# Patient Record
Sex: MALE | Race: ASIAN | NOT HISPANIC OR LATINO | URBAN - METROPOLITAN AREA
[De-identification: names, ages, dates, MRNs, and addresses within clinical notes are randomized per-mention and may not be internally consistent; named-entity substitution may affect disease eponyms.]

---

## 2019-08-28 ENCOUNTER — INPATIENT (INPATIENT)
Facility: HOSPITAL | Age: 69
LOS: 8 days | Discharge: HOME HEALTH | End: 2019-09-06
Attending: HOSPITALIST | Admitting: HOSPITALIST
Payer: MEDICAID

## 2019-08-28 VITALS
SYSTOLIC BLOOD PRESSURE: 130 MMHG | DIASTOLIC BLOOD PRESSURE: 81 MMHG | RESPIRATION RATE: 16 BRPM | TEMPERATURE: 98 F | OXYGEN SATURATION: 99 % | HEART RATE: 87 BPM

## 2019-08-28 DIAGNOSIS — Z29.9 ENCOUNTER FOR PROPHYLACTIC MEASURES, UNSPECIFIED: ICD-10-CM

## 2019-08-28 DIAGNOSIS — R74.0 NONSPECIFIC ELEVATION OF LEVELS OF TRANSAMINASE AND LACTIC ACID DEHYDROGENASE [LDH]: ICD-10-CM

## 2019-08-28 DIAGNOSIS — G93.89 OTHER SPECIFIED DISORDERS OF BRAIN: ICD-10-CM

## 2019-08-28 DIAGNOSIS — R91.8 OTHER NONSPECIFIC ABNORMAL FINDING OF LUNG FIELD: ICD-10-CM

## 2019-08-28 LAB
ALBUMIN SERPL ELPH-MCNC: 4.1 G/DL — SIGNIFICANT CHANGE UP (ref 3.3–5)
ALBUMIN SERPL ELPH-MCNC: 4.3 G/DL — SIGNIFICANT CHANGE UP (ref 3.3–5)
ALP SERPL-CCNC: 126 U/L — HIGH (ref 40–120)
ALP SERPL-CCNC: 129 U/L — HIGH (ref 40–120)
ALT FLD-CCNC: 219 U/L — HIGH (ref 4–41)
ALT FLD-CCNC: 256 U/L — HIGH (ref 4–41)
ANION GAP SERPL CALC-SCNC: 13 MMO/L — SIGNIFICANT CHANGE UP (ref 7–14)
ANION GAP SERPL CALC-SCNC: 15 MMO/L — HIGH (ref 7–14)
APTT BLD: 26.1 SEC — LOW (ref 27.5–36.3)
AST SERPL-CCNC: 48 U/L — HIGH (ref 4–40)
AST SERPL-CCNC: 80 U/L — HIGH (ref 4–40)
BASOPHILS # BLD AUTO: 0.1 K/UL — SIGNIFICANT CHANGE UP (ref 0–0.2)
BASOPHILS # BLD AUTO: 0.16 K/UL — SIGNIFICANT CHANGE UP (ref 0–0.2)
BASOPHILS NFR BLD AUTO: 0.5 % — SIGNIFICANT CHANGE UP (ref 0–2)
BASOPHILS NFR BLD AUTO: 0.7 % — SIGNIFICANT CHANGE UP (ref 0–2)
BASOPHILS NFR SPEC: 0.8 % — SIGNIFICANT CHANGE UP (ref 0–2)
BILIRUB SERPL-MCNC: 0.2 MG/DL — SIGNIFICANT CHANGE UP (ref 0.2–1.2)
BILIRUB SERPL-MCNC: 0.3 MG/DL — SIGNIFICANT CHANGE UP (ref 0.2–1.2)
BLASTS # FLD: 0 % — SIGNIFICANT CHANGE UP (ref 0–0)
BLD GP AB SCN SERPL QL: NEGATIVE — SIGNIFICANT CHANGE UP
BUN SERPL-MCNC: 23 MG/DL — SIGNIFICANT CHANGE UP (ref 7–23)
BUN SERPL-MCNC: 26 MG/DL — HIGH (ref 7–23)
CALCIUM SERPL-MCNC: 9.2 MG/DL — SIGNIFICANT CHANGE UP (ref 8.4–10.5)
CALCIUM SERPL-MCNC: 9.2 MG/DL — SIGNIFICANT CHANGE UP (ref 8.4–10.5)
CHLORIDE SERPL-SCNC: 98 MMOL/L — SIGNIFICANT CHANGE UP (ref 98–107)
CHLORIDE SERPL-SCNC: 99 MMOL/L — SIGNIFICANT CHANGE UP (ref 98–107)
CO2 SERPL-SCNC: 21 MMOL/L — LOW (ref 22–31)
CO2 SERPL-SCNC: 23 MMOL/L — SIGNIFICANT CHANGE UP (ref 22–31)
CREAT SERPL-MCNC: 0.8 MG/DL — SIGNIFICANT CHANGE UP (ref 0.5–1.3)
CREAT SERPL-MCNC: 0.88 MG/DL — SIGNIFICANT CHANGE UP (ref 0.5–1.3)
EOSINOPHIL # BLD AUTO: 0.01 K/UL — SIGNIFICANT CHANGE UP (ref 0–0.5)
EOSINOPHIL # BLD AUTO: 0.02 K/UL — SIGNIFICANT CHANGE UP (ref 0–0.5)
EOSINOPHIL NFR BLD AUTO: 0 % — SIGNIFICANT CHANGE UP (ref 0–6)
EOSINOPHIL NFR BLD AUTO: 0.1 % — SIGNIFICANT CHANGE UP (ref 0–6)
EOSINOPHIL NFR FLD: 0 % — SIGNIFICANT CHANGE UP (ref 0–6)
GIANT PLATELETS BLD QL SMEAR: PRESENT — SIGNIFICANT CHANGE UP
GLUCOSE SERPL-MCNC: 114 MG/DL — HIGH (ref 70–99)
GLUCOSE SERPL-MCNC: 134 MG/DL — HIGH (ref 70–99)
HCT VFR BLD CALC: 39.4 % — SIGNIFICANT CHANGE UP (ref 39–50)
HCT VFR BLD CALC: 39.6 % — SIGNIFICANT CHANGE UP (ref 39–50)
HGB BLD-MCNC: 13 G/DL — SIGNIFICANT CHANGE UP (ref 13–17)
HGB BLD-MCNC: 13.3 G/DL — SIGNIFICANT CHANGE UP (ref 13–17)
IMM GRANULOCYTES NFR BLD AUTO: 6.1 % — HIGH (ref 0–1.5)
IMM GRANULOCYTES NFR BLD AUTO: 6.5 % — HIGH (ref 0–1.5)
INR BLD: 0.91 — SIGNIFICANT CHANGE UP (ref 0.88–1.17)
LYMPHOCYTES # BLD AUTO: 1.7 K/UL — SIGNIFICANT CHANGE UP (ref 1–3.3)
LYMPHOCYTES # BLD AUTO: 1.87 K/UL — SIGNIFICANT CHANGE UP (ref 1–3.3)
LYMPHOCYTES # BLD AUTO: 7.7 % — LOW (ref 13–44)
LYMPHOCYTES # BLD AUTO: 9.5 % — LOW (ref 13–44)
LYMPHOCYTES NFR SPEC AUTO: 6.8 % — LOW (ref 13–44)
MAGNESIUM SERPL-MCNC: 2.2 MG/DL — SIGNIFICANT CHANGE UP (ref 1.6–2.6)
MCHC RBC-ENTMCNC: 29 PG — SIGNIFICANT CHANGE UP (ref 27–34)
MCHC RBC-ENTMCNC: 29.1 PG — SIGNIFICANT CHANGE UP (ref 27–34)
MCHC RBC-ENTMCNC: 33 % — SIGNIFICANT CHANGE UP (ref 32–36)
MCHC RBC-ENTMCNC: 33.6 % — SIGNIFICANT CHANGE UP (ref 32–36)
MCV RBC AUTO: 86.7 FL — SIGNIFICANT CHANGE UP (ref 80–100)
MCV RBC AUTO: 87.9 FL — SIGNIFICANT CHANGE UP (ref 80–100)
METAMYELOCYTES # FLD: 0.9 % — SIGNIFICANT CHANGE UP (ref 0–1)
MONOCYTES # BLD AUTO: 1.53 K/UL — HIGH (ref 0–0.9)
MONOCYTES # BLD AUTO: 1.55 K/UL — HIGH (ref 0–0.9)
MONOCYTES NFR BLD AUTO: 7.1 % — SIGNIFICANT CHANGE UP (ref 2–14)
MONOCYTES NFR BLD AUTO: 7.8 % — SIGNIFICANT CHANGE UP (ref 2–14)
MONOCYTES NFR BLD: 7.7 % — SIGNIFICANT CHANGE UP (ref 2–9)
MYELOCYTES NFR BLD: 0.9 % — HIGH (ref 0–0)
NEUTROPHIL AB SER-ACNC: 81.2 % — HIGH (ref 43–77)
NEUTROPHILS # BLD AUTO: 14.87 K/UL — HIGH (ref 1.8–7.4)
NEUTROPHILS # BLD AUTO: 17.12 K/UL — HIGH (ref 1.8–7.4)
NEUTROPHILS NFR BLD AUTO: 76 % — SIGNIFICANT CHANGE UP (ref 43–77)
NEUTROPHILS NFR BLD AUTO: 78 % — HIGH (ref 43–77)
NEUTS BAND # BLD: 0.8 % — SIGNIFICANT CHANGE UP (ref 0–6)
NRBC # FLD: 0 K/UL — SIGNIFICANT CHANGE UP (ref 0–0)
NRBC # FLD: 0 K/UL — SIGNIFICANT CHANGE UP (ref 0–0)
OTHER - HEMATOLOGY %: 0 — SIGNIFICANT CHANGE UP
PHOSPHATE SERPL-MCNC: 3.4 MG/DL — SIGNIFICANT CHANGE UP (ref 2.5–4.5)
PLATELET # BLD AUTO: 287 K/UL — SIGNIFICANT CHANGE UP (ref 150–400)
PLATELET # BLD AUTO: 311 K/UL — SIGNIFICANT CHANGE UP (ref 150–400)
PLATELET COUNT - ESTIMATE: NORMAL — SIGNIFICANT CHANGE UP
PMV BLD: 10.6 FL — SIGNIFICANT CHANGE UP (ref 7–13)
PMV BLD: 10.8 FL — SIGNIFICANT CHANGE UP (ref 7–13)
POTASSIUM SERPL-MCNC: 4 MMOL/L — SIGNIFICANT CHANGE UP (ref 3.5–5.3)
POTASSIUM SERPL-MCNC: 4.6 MMOL/L — SIGNIFICANT CHANGE UP (ref 3.5–5.3)
POTASSIUM SERPL-SCNC: 4 MMOL/L — SIGNIFICANT CHANGE UP (ref 3.5–5.3)
POTASSIUM SERPL-SCNC: 4.6 MMOL/L — SIGNIFICANT CHANGE UP (ref 3.5–5.3)
PROMYELOCYTES # FLD: 0 % — SIGNIFICANT CHANGE UP (ref 0–0)
PROT SERPL-MCNC: 6.7 G/DL — SIGNIFICANT CHANGE UP (ref 6–8.3)
PROT SERPL-MCNC: 7 G/DL — SIGNIFICANT CHANGE UP (ref 6–8.3)
PROTHROM AB SERPL-ACNC: 10.4 SEC — SIGNIFICANT CHANGE UP (ref 9.8–13.1)
RBC # BLD: 4.48 M/UL — SIGNIFICANT CHANGE UP (ref 4.2–5.8)
RBC # BLD: 4.57 M/UL — SIGNIFICANT CHANGE UP (ref 4.2–5.8)
RBC # FLD: 13.4 % — SIGNIFICANT CHANGE UP (ref 10.3–14.5)
RBC # FLD: 13.4 % — SIGNIFICANT CHANGE UP (ref 10.3–14.5)
RH IG SCN BLD-IMP: POSITIVE — SIGNIFICANT CHANGE UP
SODIUM SERPL-SCNC: 134 MMOL/L — LOW (ref 135–145)
SODIUM SERPL-SCNC: 135 MMOL/L — SIGNIFICANT CHANGE UP (ref 135–145)
VARIANT LYMPHS # BLD: 0.9 % — SIGNIFICANT CHANGE UP
WBC # BLD: 19.59 K/UL — HIGH (ref 3.8–10.5)
WBC # BLD: 21.97 K/UL — HIGH (ref 3.8–10.5)
WBC # FLD AUTO: 19.59 K/UL — HIGH (ref 3.8–10.5)
WBC # FLD AUTO: 21.97 K/UL — HIGH (ref 3.8–10.5)

## 2019-08-28 PROCEDURE — 99223 1ST HOSP IP/OBS HIGH 75: CPT

## 2019-08-28 PROCEDURE — 70450 CT HEAD/BRAIN W/O DYE: CPT | Mod: 26

## 2019-08-28 PROCEDURE — 99233 SBSQ HOSP IP/OBS HIGH 50: CPT | Mod: GC

## 2019-08-28 PROCEDURE — 71260 CT THORAX DX C+: CPT | Mod: 26

## 2019-08-28 PROCEDURE — 70553 MRI BRAIN STEM W/O & W/DYE: CPT | Mod: 26

## 2019-08-28 PROCEDURE — 74177 CT ABD & PELVIS W/CONTRAST: CPT | Mod: 26

## 2019-08-28 PROCEDURE — 99223 1ST HOSP IP/OBS HIGH 75: CPT | Mod: GC

## 2019-08-28 RX ORDER — IBUPROFEN 200 MG
600 TABLET ORAL THREE TIMES A DAY
Refills: 0 | Status: DISCONTINUED | OUTPATIENT
Start: 2019-08-28 | End: 2019-09-03

## 2019-08-28 RX ORDER — ACETAMINOPHEN 500 MG
650 TABLET ORAL EVERY 4 HOURS
Refills: 0 | Status: DISCONTINUED | OUTPATIENT
Start: 2019-08-28 | End: 2019-08-28

## 2019-08-28 RX ORDER — IBUPROFEN 200 MG
200 TABLET ORAL EVERY 6 HOURS
Refills: 0 | Status: DISCONTINUED | OUTPATIENT
Start: 2019-08-28 | End: 2019-09-03

## 2019-08-28 RX ORDER — LEVETIRACETAM 250 MG/1
500 TABLET, FILM COATED ORAL
Refills: 0 | Status: DISCONTINUED | OUTPATIENT
Start: 2019-08-28 | End: 2019-09-01

## 2019-08-28 RX ORDER — ONDANSETRON 8 MG/1
4 TABLET, FILM COATED ORAL EVERY 6 HOURS
Refills: 0 | Status: DISCONTINUED | OUTPATIENT
Start: 2019-08-28 | End: 2019-09-06

## 2019-08-28 RX ORDER — DEXAMETHASONE 0.5 MG/5ML
1.5 ELIXIR ORAL THREE TIMES A DAY
Refills: 0 | Status: DISCONTINUED | OUTPATIENT
Start: 2019-08-28 | End: 2019-09-01

## 2019-08-28 RX ORDER — DOCUSATE SODIUM 100 MG
100 CAPSULE ORAL THREE TIMES A DAY
Refills: 0 | Status: DISCONTINUED | OUTPATIENT
Start: 2019-08-28 | End: 2019-09-06

## 2019-08-28 RX ORDER — ACETAMINOPHEN 500 MG
1000 TABLET ORAL EVERY 6 HOURS
Refills: 0 | Status: DISCONTINUED | OUTPATIENT
Start: 2019-08-28 | End: 2019-08-28

## 2019-08-28 RX ADMIN — Medication 1.5 MILLIGRAM(S): at 22:43

## 2019-08-28 RX ADMIN — LEVETIRACETAM 500 MILLIGRAM(S): 250 TABLET, FILM COATED ORAL at 22:43

## 2019-08-28 RX ADMIN — Medication 600 MILLIGRAM(S): at 23:40

## 2019-08-28 RX ADMIN — Medication 600 MILLIGRAM(S): at 22:45

## 2019-08-28 NOTE — H&P ADULT - ASSESSMENT
69 y/o M with no known PMH presenting with a 1 month hx of vertigo and ataxia s/p fall a/f possible metastatic disease demonstrated on CT head non-con 67 y/o M with no known PMH presenting with a 1 month hx of vertigo and ataxia s/p witnessed fall a/f possible metastatic disease demonstrated on CT head non-con 69 y/o M with no known PMH presenting with a 1 month hx of vertigo and ataxia s/p witnessed fall a/f presumed metastatic disease of brain and lungs.

## 2019-08-28 NOTE — H&P ADULT - NSHPSOCIALHISTORY_GEN_ALL_CORE
retired  52 pack year tobacco smoking history, quit 20 years ago  denied alcohol use  denied illicit drugs retired, travels often between [facility] and the   52 pack year tobacco smoking history, quit 20 years ago  denied alcohol use  denied illicit drugs

## 2019-08-28 NOTE — H&P ADULT - NSICDXPASTMEDICALHX_GEN_ALL_CORE_FT
PAST MEDICAL HISTORY:  No pertinent past medical history patient does not formally see doctors, just speaks to his friends

## 2019-08-28 NOTE — ED PROVIDER NOTE - OBJECTIVE STATEMENT
68yom with no known medical history has been having vertigo and unsteady gait for the past 45 days. Gradual onset of symptoms with no precipitating event, constant since onset. Vertigo is exacerbated by head movements. Seen initially in [facility] and was diagnosed with brain masses. [place].

## 2019-08-28 NOTE — ED PROVIDER NOTE - CLINICAL SUMMARY MEDICAL DECISION MAKING FREE TEXT BOX
known brain mets, ataxic and vertiginous but otherwise neuro intact. pan scan revealed known brain lesions as well as lodules in the lung. Evaluated by neurosurgery, recommend MRI and metastatic work up, at this time no role for steroids or surgical intervention. Noted leukocytosis, pt is afebrile with no infectious symptoms, no role for antibiotics at this time.

## 2019-08-28 NOTE — CONSULT NOTE ADULT - SUBJECTIVE AND OBJECTIVE BOX
NEUROSURGERY CONSULT  MD CARABALLO   08-28-19 @ 13:57    HPI: 68y Male with no significant PMHx here from [facility] with 45 days of vertigo and unsteady gait. In [facility], was found to have multiple brain mets. Patient does not endorse pain, nausea, vomiting, or headache. No changes in bowel or bladder. Unsteady gait.    RADIOLOGY:   < from: CT Head No Cont (08.28.19 @ 12:01) >    Multiple  lesions concerning for metastasis with hemorrhagic and/or   proteinaceous debris as detailed above, including left parietal occipital   mass with fluid fluid level with  edema and mass effect, MRI with   gadolinium will better assess. Basal cisterns are patent, no extra axial   collection or midline shift. Strongly suggest improved assessment using   MRI with gadolinium.    < from: CT Abdomen and Pelvis w/ IV Cont (08.28.19 @ 12:01) >    Emphysema with bilateral solid pulmonary nodules measuring 2.3 cm in the   right upper lobe and 0.7 cm in the left upper lobe. Additional   groundglass and mixed solid and ground glass nodular opacities   bilaterally. Pulmonary nodules are indeterminate, but may be seen in the   setting of a primary lung malignancy. No intrathoracic lymphadenopathy.    No solid organ lesion or lymphadenopathy in the abdomen or pelvis.    PHYSICAL EXAM:  Vital Signs Last 24 Hrs  T(C): 36.4 (28 Aug 2019 13:00), Max: 36.5 (28 Aug 2019 09:07)  T(F): 97.6 (28 Aug 2019 13:00), Max: 97.7 (28 Aug 2019 09:07)  HR: 80 (28 Aug 2019 13:00) (80 - 87)  BP: 127/63 (28 Aug 2019 13:00) (127/63 - 130/81)  RR: 16 (28 Aug 2019 13:00) (16 - 16)  SpO2: 98% (28 Aug 2019 13:00) (98% - 99%)    AAOx3  EOMI, PERRL  RUIZ x4 with good strength   sensation intact  no drift     LABS:                        13.3   21.97 )-----------( 311      ( 28 Aug 2019 10:20 )             39.6     08-28    135  |  99  |  26<H>  ----------------------------<  134<H>  4.0   |  21<L>  |  0.88    Ca    9.2      28 Aug 2019 10:20  TPro  7.0  /  Alb  4.3  /  TBili  0.3  /  DBili  x   /  AST  80<H>  /  ALT  256<H>  /  AlkPhos  129<H>  08-28

## 2019-08-28 NOTE — H&P ADULT - NSHPLABSRESULTS_GEN_ALL_CORE
CBC Full  -  ( 28 Aug 2019 10:20 )  WBC Count : 21.97 K/uL  RBC Count : 4.57 M/uL  Hemoglobin : 13.3 g/dL  Hematocrit : 39.6 %  Platelet Count - Automated : 311 K/uL  Mean Cell Volume : 86.7 fL  Mean Cell Hemoglobin : 29.1 pg  Mean Cell Hemoglobin Concentration : 33.6 %  Auto Neutrophil # : 17.12 K/uL  Auto Lymphocyte # : 1.70 K/uL  Auto Monocyte # : 1.55 K/uL  Auto Eosinophil # : 0.01 K/uL  Auto Basophil # : 0.16 K/uL  Auto Neutrophil % : 78.0 %  Auto Lymphocyte % : 7.7 %  Auto Monocyte % : 7.1 %  Auto Eosinophil % : 0.0 %  Auto Basophil % : 0.7 %      08-28    135  |  99  |  26<H>  ----------------------------<  134<H>  4.0   |  21<L>  |  0.88    Ca    9.2      28 Aug 2019 10:20    TPro  7.0  /  Alb  4.3  /  TBili  0.3  /  DBili  x   /  AST  80<H>  /  ALT  256<H>  /  AlkPhos  129<H>  08-28    < from: CT Head No Cont (08.28.19 @ 12:01) >    PROCEDURE DATE:  Aug 28 2019         INTERPRETATION:  CLINICAL INDICATION: Brain metastases from outside   imaging,  evaluate malignancy, blurred vision    Technique: Noncontrast CT of the head was performed.    Multiple contiguous axial images were acquired from the skull base to the   vertex without the administration of intravenous contrast. Coronal and   sagittal reformations were made.    COMPARISON: None.    FINDINGS:    There are multiple supra and infratentorial lesions several of which   demonstrate increased rim attenuation, with central decreased attenuation   concerning for metastasis including and not limited to: a high midline   cerebellar vermis lesion measuring 1.8 x 2.8 x 1.8 cm, AP, TR, CC, and   inferior heterogeneous mass effacing the fourth ventricle measuring 2.5 x   2.5 x 2.1 cm, AP, TR, CC, multiple additional lesions noted in the   cerebral hemispheres hemisphere, including one with increased attenuation   in the right frontal lobe measuring 1.3 x 1.0 x 0.92 cm, AP, TR, CC and   left parietal occipital lesion with increased attenuation and fluid fluid   level measuring 1.4 x 1.5 x 1.8 cm, AP, TR, CC, edema and mass effect   with effacement of the sulci. Multiple additional smaller lesions are   noted. MR with gadolinium is more sensitive for full assessment of   intracranial metastatic disease.    There is mild enlargement the ventricles and sulci consistent with volume   loss, there is enlargement and decreased attenuation along the forniceal   columns, (2:14). There is nodular white matter decreased attenuation,   likely microvascular disease, other etiologies not excluded. The   cavernous sinus vasculature is prominent greater than (400:24). There is   no large extra-axial collection, or significant midline shift.    Orbits demonstrate a rightward gaze preference. Paranasal sinuses and   mastoid air cells appear free of disease. The calvarium is intact.    IMPRESSION:    Multiple  lesions concerning for metastasis with hemorrhagic and/or   proteinaceous debris as detailed above, including left parietal occipital   mass with fluid fluid level with  edema and mass effect, MRI with   gadolinium will better assess. Basal cisterns are patent, no extra axial   collection or midline shift. Strongly suggest improved assessment using   MRI with gadolinium.    < end of copied text >    CT Abdomen/Pelvis:  LIVER: Normal morphology. A subcentimeter hypodensity in the right lobe   is too small to characterize, but indeterminate in the setting of   possible malignancy.    IMPRESSION:   Emphysema with bilateral solid pulmonary nodules measuring 2.3 cm in the right upper lobe and 0.7 cm in the left upper lobe. Additional groundglass and mixed solid and ground glass nodular opacities bilaterally. Pulmonary nodules are indeterminate, but may be seen in the setting of a primary lung malignancy. No intrathoracic lymphadenopathy.    No solid organ lesion or lymphadenopathy in the abdomen or pelvis. CBC Full  -  ( 28 Aug 2019 10:20 )  WBC Count : 21.97 K/uL  RBC Count : 4.57 M/uL  Hemoglobin : 13.3 g/dL  Hematocrit : 39.6 %  Platelet Count - Automated : 311 K/uL  Mean Cell Volume : 86.7 fL  Mean Cell Hemoglobin : 29.1 pg  Mean Cell Hemoglobin Concentration : 33.6 %  Auto Neutrophil # : 17.12 K/uL  Auto Lymphocyte # : 1.70 K/uL  Auto Monocyte # : 1.55 K/uL  Auto Eosinophil # : 0.01 K/uL  Auto Basophil # : 0.16 K/uL  Auto Neutrophil % : 78.0 %  Auto Lymphocyte % : 7.7 %  Auto Monocyte % : 7.1 %  Auto Eosinophil % : 0.0 %  Auto Basophil % : 0.7 %    08-28    135  |  99  |  26<H>  ----------------------------<  134<H>  4.0   |  21<L>  |  0.88    Ca    9.2      28 Aug 2019 10:20    TPro  7.0  /  Alb  4.3  /  TBili  0.3  /  DBili  x   /  AST  80<H>  /  ALT  256<H>  /  AlkPhos  129<H>  08-28    < from: CT Head No Cont (08.28.19 @ 12:01) >    PROCEDURE DATE:  Aug 28 2019         INTERPRETATION:  CLINICAL INDICATION: Brain metastases from outside   imaging,  evaluate malignancy, blurred vision    Technique: Noncontrast CT of the head was performed.    Multiple contiguous axial images were acquired from the skull base to the   vertex without the administration of intravenous contrast. Coronal and   sagittal reformations were made.    COMPARISON: None.    FINDINGS:    There are multiple supra and infratentorial lesions several of which   demonstrate increased rim attenuation, with central decreased attenuation   concerning for metastasis including and not limited to: a high midline   cerebellar vermis lesion measuring 1.8 x 2.8 x 1.8 cm, AP, TR, CC, and   inferior heterogeneous mass effacing the fourth ventricle measuring 2.5 x   2.5 x 2.1 cm, AP, TR, CC, multiple additional lesions noted in the   cerebral hemispheres hemisphere, including one with increased attenuation   in the right frontal lobe measuring 1.3 x 1.0 x 0.92 cm, AP, TR, CC and   left parietal occipital lesion with increased attenuation and fluid fluid   level measuring 1.4 x 1.5 x 1.8 cm, AP, TR, CC, edema and mass effect   with effacement of the sulci. Multiple additional smaller lesions are   noted. MR with gadolinium is more sensitive for full assessment of   intracranial metastatic disease.    There is mild enlargement the ventricles and sulci consistent with volume   loss, there is enlargement and decreased attenuation along the forniceal   columns, (2:14). There is nodular white matter decreased attenuation,   likely microvascular disease, other etiologies not excluded. The   cavernous sinus vasculature is prominent greater than (400:24). There is   no large extra-axial collection, or significant midline shift.    Orbits demonstrate a rightward gaze preference. Paranasal sinuses and   mastoid air cells appear free of disease. The calvarium is intact.    IMPRESSION:    Multiple  lesions concerning for metastasis with hemorrhagic and/or   proteinaceous debris as detailed above, including left parietal occipital   mass with fluid fluid level with  edema and mass effect, MRI with   gadolinium will better assess. Basal cisterns are patent, no extra axial   collection or midline shift. Strongly suggest improved assessment using   MRI with gadolinium.    < end of copied text >    CT Abdomen/Pelvis:  LIVER: Normal morphology. A subcentimeter hypodensity in the right lobe   is too small to characterize, but indeterminate in the setting of   possible malignancy.    IMPRESSION:   Emphysema with bilateral solid pulmonary nodules measuring 2.3 cm in the right upper lobe and 0.7 cm in the left upper lobe. Additional groundglass and mixed solid and ground glass nodular opacities bilaterally. Pulmonary nodules are indeterminate, but may be seen in the setting of a primary lung malignancy. No intrathoracic lymphadenopathy.    No solid organ lesion or lymphadenopathy in the abdomen or pelvis.

## 2019-08-28 NOTE — H&P ADULT - NSHPPHYSICALEXAM_GEN_ALL_CORE
GENERAL: NAD, lying in bed comfortably  HEAD:  Atraumatic, Normocephalic  EYES: EOMI, PERRLA, conjunctiva and sclera clear  ENT: Moist mucous membranes  NECK: Supple, No JVD  CHEST/LUNG: Clear to auscultation bilaterally; No rales, rhonchi, wheezing, or rubs. Unlabored respirations  HEART: Regular rate and rhythm; No murmurs, rubs, or gallops  ABDOMEN: Bowel sounds present; Soft, Nontender, Nondistended. No hepatomegally  EXTREMITIES:  2+ Peripheral Pulses, brisk capillary refill. No clubbing, cyanosis, or edema  MSK: FROM all 4 extremities, full and equal strength  SKIN: No rashes or lesions    NEURO:   Cranial Nerves 2-12 intact GENERAL: NAD, lying in bed comfortably  HEAD:  Atraumatic, Normocephalic  EYES: EOMI, PERRLA, conjunctiva and sclera clear  ENT: Moist mucous membranes  NECK: Supple, No JVD  CHEST/LUNG: Clear to auscultation bilaterally; No rales, rhonchi, wheezing, or rubs. Unlabored respirations  HEART: RRR, S1, S2, no M/R/G  ABDOMEN: Bowel sounds present; Soft, Nontender, Nondistended. No hepatomegally  EXTREMITIES:  2+ Peripheral Pulses, brisk capillary refill. No clubbing, cyanosis, or edema  MSK: FROM all 4 extremities, full and equal strength  SKIN: No rashes or lesions    NEURO:   Cranial Nerves 2-12 intact  Deltoid, bicep, tricep, wrist extensor, wrist flexor, intrinsic hand muscles strength bilat 5/5  Leg flexor, leg extensor, gastrocnemius, and tibialis strength 5/5 bilat  Slight resting tremor right hand  Sensation intact bilat  No pronator drift

## 2019-08-28 NOTE — H&P ADULT - PROBLEM SELECTOR PLAN 4
diet: regular; NPO after midnight for possible bronch tomorrow  VTE: SCDs in the setting of possible hemorrhage from brain lesion    Discussed with Dr. Mccrary

## 2019-08-28 NOTE — ED PROVIDER NOTE - ATTENDING CONTRIBUTION TO CARE
fior: hx from pt and cell phone pictures of mri reports from [facility].  PMh unremarkable.  45 day hx of 'vertigo' and unsteady gait. Hospitalized in [facility] for same and found to have multiple brain lesions c/w mets. [place] elected to [place] and pt arrived yesterday and came to [facility] today. There is a 3kg wt loss one month. No other new sx.   Exam: remarkable for slow and slightly unsteady gait.  Impression: Brain mets by [place]. fior: hx from pt and cell phone pictures of mri reports from [facility].  PMh unremarkable.  45 day hx of 'vertigo' and unsteady gait. Hospitalized in [facility] for same and found to have multiple brain lesions c/w mets. [place] elected to [place] and pt arrived yesterday and came to [facility] today. There is a 3kg wt loss one month. No other new sx.   Exam: remarkable for slow and slightly unsteady gait.  Impression: Brain mets by [place] MR report..

## 2019-08-28 NOTE — H&P ADULT - PROBLEM SELECTOR PLAN 3
possible metastatic dx in the setting of findings above in subcentimeter hypodense region seen on CT abdomen  - Abdominal U/S

## 2019-08-28 NOTE — H&P ADULT - HISTORY OF PRESENT ILLNESS
The patient is a 68y M with no known medical history and a 52 pack-year smoking hx quit 20 years ago presenting with 1-month history of acutely worsening ataxia.  He was seen in [facility] and was found to have masses in the brain and [place] the [place]. He reports having mild ataxia for approximately 1 year, before it acutely worsened approximately 1 month ago. He had a witnessed episode of syncope, without associated shaking or post-ictal state. Since then, he has severe ataxia whenever he stands up, requiring him to use a cane to walk. The ataxia is somewhat improved after sleeping, otherwise nothing has helped alleviate the symptoms, including Betahistine Dihydrochloride (for vertigo and Meniere’s disease) given in [facility]. He reports the severity of his ataxia has been constant for the past month.   About 2 weeks ago, the patient experienced a sharp, 10/10 headache with 1 episode of non-bloody vomiting. He described the headache as the worst of his life, with associated sensitivity to bright lights and loud sounds. He has had intermitted episodes of headache since then that have not been as severe. He has not vomited since then.  The patient also describes new blurriness in his peripheral vision bilaterally and 1 week of mild tremor. He reports subjective weight loss over the past month, estimated to be ~3kg, as noticed by a decreased arm circumference. No fevers or chills. No shortness of breath, chest pain, cough, hemoptysis, or fatigue. Apart from the single episode of vomiting with his headache, he has had no nausea, vomiting, diarrhea, or constipation. No hearing loss or difficulty chewing. There is some mild dysphagia that has been present for years The patient is a 68y M with no known medical history and a 52 pack-year smoking hx quit 20 years ago presenting with 1-month history of acutely worsening ataxia.  He was seen by a doctor in [facility], was found to have masses in the brain and [place] the [place]. He reports having mild ataxia for approximately 1 year, before it acutely worsened approximately 1 month ago. He had a witnessed episode of syncope, without associated shaking or post-ictal state. Since then, he has severe ataxia whenever he stands up, requiring him to use a cane to walk. The ataxia is somewhat improved after sleeping, otherwise nothing has helped alleviate the symptoms, including Betahistine Dihydrochloride (for vertigo and Meniere’s disease) given in [facility]. He reports the severity of his ataxia has been constant for the past month.   About 2 weeks ago, the patient experienced a sharp, 10/10 headache with 1 episode of non-bloody vomiting. He described the headache as the worst of his life, with associated sensitivity to bright lights and loud sounds. He has had intermitted episodes of headache since then that have not been as severe. He has not vomited since then.  The patient also describes new blurriness in his peripheral vision bilaterally and 1 week of mild tremor. He reports subjective weight loss over the past month, estimated to be ~3kg, as noticed by a decreased arm circumference. No fevers or chills. No shortness of breath, chest pain, cough, hemoptysis, or fatigue. Apart from the single episode of vomiting with his headache, he has had no nausea, vomiting, diarrhea, or constipation. No hearing loss or difficulty chewing. There is some mild dysphagia that has been present for years The patient is a 68y M with no reported medical history and a 52 pack-year smoking hx quit 20 years ago presenting with 1-month history of acutely worsening ataxia.  He was seen by a doctor in [facility], was found to have masses in the brain by imaging and [place] the [place]. He reports having mild ataxia for approximately 1 year, before it acutely worsened approximately 1 month ago. He had a witnessed episode of syncope, without associated shaking or post-ictal state. Since then, he has had severe ataxia whenever he stands up, requiring him to use a cane to walk. The ataxia is somewhat improved after sleeping, otherwise nothing has helped alleviate the symptoms, including Betahistine Dihydrochloride (for vertigo and Meniere’s disease) given in [facility]. He reports the severity of his ataxia has been constant for the past month.   About 2 weeks ago, the patient experienced a sharp, 10/10 headache with 1 episode of non-bloody vomiting. He described the headache as the worst of his life, with associated sensitivity to bright lights and loud sounds. He has had intermitted episodes of headache since then that have not been as severe. He has not vomited since then.  The patient also describes new blurriness in his peripheral vision bilaterally and 1 week of mild tremor. He reports subjective weight loss over the past month, estimated to be ~3kg, as noticed by a decreased arm circumference. No fevers or chills. No shortness of breath, chest pain, cough, hemoptysis, or fatigue. Apart from the single episode of vomiting with his headache, he has had no nausea, vomiting, diarrhea, or constipation. No hearing loss or difficulty chewing. There is some mild dysphagia that has been present for years.  In the ED, pt had T 97.7, HT 87, /81, RR 16, SpO2 99%RA.  He had leukocytosis to 19.59 (s/p steroids in [facility]) without bandemia, and transaminitis w/ AST 48, , and alk phos of 126.

## 2019-08-28 NOTE — H&P ADULT - ATTENDING COMMENTS
Patient personally seen and examined with primary team on 8/28/19 at 4:15 pm in the [facility] ED.  Family at bedside.  Plan as outlined.  Pulm and NeuroSx appreciated.  Will need tissue bx.

## 2019-08-28 NOTE — ED ADULT NURSE NOTE - NSIMPLEMENTINTERV_GEN_ALL_ED
Implemented All Fall Risk Interventions:  [place] to call system. Call bell, personal items and telephone within reach. Instruct patient to call for assistance. Room bathroom lighting operational. Non-slip footwear when patient is off stretcher. Physically safe environment: no spills, clutter or unnecessary equipment. Stretcher in lowest position, wheels locked, appropriate side rails in place. Provide visual cue, wrist band, yellow gown, etc. Monitor gait and stability. Monitor for mental status changes and reorient to person, place, and time. Review medications for side effects contributing to fall risk. Reinforce activity limits and safety measures with patient and family.

## 2019-08-28 NOTE — ED ADULT NURSE NOTE - CHIEF COMPLAINT QUOTE
Pt c/o dizziness and near syncope  x 1 mth , unsteady gait, neck stiffness, nausea, intermittent blurry vision, sometimes loss of vision, headache .  Pt was seen in the hospital in [facility] after fall with the same symptoms and was prescribed a pill

## 2019-08-28 NOTE — ED ADULT NURSE NOTE - OBJECTIVE STATEMENT
68M presents with dizziness, room spinning, nausea, near syncope x 1 month, recently placed on vertigo medications in [facility] with no relief. Pt states the medications have not helped. Dizziness exacerbated by moving the head side to side. Pt denies chest pain, SOB, fever, chills.

## 2019-08-28 NOTE — ED ADULT TRIAGE NOTE - CHIEF COMPLAINT QUOTE
Pt c/o dizziness and near syncope  x 1 mth , unsteady gait, neck stiffness, nausea, intermittent blurry vision, sometimes loss of vision, headache .  Pt was seen in the hospital in [facility] after fall with the same symptoms and was prescribed a pill

## 2019-08-28 NOTE — H&P ADULT - PROBLEM SELECTOR PLAN 2
primary lung CA in pt with 50 pack year smoking hx vs. metastatic dx from unknown primary  - pulmonology c/s; plan for bronchoscopy w/ bx tomorrow  - NPO after midnight

## 2019-08-28 NOTE — H&P ADULT - NSHPREVIEWOFSYSTEMS_GEN_ALL_CORE
REVIEW OF SYSTEMS:  CONSTITUTIONAL: No fever, chills, night sweats, or fatigue  EYES: diminished peripheral vision; sensitivity to light; no pain or blurry vision  ENMT:  vertigo, sensitivity to sound; no tinnitus; no sinus or throat pain  NECK: No pain or stiffness  RESPIRATORY: No cough, wheezing, or hemoptysis; No shortness of breath  CARDIOVASCULAR: No chest pain, palpitations, dizziness, or leg swelling  GASTROINTESTINAL: One episode of NBNB vomiting with headache; No abdominal or epigastric pain. No hematemesis; No diarrhea or constipation. No melena or hematochezia.  GENITOURINARY: No dysuria, frequency, hematuria, or incontinence  NEUROLOGICAL: Sharp headaches; no memory loss, loss of strength, numbness, or tremors  SKIN: No itching, burning, rashes, or lesions   LYMPH NODES: No enlarged glands  ENDOCRINE: No heat or cold intolerance; No hair loss  MUSCULOSKELETAL: No joint pain or swelling; No muscle, back, or extremity pain  HEME/LYMPH: No easy bruising, or bleeding gums  ALLERGY AND IMMUNOLOGIC: No hives or eczema

## 2019-08-28 NOTE — H&P ADULT - PROBLEM SELECTOR PLAN 1
multiple lesions seen on CTH noncon concerning for metastasis disease from unknown primary vs. less likely primary neoplasm    disease vertigo, ataxia, and vision change likely 2/2 to multiple lesions seen on CTH noncon concerning for metastasis disease from unknown primary vs. less likely primary neoplasm  - disease vertigo, ataxia, and vision change likely 2/2 to brain lesion  - neurosurgery c/s, recs reviewed and appreciated  - f/u MR Brain  - c/w previous dose of dexamethasone 1.5 mg TID   - c/w levetiracetam 500 mg BID  - fall risk precautions multiple lesions seen on CTH noncon concerning for metastasis disease from unknown primary vs. less likely primary neoplasm  - disease vertigo, ataxia, and vision change likely 2/2 to brain lesion  - neurosurgery c/s, recs reviewed and appreciated  - f/u MR Brain  - c/w previous dose of dexamethasone 1.5 mg TID; leukocytosis w/ bands likely 2/2 to steroid use  - c/w levetiracetam 500 mg BID  - fall risk precautions

## 2019-08-28 NOTE — CONSULT NOTE ADULT - SUBJECTIVE AND OBJECTIVE BOX
CHIEF COMPLAINT:    HPI: Patient is a 67 yo M from [facility] with unclear PMH who presents with unsteady gait and vertigo symptoms for about 1.5 months. As per patient, he had head imaging in [facility] that "was confusing" so he came here for further work up. Patient unable to recount what medication he is on. In the ED, CT head showed multiple lesions concerning for metastasis with hemorrhagic and/or proteinaceous debris, including left parietal occipital mass with fluid level with edema and mass effect. CT C/A/P showed emphysema with bilateral solid pulmonary nodules measuring 2.3 cm in RUL and 0.7 cm REINALDO as well as additional groundglass and mixed solid and ground glass nodular opacities bilaterally; no intrathoracic lymphadenopathy and no solid organ lesion or lymphadenopathy in the abdomen or pelvis.      PAST MEDICAL & SURGICAL HISTORY:  No pertinent past medical history  No significant past surgical history      FAMILY HISTORY:      SOCIAL HISTORY:  Smoking: [ ] Never Smoked [ ] Former Smoker (__ packs x ___ years) [ ] Current Smoker  (1.5 packs x 35 years)  Substance Use: [X] Never Used [ ] Used ____  EtOH Use: None  Marital Status: [ ] Single [ ]  [ ]  [ ]   Sexual History:   Occupation: Former   Recent Travel:  Country of Birth:  Advance Directives:    Allergies    No Known Allergies    Intolerances        HOME MEDICATIONS:  Home Medications:      REVIEW OF SYSTEMS:  Constitutional: [ ] negative [ ] fevers [ ] chills [ ] weight loss [ ] weight gain  HEENT: [ ] negative [ ] dry eyes [ ] eye irritation [ ] postnasal drip [ ] nasal congestion  CV: [ ] negative  [ ] chest pain [ ] orthopnea [ ] palpitations [ ] murmur  Resp: [ ] negative [ ] cough [ ] shortness of breath [ ] dyspnea [ ] wheezing [ ] sputum [ ] hemoptysis  GI: [ ] negative [ ] nausea [ ] vomiting [ ] diarrhea [ ] constipation [ ] abd pain [ ] dysphagia   : [ ] negative [ ] dysuria [ ] nocturia [ ] hematuria [ ] increased urinary frequency  Musculoskeletal: [ ] negative [ ] back pain [ ] myalgias [ ] arthralgias [ ] fracture  Skin: [ ] negative [ ] rash [ ] itch  Neurological: [ ] negative [ ] headache [ ] dizziness [ ] syncope [ ] weakness [ ] numbness  Psychiatric: [ ] negative [ ] anxiety [ ] depression  Endocrine: [ ] negative [ ] diabetes [ ] thyroid problem  Hematologic/Lymphatic: [ ] negative [ ] anemia [ ] bleeding problem  Allergic/Immunologic: [ ] negative [ ] itchy eyes [ ] nasal discharge [ ] hives [ ] angioedema  [ ] All other systems negative  [ ] Unable to assess ROS because ________    OBJECTIVE:  ICU Vital Signs Last 24 Hrs  T(C): 36.4 (28 Aug 2019 16:49), Max: 36.9 (28 Aug 2019 15:35)  T(F): 97.5 (28 Aug 2019 16:49), Max: 98.4 (28 Aug 2019 15:35)  HR: 77 (28 Aug 2019 16:49) (77 - 87)  BP: 107/70 (28 Aug 2019 16:49) (107/70 - 130/81)  BP(mean): --  ABP: --  ABP(mean): --  RR: 18 (28 Aug 2019 16:49) (16 - 18)  SpO2: 96% (28 Aug 2019 16:49) (96% - 99%)        CAPILLARY BLOOD GLUCOSE          PHYSICAL EXAM:  General:   HEENT:   Lymph Nodes:  Neck:   Respiratory:   Cardiovascular:   Abdomen:   Extremities:   Skin:   Neurological:  Psychiatry:    LINES:     HOSPITAL MEDICATIONS:  Standing Meds:      PRN Meds:      LABS:                        13.3   21.97 )-----------( 311      ( 28 Aug 2019 10:20 )             39.6     Hgb Trend: 13.3<--  08-28    135  |  99  |  26<H>  ----------------------------<  134<H>  4.0   |  21<L>  |  0.88    Ca    9.2      28 Aug 2019 10:20    TPro  7.0  /  Alb  4.3  /  TBili  0.3  /  DBili  x   /  AST  80<H>  /  ALT  256<H>  /  AlkPhos  129<H>  08-28    Creatinine Trend: 0.88<--            MICROBIOLOGY:       RADIOLOGY:  [ ] Reviewed and interpreted by me    PULMONARY FUNCTION TESTS:    EKG: CHIEF COMPLAINT: unsteady gait    HPI: Patient is a 69 yo M from [facility] with unclear PMH who presents with unsteady gait and vertigo symptoms for about 1.5 months. As per patient, he had head imaging in [facility] that "was confusing" so he came here for further work up. Patient unable to recount what medication he is on. In the ED, CT head showed multiple lesions concerning for metastasis with hemorrhagic and/or proteinaceous debris, including left parietal occipital mass with fluid level with edema and mass effect. CT C/A/P showed emphysema with bilateral solid pulmonary nodules measuring 2.3 cm in RUL and 0.7 cm REINALDO as well as additional groundglass and mixed solid and ground glass nodular opacities bilaterally; no intrathoracic lymphadenopathy and no solid organ lesion or lymphadenopathy in the abdomen or pelvis.      PAST MEDICAL & SURGICAL HISTORY:  No pertinent past medical history  No significant past surgical history      FAMILY HISTORY:      SOCIAL HISTORY:  Smoking: [ ] Never Smoked [ ] Former Smoker (__ packs x ___ years) [x ] Current Smoker  (1.5 packs x 35 years)  Substance Use: [X] Never Used [ ] Used ____  EtOH Use: None  Marital Status: [ ] Single [ ]  [ ]  [ ]   Sexual History:   Occupation: Former   Recent Travel:  Country of Birth:  Advance Directives:    Allergies    No Known Allergies    Intolerances        HOME MEDICATIONS:  Home Medications:  patient is not sure of home medications    REVIEW OF SYSTEMS:  Constitutional: [ ] negative [ ] fevers [ ] chills [ ] weight loss [ ] weight gain  HEENT: [ ] negative [ ] dry eyes [ ] eye irritation [ ] postnasal drip [ ] nasal congestion  CV: [ ] negative  [ ] chest pain [ ] orthopnea [ ] palpitations [ ] murmur  Resp: [ ] negative [ ] cough [ ] shortness of breath [ ] dyspnea [ ] wheezing [ ] sputum [ ] hemoptysis  GI: [ ] negative [ ] nausea [ ] vomiting [ ] diarrhea [ ] constipation [ ] abd pain [ ] dysphagia   : [ ] negative [ ] dysuria [ ] nocturia [ ] hematuria [ ] increased urinary frequency  Musculoskeletal: [ ] negative [ ] back pain [ ] myalgias [ ] arthralgias [ ] fracture  Skin: [ ] negative [ ] rash [ ] itch  Neurological: [ ] negative [ ] headache [ ] dizziness [ ] syncope [ ] weakness [ ] numbness  Psychiatric: [ ] negative [ ] anxiety [ ] depression  Endocrine: [ ] negative [ ] diabetes [ ] thyroid problem  Hematologic/Lymphatic: [ ] negative [ ] anemia [ ] bleeding problem  Allergic/Immunologic: [ ] negative [ ] itchy eyes [ ] nasal discharge [ ] hives [ ] angioedema  [x ] All other systems negative  [ ] Unable to assess ROS because ________    OBJECTIVE:  ICU Vital Signs Last 24 Hrs  T(C): 36.4 (28 Aug 2019 16:49), Max: 36.9 (28 Aug 2019 15:35)  T(F): 97.5 (28 Aug 2019 16:49), Max: 98.4 (28 Aug 2019 15:35)  HR: 77 (28 Aug 2019 16:49) (77 - 87)  BP: 107/70 (28 Aug 2019 16:49) (107/70 - 130/81)  BP(mean): --  ABP: --  ABP(mean): --  RR: 18 (28 Aug 2019 16:49) (16 - 18)  SpO2: 96% (28 Aug 2019 16:49) (96% - 99%)        CAPILLARY BLOOD GLUCOSE          PHYSICAL EXAM:  General:   HEENT:   Lymph Nodes:  Neck:   Respiratory:   Cardiovascular:   Abdomen:   Extremities:   Skin:   Neurological:  Psychiatry:    LINES:     HOSPITAL MEDICATIONS:  Standing Meds:      PRN Meds:      LABS:                        13.3   21.97 )-----------( 311      ( 28 Aug 2019 10:20 )             39.6     Hgb Trend: 13.3<--  08-28    135  |  99  |  26<H>  ----------------------------<  134<H>  4.0   |  21<L>  |  0.88    Ca    9.2      28 Aug 2019 10:20    TPro  7.0  /  Alb  4.3  /  TBili  0.3  /  DBili  x   /  AST  80<H>  /  ALT  256<H>  /  AlkPhos  129<H>  08-28    Creatinine Trend: 0.88<--            MICROBIOLOGY:       RADIOLOGY:  [ ] Reviewed and interpreted by me    PULMONARY FUNCTION TESTS:    EKG: CHIEF COMPLAINT:    HPI: Patient is a 69 yo M from [facility] with unclear PMH who presents with unsteady gait and vertigo symptoms for about 1.5 months. As per patient, he had head imaging in [facility] that "was confusing" so he came here for further work up. Patient unable to recount what medication he is on. In the ED, CT head showed multiple lesions concerning for metastasis with hemorrhagic and/or proteinaceous debris, including left parietal occipital mass with fluid level with edema and mass effect. CT C/A/P showed emphysema with bilateral solid pulmonary nodules measuring 2.3 cm in RUL and 0.7 cm REINALDO as well as additional groundglass and mixed solid and ground glass nodular opacities bilaterally; no intrathoracic lymphadenopathy and no solid organ lesion or lymphadenopathy in the abdomen or pelvis.    Patient endorses some weight loss of 5 pounds recently. Endorses unlimited ET. Patient denies any fevers, chills, cough, SOB.     Pulmonary consulted for bronchoscopy for biopsy of lung mass.    PAST MEDICAL & SURGICAL HISTORY:  No pertinent past medical history  No significant past surgical history      FAMILY HISTORY:      SOCIAL HISTORY:  Smoking: [ ] Never Smoked [ ] Former Smoker (__ packs x ___ years) [ ] Current Smoker  (1.5 packs x 35 years)  Substance Use: [X] Never Used [ ] Used ____  EtOH Use: None  Marital Status: [ ] Single [ ]  [ ]  [ ]   Sexual History:   Occupation: Former   Recent Travel:  Country of Birth:  Advance Directives:    Allergies    No Known Allergies    Intolerances        HOME MEDICATIONS:  Home Medications:      REVIEW OF SYSTEMS:  Constitutional: [X] negative [ ] fevers [ ] chills [X] weight loss [ ] weight gain  HEENT: [ ] negative [ ] dry eyes [ ] eye irritation [ ] postnasal drip [ ] nasal congestion  CV: [ ] negative  [ ] chest pain [ ] orthopnea [ ] palpitations [ ] murmur  Resp: [X] negative [ ] cough [ ] shortness of breath [ ] dyspnea [ ] wheezing [ ] sputum [ ] hemoptysis  GI: [ ] negative [ ] nausea [ ] vomiting [ ] diarrhea [ ] constipation [ ] abd pain [ ] dysphagia   : [ ] negative [ ] dysuria [ ] nocturia [ ] hematuria [ ] increased urinary frequency  Musculoskeletal: [ ] negative [ ] back pain [ ] myalgias [ ] arthralgias [ ] fracture  Skin: [ ] negative [ ] rash [ ] itch  Neurological: [ ] negative [ ] headache [X] dizziness [ ] syncope [ ] weakness [ ] numbness  Psychiatric: [ ] negative [ ] anxiety [ ] depression  Endocrine: [ ] negative [ ] diabetes [ ] thyroid problem  Hematologic/Lymphatic: [ ] negative [ ] anemia [ ] bleeding problem  Allergic/Immunologic: [ ] negative [ ] itchy eyes [ ] nasal discharge [ ] hives [ ] angioedema  [X] All other systems negative  [ ] Unable to assess ROS because ________    OBJECTIVE:  ICU Vital Signs Last 24 Hrs  T(C): 36.4 (28 Aug 2019 16:49), Max: 36.9 (28 Aug 2019 15:35)  T(F): 97.5 (28 Aug 2019 16:49), Max: 98.4 (28 Aug 2019 15:35)  HR: 77 (28 Aug 2019 16:49) (77 - 87)  BP: 107/70 (28 Aug 2019 16:49) (107/70 - 130/81)  BP(mean): --  ABP: --  ABP(mean): --  RR: 18 (28 Aug 2019 16:49) (16 - 18)  SpO2: 96% (28 Aug 2019 16:49) (96% - 99%)        CAPILLARY BLOOD GLUCOSE          PHYSICAL EXAM:  General: NAD, resting comfortably  HEENT: EOMI, PERRLA  Respiratory: CTAB  Cardiovascular: S1S2, RRR, no murmurs  Abdomen: Soft, NTND, BS+  Extremities: No peripheral edema    LINES:     HOSPITAL MEDICATIONS:  Standing Meds:      PRN Meds:      LABS:                        13.3   21.97 )-----------( 311      ( 28 Aug 2019 10:20 )             39.6     Hgb Trend: 13.3<--  08-28    135  |  99  |  26<H>  ----------------------------<  134<H>  4.0   |  21<L>  |  0.88    Ca    9.2      28 Aug 2019 10:20    TPro  7.0  /  Alb  4.3  /  TBili  0.3  /  DBili  x   /  AST  80<H>  /  ALT  256<H>  /  AlkPhos  129<H>  08-28    Creatinine Trend: 0.88<--            MICROBIOLOGY:       RADIOLOGY:  [ ] Reviewed and interpreted by me    PULMONARY FUNCTION TESTS:    EKG:

## 2019-08-29 ENCOUNTER — RESULT REVIEW (OUTPATIENT)
Age: 69
End: 2019-08-29

## 2019-08-29 DIAGNOSIS — M79.605 PAIN IN LEFT LEG: ICD-10-CM

## 2019-08-29 DIAGNOSIS — R27.0 ATAXIA, UNSPECIFIED: ICD-10-CM

## 2019-08-29 LAB
ALBUMIN SERPL ELPH-MCNC: 4.1 G/DL — SIGNIFICANT CHANGE UP (ref 3.3–5)
ALP SERPL-CCNC: 122 U/L — HIGH (ref 40–120)
ALT FLD-CCNC: 207 U/L — HIGH (ref 4–41)
ANION GAP SERPL CALC-SCNC: 14 MMO/L — SIGNIFICANT CHANGE UP (ref 7–14)
AST SERPL-CCNC: 38 U/L — SIGNIFICANT CHANGE UP (ref 4–40)
BASOPHILS # BLD AUTO: 0.16 K/UL — SIGNIFICANT CHANGE UP (ref 0–0.2)
BASOPHILS NFR BLD AUTO: 0.8 % — SIGNIFICANT CHANGE UP (ref 0–2)
BILIRUB SERPL-MCNC: 0.3 MG/DL — SIGNIFICANT CHANGE UP (ref 0.2–1.2)
BLD GP AB SCN SERPL QL: NEGATIVE — SIGNIFICANT CHANGE UP
BUN SERPL-MCNC: 21 MG/DL — SIGNIFICANT CHANGE UP (ref 7–23)
CALCIUM SERPL-MCNC: 9.2 MG/DL — SIGNIFICANT CHANGE UP (ref 8.4–10.5)
CHLORIDE SERPL-SCNC: 97 MMOL/L — LOW (ref 98–107)
CO2 SERPL-SCNC: 25 MMOL/L — SIGNIFICANT CHANGE UP (ref 22–31)
CREAT SERPL-MCNC: 0.81 MG/DL — SIGNIFICANT CHANGE UP (ref 0.5–1.3)
EOSINOPHIL # BLD AUTO: 0.04 K/UL — SIGNIFICANT CHANGE UP (ref 0–0.5)
EOSINOPHIL NFR BLD AUTO: 0.2 % — SIGNIFICANT CHANGE UP (ref 0–6)
GLUCOSE SERPL-MCNC: 106 MG/DL — HIGH (ref 70–99)
HCT VFR BLD CALC: 41.5 % — SIGNIFICANT CHANGE UP (ref 39–50)
HCV AB S/CO SERPL IA: 0.17 S/CO — SIGNIFICANT CHANGE UP (ref 0–0.99)
HCV AB SERPL-IMP: SIGNIFICANT CHANGE UP
HGB BLD-MCNC: 13.9 G/DL — SIGNIFICANT CHANGE UP (ref 13–17)
IMM GRANULOCYTES NFR BLD AUTO: 6.4 % — HIGH (ref 0–1.5)
LYMPHOCYTES # BLD AUTO: 1.88 K/UL — SIGNIFICANT CHANGE UP (ref 1–3.3)
LYMPHOCYTES # BLD AUTO: 9 % — LOW (ref 13–44)
MAGNESIUM SERPL-MCNC: 2.5 MG/DL — SIGNIFICANT CHANGE UP (ref 1.6–2.6)
MCHC RBC-ENTMCNC: 29.1 PG — SIGNIFICANT CHANGE UP (ref 27–34)
MCHC RBC-ENTMCNC: 33.5 % — SIGNIFICANT CHANGE UP (ref 32–36)
MCV RBC AUTO: 86.8 FL — SIGNIFICANT CHANGE UP (ref 80–100)
MONOCYTES # BLD AUTO: 1.74 K/UL — HIGH (ref 0–0.9)
MONOCYTES NFR BLD AUTO: 8.3 % — SIGNIFICANT CHANGE UP (ref 2–14)
NEUTROPHILS # BLD AUTO: 15.73 K/UL — HIGH (ref 1.8–7.4)
NEUTROPHILS NFR BLD AUTO: 75.3 % — SIGNIFICANT CHANGE UP (ref 43–77)
NRBC # FLD: 0 K/UL — SIGNIFICANT CHANGE UP (ref 0–0)
PHOSPHATE SERPL-MCNC: 3.5 MG/DL — SIGNIFICANT CHANGE UP (ref 2.5–4.5)
PLATELET # BLD AUTO: 292 K/UL — SIGNIFICANT CHANGE UP (ref 150–400)
PMV BLD: 10.9 FL — SIGNIFICANT CHANGE UP (ref 7–13)
POTASSIUM SERPL-MCNC: 4.5 MMOL/L — SIGNIFICANT CHANGE UP (ref 3.5–5.3)
POTASSIUM SERPL-SCNC: 4.5 MMOL/L — SIGNIFICANT CHANGE UP (ref 3.5–5.3)
PROT SERPL-MCNC: 6.9 G/DL — SIGNIFICANT CHANGE UP (ref 6–8.3)
RBC # BLD: 4.78 M/UL — SIGNIFICANT CHANGE UP (ref 4.2–5.8)
RBC # FLD: 13.3 % — SIGNIFICANT CHANGE UP (ref 10.3–14.5)
RH IG SCN BLD-IMP: POSITIVE — SIGNIFICANT CHANGE UP
SODIUM SERPL-SCNC: 136 MMOL/L — SIGNIFICANT CHANGE UP (ref 135–145)
WBC # BLD: 20.88 K/UL — HIGH (ref 3.8–10.5)
WBC # FLD AUTO: 20.88 K/UL — HIGH (ref 3.8–10.5)

## 2019-08-29 PROCEDURE — 88305 TISSUE EXAM BY PATHOLOGIST: CPT | Mod: 26

## 2019-08-29 PROCEDURE — 99232 SBSQ HOSP IP/OBS MODERATE 35: CPT | Mod: GC,25

## 2019-08-29 PROCEDURE — 99254 IP/OBS CNSLTJ NEW/EST MOD 60: CPT

## 2019-08-29 PROCEDURE — 31629 BRONCHOSCOPY/NEEDLE BX EACH: CPT | Mod: GC

## 2019-08-29 PROCEDURE — 71045 X-RAY EXAM CHEST 1 VIEW: CPT | Mod: 26

## 2019-08-29 PROCEDURE — 88341 IMHCHEM/IMCYTCHM EA ADD ANTB: CPT | Mod: 26,59

## 2019-08-29 PROCEDURE — 88360 TUMOR IMMUNOHISTOCHEM/MANUAL: CPT | Mod: 26

## 2019-08-29 PROCEDURE — 99233 SBSQ HOSP IP/OBS HIGH 50: CPT | Mod: GC

## 2019-08-29 PROCEDURE — 88173 CYTOPATH EVAL FNA REPORT: CPT | Mod: 26

## 2019-08-29 PROCEDURE — 31624 DX BRONCHOSCOPE/LAVAGE: CPT | Mod: GC

## 2019-08-29 PROCEDURE — 31625 BRONCHOSCOPY W/BIOPSY(S): CPT | Mod: 59,GC

## 2019-08-29 PROCEDURE — 88305 TISSUE EXAM BY PATHOLOGIST: CPT | Mod: 26,59

## 2019-08-29 PROCEDURE — 31627 NAVIGATIONAL BRONCHOSCOPY: CPT | Mod: GC

## 2019-08-29 PROCEDURE — 31654 BRONCH EBUS IVNTJ PERPH LES: CPT | Mod: GC

## 2019-08-29 PROCEDURE — 88342 IMHCHEM/IMCYTCHM 1ST ANTB: CPT | Mod: 26,59

## 2019-08-29 PROCEDURE — 76705 ECHO EXAM OF ABDOMEN: CPT | Mod: 26

## 2019-08-29 PROCEDURE — 88112 CYTOPATH CELL ENHANCE TECH: CPT | Mod: 26,59

## 2019-08-29 RX ORDER — TRAMADOL HYDROCHLORIDE 50 MG/1
25 TABLET ORAL DAILY
Refills: 0 | Status: DISCONTINUED | OUTPATIENT
Start: 2019-08-29 | End: 2019-08-29

## 2019-08-29 RX ORDER — ACETAMINOPHEN 500 MG
650 TABLET ORAL EVERY 6 HOURS
Refills: 0 | Status: DISCONTINUED | OUTPATIENT
Start: 2019-08-29 | End: 2019-09-06

## 2019-08-29 RX ORDER — ENOXAPARIN SODIUM 100 MG/ML
40 INJECTION SUBCUTANEOUS AT BEDTIME
Refills: 0 | Status: DISCONTINUED | OUTPATIENT
Start: 2019-08-29 | End: 2019-09-06

## 2019-08-29 RX ORDER — FENTANYL CITRATE 50 UG/ML
50 INJECTION INTRAVENOUS
Refills: 0 | Status: DISCONTINUED | OUTPATIENT
Start: 2019-08-29 | End: 2019-08-29

## 2019-08-29 RX ORDER — ONDANSETRON 8 MG/1
4 TABLET, FILM COATED ORAL ONCE
Refills: 0 | Status: DISCONTINUED | OUTPATIENT
Start: 2019-08-29 | End: 2019-08-29

## 2019-08-29 RX ORDER — QUETIAPINE FUMARATE 200 MG/1
25 TABLET, FILM COATED ORAL AT BEDTIME
Refills: 0 | Status: DISCONTINUED | OUTPATIENT
Start: 2019-08-29 | End: 2019-09-06

## 2019-08-29 RX ORDER — TRAMADOL HYDROCHLORIDE 50 MG/1
25 TABLET ORAL ONCE
Refills: 0 | Status: DISCONTINUED | OUTPATIENT
Start: 2019-08-29 | End: 2019-08-29

## 2019-08-29 RX ORDER — FENTANYL CITRATE 50 UG/ML
25 INJECTION INTRAVENOUS
Refills: 0 | Status: DISCONTINUED | OUTPATIENT
Start: 2019-08-29 | End: 2019-08-29

## 2019-08-29 RX ADMIN — QUETIAPINE FUMARATE 25 MILLIGRAM(S): 200 TABLET, FILM COATED ORAL at 21:34

## 2019-08-29 RX ADMIN — LEVETIRACETAM 500 MILLIGRAM(S): 250 TABLET, FILM COATED ORAL at 06:24

## 2019-08-29 RX ADMIN — Medication 1.5 MILLIGRAM(S): at 21:34

## 2019-08-29 RX ADMIN — Medication 600 MILLIGRAM(S): at 07:10

## 2019-08-29 RX ADMIN — Medication 100 MILLIGRAM(S): at 21:34

## 2019-08-29 RX ADMIN — Medication 1.5 MILLIGRAM(S): at 13:04

## 2019-08-29 RX ADMIN — Medication 1.5 MILLIGRAM(S): at 06:24

## 2019-08-29 RX ADMIN — TRAMADOL HYDROCHLORIDE 25 MILLIGRAM(S): 50 TABLET ORAL at 02:38

## 2019-08-29 RX ADMIN — LEVETIRACETAM 500 MILLIGRAM(S): 250 TABLET, FILM COATED ORAL at 20:04

## 2019-08-29 RX ADMIN — Medication 600 MILLIGRAM(S): at 06:27

## 2019-08-29 RX ADMIN — TRAMADOL HYDROCHLORIDE 25 MILLIGRAM(S): 50 TABLET ORAL at 03:30

## 2019-08-29 RX ADMIN — ENOXAPARIN SODIUM 40 MILLIGRAM(S): 100 INJECTION SUBCUTANEOUS at 21:34

## 2019-08-29 RX ADMIN — Medication 100 MILLIGRAM(S): at 13:04

## 2019-08-29 NOTE — PROGRESS NOTE ADULT - ASSESSMENT
Patient is a 67 yo M from [facility] with unclear PMH who presents with unsteady gait and vertigo symptoms for about 1.5 months found on CT imaging to have multiple brain lesions concerning for metastatic disease  and bilateral solid pulmonary nodules measuring 2.3 cm in RUL and 0.7 cm REINALDO as well as additional groundglass and mixed solid and ground glass nodular opacities bilaterally. Pulmonary consulted for bronchoscopy for biopsy of lung mass.

## 2019-08-29 NOTE — PROGRESS NOTE ADULT - PROBLEM SELECTOR PLAN 3
possible metastatic dx in the setting of findings above in subcentimeter hypodense region seen on CT abdomen  - s/p abdominal U/S primary lung CA in pt with 50 pack year smoking hx vs. metastatic dx from unknown primary  - pulmonology c/s; plan for bronchoscopy w/ bx today  - currently NPO; plan to resume regular diet as tolerated post procedure  - f/u bronch

## 2019-08-29 NOTE — CHART NOTE - NSCHARTNOTEFT_GEN_A_CORE
68year old male with multiple brain metastatsizes with associated edema and hemorrhage. Would recommend Lung Biopsy, and Rad onc consult. No acute neurosurgical intervention at this time. Case d/w Dr. Garcia.    < from: MR Head w/wo IV Cont (08.28.19 @ 20:10) >    FINDINGS:    VENTRICLES AND SULCI:  Prominent in size compatible with age-appropriate   volume loss.  INTRA-AXIAL:  Multiple enhancing masses are seen throughout the cerebral   and cerebellar hemispheres as seen on the prior CT scan. Several lesions   demonstrate susceptibility artifact with or without precontrast T1   hyperintensity and heterogeneous T2 signal compatible with associated   hemorrhage. Several lesions are associated with vasogenic edema.  A left occipital mass with significant edema measures 1.4 x 1.3 cm.   A mass within the vermis measures 2.3 x 1.4 cm.   A mass within the right cerebellar hemisphere measures 1.8 x 1.6 cm.   A mass within the left cerebellum involving the cerebellar tonsil   measures 1.8 x 1.8 cm and an adjacent mass within or projecting into the   fourth ventricle measures 1.5 x 1 cm effacing the fourth ventricular   lumen. This nodule significantly indents the adjacent medulla.    Scattered foci of white matter T2 hyperintensity are seen compatible with   mild to moderate microvascular type white matter changes.  EXTRA-AXIAL:  No mass or collection.  VISUALIZED SINUSES:  Normal.  VISUALIZED MASTOIDS:  Clear.  CALVARIUM:  Normal.  CAROTID FLOW VOIDS:  Present.  MISCELLANEOUS:  None.      IMPRESSION:    Multiple intraparenchymal enhancing masses several of which are   associated with hemorrhage and/or vasogenic edema. Metastatic disease is   a prime consideration.    2 adjacent lesions efface the fourth ventricular lumen however no   significant hydrocephalus is seen at this time.    < end of copied text >

## 2019-08-29 NOTE — PROGRESS NOTE ADULT - ATTENDING COMMENTS
Onc, pulm and neurosx appreciated.  For bronch/bx today by pulm.  Onc rec: rad onc consult.  F/u bx results.  Cont current meds as outlined.  Spoke with pt and daughter about care plan.  Home meds clarified.

## 2019-08-29 NOTE — PROGRESS NOTE ADULT - ATTENDING COMMENTS
Patient with ataxia, found to have multiple brain lesions and lung nodules on chest CT concerning for malignancy.  Plan for navigational bronchoscopy today.  Explained procedure to patient and family, risks/benefits, and they agree for procedure.

## 2019-08-29 NOTE — CHART NOTE - NSCHARTNOTEFT_GEN_A_CORE
Night Float Event Note  Covering Intern: Amari Kumar MD  Pager: [phone], DYV [phone]    Interval History:  Called by nurse because patient was having left leg pain. Nurse had previously given ibuprofen per pain regimen, but 2 hours later patient was indicating continued, severe (8/10) pain in leg above ankle. Patient seen and examined at bedside. He recounts 8/10 severity, describing pain as more aching in the muscle that worsens on palpation of the muscle. He states the pain starts near the ankle of his left leg and radiates along the muscle toward the knee. Patient seen at bedside; he notes that he had a fall 2 months ago in which he fractured his knee and had this type of pain after the event; had been on antibiotics and pain medication (but cannot remember which), and recently finished the antibiotics and stopped taking the pain medication just the other day. He states that he did not disclose this history to the primary team as he was not having leg pain during the day, but feels it is because of ending the pain medication he was taking previously. He has no pain when walking around (though does have trouble walking without a cane due to the vertigo that prompted his admission to the hospital), and does not have difficulty lifting or bending that leg on his own. He denies any other current complaints, including right leg pain, abdominal pain, chest pain, headache, SOB, palpitations, n/v, or weakness.    Vitals:  Vital Signs Last 24 Hrs  T(F): 97.7 (28 Aug 2019 19:12), Max: 98.4 (28 Aug 2019 15:35)  HR: 58 (28 Aug 2019 19:12) (58 - 87)  BP: 121/70 (28 Aug 2019 19:12) (107/70 - 130/81)  BP(mean): --  RR: 18 (28 Aug 2019 19:12) (16 - 18)  SpO2: 98% (28 Aug 2019 19:12) (96% - 99%)    Physical Exam:  GENERAL: NAD,   HEAD:  Atraumatic, Normocephalic  EYES: EOMI, conjunctiva and sclera clear  CHEST/LUNG: Clear to auscultation bilaterally; No rales, rhonchi, wheezing, or rubs  HEART: Regular rate and rhythm; No murmurs, rubs, or gallops  ABDOMEN: soft, nontender, nondistended. Bowel sounds normoactive  EXTREMITIES:  2+ Peripheral Pulses, No clubbing, cyanosis, or edema, left lower leg slight tender to palpation  NERVOUS SYSTEM:  A&O x3, non-focal neuro exam, full strength and ROM in both legs  SKIN: No rashes or lesions    Actions taken:  - likely musculoskeletal pain related to prior fall given pt's characterization of pain; low concern for additional issues at this point  -case discussed with MAR, agreed with giving tramadol for severe pain at this time  - nurse notified to contact me if pain does not improve with addition of tramadol

## 2019-08-29 NOTE — PRE-OP CHECKLIST - HEIGHT IN CM
----- Message from Mary Carmen Ash MD sent at 7/16/2018 12:03 PM CDT -----  Folate level is much better, though still with mild anemia and increase size of blood cells.  Would place consultation with hematology for macrocytosis.   167.6

## 2019-08-29 NOTE — PROGRESS NOTE ADULT - SUBJECTIVE AND OBJECTIVE BOX
Magan Potter M.D.  PGY1  [phone] / [phone]    Interval Hx/subjective: c/o of new onset lower left leg pain radiating up and down from shin to mid-thigh similar to that which was experienced when he fell two months ago and broke his knee.  Pain was relieved with Toradol overnight.  Pain prevented the patient from sleeping very well.    ROS:  CON: denied fever, chills, lightheaded, dizziness  HEENT: continued change to vision; denied changes to smell, hearing, taste; denied throat pain, chest pain  CV: denied chest pain, racing heart, skipped beats  RESP: denied SOB, cough, wheezing  GI: denied nausea, vomiting, heartburn; diarrhea, constipation, dark or bloody stool  : denied flank pain; pain or burning with urination; bloody urine  MSK: denied joint pain or swelling; denied muscle ache  ENDO: denied heat or cold intolerance  NEURO: endorsed trouble finding words; denied headache, slurred speech; tingling, numbness  SKIN: denied itching, rash, other skin changes    Medications Standing  MEDICATIONS  (STANDING):  dexamethasone     Tablet 1.5 milliGRAM(s) Oral three times a day  docusate sodium 100 milliGRAM(s) Oral three times a day  levETIRAcetam 500 milliGRAM(s) Oral two times a day  QUEtiapine 25 milliGRAM(s) Oral at bedtime    Medications PRN  MEDICATIONS  (PRN):  acetaminophen   Tablet .. 650 milliGRAM(s) Oral every 6 hours PRN Temp greater or equal to 38C (100.4F), Mild Pain (1 - 3), Moderate Pain (4 - 6)  ibuprofen  Tablet. 200 milliGRAM(s) Oral every 6 hours PRN Mild Pain (1 - 3)  ibuprofen  Tablet. 600 milliGRAM(s) Oral three times a day PRN Moderate Pain (4 - 6)  ondansetron Injectable 4 milliGRAM(s) IV Push every 6 hours PRN Nausea      Objective:  T(C): 36.6 (08-29-19 @ 06:29), Max: 36.9 (08-28-19 @ 15:35)  HR: 73 (08-29-19 @ 06:29) (58 - 78)  BP: 124/69 (08-29-19 @ 06:29) (107/70 - 125/72)  RR: 18 (08-29-19 @ 06:29) (17 - 18)  SpO2: 99% (08-29-19 @ 06:29) (96% - 99%)    Physical Exam:  GEN: loquacious gentleman not in acute distress  HEENT: PERRL, EOMI; mucous moist; neck supple without LAD  CV: RRR, S1, S2; no M/R/G; good capillary refill  PULM: LCTAB; not using accessory muscles  ABD: normal bowel sounds; non-distended, soft; non-tender; no rebound, no guarding  Extremities: no clubbing, cyanosis; no edema; DP and radial pulses palpable  NEURO: AAOx3; no FND    < from: MR Head w/wo IV Cont (08.28.19 @ 20:10) >  EXAM:  MR BRAIN WAW IC        PROCEDURE DATE:  Aug 28 2019         INTERPRETATION:  INDICATIONS:  Multiple brain lesions seen on CT   concerning for metastatic disease.    TECHNIQUE:  Multiplanar imaging was performed using T1 weighted, T2   weighted and FLAIR sequences.  Diffusion weighted and susceptibility   sensitive images were also obtained.  Following intravenous gadolinium,   multiplanar T1 weighted images were performed. 6 cc Gadavist were   administered. 1.5 cc were discarded.      COMPARISON EXAMINATION:  Brain CT 8/28/2019      FINDINGS:    VENTRICLES AND SULCI:  Prominent in size compatible with age-appropriate   volume loss.  INTRA-AXIAL:  Multiple enhancing masses are seen throughout the cerebral   and cerebellar hemispheres as seen on the prior CT scan. Several lesions   demonstrate susceptibility artifact with or without precontrast T1   hyperintensity and heterogeneous T2 signal compatible with associated   hemorrhage. Several lesions are associated with vasogenic edema.  A left occipital mass with significant edema measures 1.4 x 1.3 cm.   A mass within the vermis measures 2.3 x 1.4 cm.   A mass within the right cerebellar hemisphere measures 1.8 x 1.6 cm.   A mass within the left cerebellum involving the cerebellar tonsil   measures 1.8 x 1.8 cm and an adjacent mass within or projecting into the   fourth ventricle measures 1.5 x 1 cm effacing the fourth ventricular   lumen. This nodule significantly indents the adjacent medulla.    Scattered foci of white matter T2 hyperintensity are seen compatible with   mild to moderate microvascular type white matter changes.  EXTRA-AXIAL:  No mass or collection.  VISUALIZED SINUSES:  Normal.  VISUALIZED MASTOIDS:  Clear.  CALVARIUM:  Normal.  CAROTID FLOW VOIDS:  Present.  MISCELLANEOUS:  None.      IMPRESSION:    Multiple intraparenchymal enhancing masses several of which are   associated with hemorrhage and/or vasogenic edema. Metastatic disease is   a prime consideration.    2 adjacent lesions efface the fourth ventricular lumen however no   significant hydrocephalus is seen at this time.    < end of copied text >

## 2019-08-29 NOTE — CONSULT NOTE ADULT - SUBJECTIVE AND OBJECTIVE BOX
HPI: 69 yo M with no reported medical history, former smoker (52 pack years, quit 20 yrs ago) p/w one month history of worsening ataxia found to have multiple brain lesions with vasogenic edema and b/l spiculated lung nodules for which Oncology is consulted.    Pt was seen by a doctor in [facility], was found to have masses in the brain by imaging and came to the [place] for further work-up. He reports having mild ataxia for approximately 1 year, before it acutely worsened approximately 1 month ago. He had a witnessed episode of syncope, without associated shaking or post-ictal state. Since then, he has had severe ataxia whenever he stands up, requiring him to use a cane to walk. The ataxia is somewhat improved after sleeping, otherwise nothing has helped alleviate the symptoms, including Betahistine Dihydrochloride (for vertigo and Meniere’s disease) given in [facility]. He reports the severity of his ataxia has been constant for the past month. About 2 weeks ago, the patient experienced a sharp, 10/10 headache with 1 episode of non-bloody vomiting. He described the headache as the worst of his life, with associated sensitivity to bright lights and loud sounds. He has had intermitted episodes of headache since then that have not been as severe. He has not vomited since then. The patient also describes new blurriness in his peripheral vision bilaterally and 1 week of mild tremor. He reports subjective weight loss over the past month, estimated to be ~3kg, as noticed by a decreased arm circumference. No fevers or chills. No shortness of breath, chest pain, cough, hemoptysis, or fatigue. Apart from the single episode of vomiting with his headache, he has had no nausea, vomiting, diarrhea, or constipation. No hearing loss or difficulty chewing.    In the ED, pt had T 97.7, HT 87, /81, RR 16, SpO2 99%RA.  He had leukocytosis to 19.59 (s/p steroids in [facility]) without bandemia, and transaminitis w/ AST 48, , and alk phos of 126. (28 Aug 2019 18:25)    PAST MEDICAL & SURGICAL HISTORY:  No pertinent past medical history: patient does not formally see doctors, just speaks to his friends  No significant past surgical history  Allergies    No Known Allergies    Intolerances    MEDICATIONS  (STANDING):  dexamethasone     Tablet 1.5 milliGRAM(s) Oral three times a day  docusate sodium 100 milliGRAM(s) Oral three times a day  levETIRAcetam 500 milliGRAM(s) Oral two times a day  QUEtiapine 25 milliGRAM(s) Oral at bedtime    MEDICATIONS  (PRN):  acetaminophen   Tablet .. 650 milliGRAM(s) Oral every 6 hours PRN Temp greater or equal to 38C (100.4F), Mild Pain (1 - 3), Moderate Pain (4 - 6)  ibuprofen  Tablet. 200 milliGRAM(s) Oral every 6 hours PRN Mild Pain (1 - 3)  ibuprofen  Tablet. 600 milliGRAM(s) Oral three times a day PRN Moderate Pain (4 - 6)  LORazepam     Tablet 0.5 milliGRAM(s) Oral once PRN Anxiety  ondansetron Injectable 4 milliGRAM(s) IV Push every 6 hours PRN Nausea    SOCIAL HISTORY: see HPI    REVIEW OF SYSTEMS: see HPI  All other review of systems is negative unless indicated above    Height (cm): 167.6 (08-28 @ 19:12)  Weight (kg): 59.6 (08-28 @ 19:12)  BMI (kg/m2): 21.2 (08-28 @ 19:12)  BSA (m2): 1.67 (08-28 @ 19:12)    VITALS:   T(F): 96.3 (08-29-19 @ 13:33), Max: 98.4 (08-28-19 @ 15:35)  HR: 60 (08-29-19 @ 13:33)  BP: 137/79 (08-29-19 @ 13:33)  RR: 18 (08-29-19 @ 13:33)  SpO2: 100% (08-29-19 @ 13:33)  Wt(kg): --    PHYSICAL EXAM:  GENERAL: resting in bed comfortably  EYES: EOMI, conjunctiva and sclera clear  NECK: supple  RESP: CTAB  HEART: RRR, S1/S2  ABDOMEN: +BS, soft, NT, ND  EXTREMITIES: no LE edema  NEUROLOGIC: AAO x 3, b/l LE strength intact    LABS:                         13.9   20.88 )-----------( 292      ( 29 Aug 2019 06:13 )             41.5       08-29    136  |  97<L>  |  21  ----------------------------<  106<H>  4.5   |  25  |  0.81    Ca    9.2      29 Aug 2019 06:13  Phos  3.5     08-29  Mg     2.5     08-29    TPro  6.9  /  Alb  4.1  /  TBili  0.3  /  DBili  x   /  AST  38  /  ALT  207<H>  /  AlkPhos  122<H>  08-29    Magnesium, Serum: 2.5 mg/dL (08-29 @ 06:13)  Phosphorus Level, Serum: 3.5 mg/dL (08-29 @ 06:13)  Magnesium, Serum: 2.2 mg/dL (08-28 @ 19:58)  Phosphorus Level, Serum: 3.4 mg/dL (08-28 @ 19:58)    PT/INR - ( 28 Aug 2019 19:58 )   PT: 10.4 SEC;   INR: 0.91     PTT - ( 28 Aug 2019 19:58 )  PTT:26.1 SEC    IMAGING:  - MR Head w/wo IV Cont (08.28.19 @ 20:10) >  IMPRESSION:  Multiple intraparenchymal enhancing masses several of which are associated with hemorrhage and/or vasogenic edema. Metastatic disease is a prime consideration. 2 adjacent lesions efface the fourth ventricular lumen however no significant hydrocephalus is seen at this time.  - CT Chest, Abd, Pelvis w/ IV Cont (08.28.19 @ 12:00) >  IMPRESSION: Emphysema with bilateral solid pulmonary nodules measuring 2.3 cm in the right upper lobe and 0.7 cm in the left upper lobe. Additional groundglass and mixed solid and ground glass nodular opacities bilaterally. Pulmonary nodules are indeterminate, but may be seen in the setting of a primary lung malignancy. No intrathoracic lymphadenopathy. No solid organ lesion or lymphadenopathy in the abdomen or pelvis.

## 2019-08-29 NOTE — PROGRESS NOTE ADULT - SUBJECTIVE AND OBJECTIVE BOX
full note to follow CHIEF COMPLAINT:    Interval Events: Complaints of severe chronic leg pain overnight. No respiratory complaints. For possible navigational bronchoscopy today.     REVIEW OF SYSTEMS:  Constitutional: [ ] negative [ ] fevers [ ] chills [ ] weight loss [ ] weight gain  HEENT: [ ] negative [ ] dry eyes [ ] eye irritation [ ] postnasal drip [ ] nasal congestion  CV: [ ] negative  [ ] chest pain [ ] orthopnea [ ] palpitations [ ] murmur  Resp: [ ] negative [ ] cough [ ] shortness of breath [ ] dyspnea [ ] wheezing [ ] sputum [ ] hemoptysis  GI: [ ] negative [ ] nausea [ ] vomiting [ ] diarrhea [ ] constipation [ ] abd pain [ ] dysphagia   : [ ] negative [ ] dysuria [ ] nocturia [ ] hematuria [ ] increased urinary frequency  Musculoskeletal: [ ] negative [ ] back pain [X] myalgias [ ] arthralgias [ ] fracture  Skin: [ ] negative [ ] rash [ ] itch  Neurological: [ ] negative [ ] headache [X] dizziness [ ] syncope [ ] weakness [ ] numbness  Psychiatric: [ ] negative [ ] anxiety [ ] depression  Endocrine: [ ] negative [ ] diabetes [ ] thyroid problem  Hematologic/Lymphatic: [ ] negative [ ] anemia [ ] bleeding problem  Allergic/Immunologic: [ ] negative [ ] itchy eyes [ ] nasal discharge [ ] hives [ ] angioedema  [X] All other systems negative  [ ] Unable to assess ROS because ________    OBJECTIVE:  ICU Vital Signs Last 24 Hrs  T(C): 36.6 (29 Aug 2019 06:29), Max: 36.9 (28 Aug 2019 15:35)  T(F): 97.8 (29 Aug 2019 06:29), Max: 98.4 (28 Aug 2019 15:35)  HR: 73 (29 Aug 2019 06:29) (58 - 80)  BP: 124/69 (29 Aug 2019 06:29) (107/70 - 127/63)  BP(mean): --  ABP: --  ABP(mean): --  RR: 18 (29 Aug 2019 06:29) (16 - 18)  SpO2: 99% (29 Aug 2019 06:29) (96% - 99%)        CAPILLARY BLOOD GLUCOSE          PHYSICAL EXAM:  General: NAD, resting comfortably  HEENT: EOMI, PERRLA  Respiratory: CTAB  Cardiovascular: S1S2, RRR, no murmurs  Abdomen: Soft, NTND, BS+  Extremities: No peripheral edema    HOSPITAL MEDICATIONS:  MEDICATIONS  (STANDING):  dexamethasone     Tablet 1.5 milliGRAM(s) Oral three times a day  docusate sodium 100 milliGRAM(s) Oral three times a day  levETIRAcetam 500 milliGRAM(s) Oral two times a day  QUEtiapine 25 milliGRAM(s) Oral at bedtime    MEDICATIONS  (PRN):  acetaminophen   Tablet .. 650 milliGRAM(s) Oral every 6 hours PRN Temp greater or equal to 38C (100.4F), Mild Pain (1 - 3), Moderate Pain (4 - 6)  ibuprofen  Tablet. 200 milliGRAM(s) Oral every 6 hours PRN Mild Pain (1 - 3)  ibuprofen  Tablet. 600 milliGRAM(s) Oral three times a day PRN Moderate Pain (4 - 6)  ondansetron Injectable 4 milliGRAM(s) IV Push every 6 hours PRN Nausea      LABS:                        13.9   20.88 )-----------( 292      ( 29 Aug 2019 06:13 )             41.5     Hgb Trend: 13.9<--, 13.0<--, 13.3<--  08-29    136  |  97<L>  |  21  ----------------------------<  106<H>  4.5   |  25  |  0.81    Ca    9.2      29 Aug 2019 06:13  Phos  3.5     08-29  Mg     2.5     08-29    TPro  6.9  /  Alb  4.1  /  TBili  0.3  /  DBili  x   /  AST  38  /  ALT  207<H>  /  AlkPhos  122<H>  08-29    Creatinine Trend: 0.81<--, 0.80<--, 0.88<--  PT/INR - ( 28 Aug 2019 19:58 )   PT: 10.4 SEC;   INR: 0.91          PTT - ( 28 Aug 2019 19:58 )  PTT:26.1 SEC          MICROBIOLOGY:       RADIOLOGY:  [ ] Reviewed and interpreted by me    PULMONARY FUNCTION TESTS:    EKG:

## 2019-08-29 NOTE — PROGRESS NOTE ADULT - PROBLEM SELECTOR PLAN 2
primary lung CA in pt with 50 pack year smoking hx vs. 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

## 2019-08-29 NOTE — PROVIDER CONTACT NOTE (OTHER) - ASSESSMENT
pt left unit for bronchoscopy, no distress, other vs stable. pt temp increased as per bronchoscopy RN

## 2019-08-29 NOTE — PROGRESS NOTE ADULT - PROBLEM SELECTOR PLAN 1
- Will be add on for navigation bronchoscopy today  - f/u biopsy results   - Duonebs PRN given CT suggestive of emphysema    Larry So MD  Pulmonary & Critical Care Fellow  [phone]

## 2019-08-29 NOTE — PROGRESS NOTE ADULT - PROBLEM SELECTOR PLAN 4
diet: regular; NPO after midnight for possible bronch tomorrow  VTE: SCDs in the setting of possible hemorrhage from brain lesion    Discussed with Dr. Mccrary possible metastatic dx in the setting of findings above in subcentimeter hypodense region seen on CT abdomen  - s/p abdominal U/S

## 2019-08-29 NOTE — PRE-OP CHECKLIST - HAIR REMOVAL
4/13/2018          To Whom It May Concern:    Vernice Blizzard is currently under my medical care and has recently undergone a major surgical procedure and was in the hospital 3/31/18-4/5/18. Sonny Mancuso is his wife.  He required her presence in th
hair removal not indicated

## 2019-08-29 NOTE — PROGRESS NOTE ADULT - PROBLEM SELECTOR PLAN 1
multiple lesions seen on CTH noncon and redemonstrated on MR Brain most likely representing metastasis disease from suspected primary lung CA  - vision change likely 2/2 to occipital lesions  - ataxia likely 2/2 to cerebellar lesions  - neurosurgery c/s, recs reviewed and appreciated  - c/w previous dose of dexamethasone 1.5 mg TID; leukocytosis w/ bands likely 2/2 to steroid use  - c/w levetiracetam 500 mg BID for seizure ppx  - oncology c/s, recs appreciated  - fall risk precautions multiple lesions seen on CTH noncon and redemonstrated on MR Brain most likely representing metastasis disease from suspected primary lung CA  - vision change likely 2/2 to occipital lesions  - ataxia likely 2/2 to cerebellar lesions  - neurosurgery c/s, recs reviewed and appreciated  - c/w previous dose of dexamethasone 1.5 mg TID; leukocytosis w/ bands likely 2/2 to steroid use  - c/w levetiracetam 500 mg BID for seizure ppx  - oncology c/s, recs appreciated  - fall risk precautions  - PT c/s on pt with worsening ataxia 2/2 to brain metastases

## 2019-08-29 NOTE — PROGRESS NOTE ADULT - ASSESSMENT
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  Pending bronchoscopy for tissue biopsy.  Onc c/s.

## 2019-08-29 NOTE — PROGRESS NOTE ADULT - PROBLEM SELECTOR PLAN 5
pt reported overnight on 8/28-29 lower left leg pain radiating up and down similar to pain previously reported s/p fall with avulsion fracture per pt  - XR left ankle and XR left knee

## 2019-08-30 LAB
ALBUMIN SERPL ELPH-MCNC: 3.7 G/DL — SIGNIFICANT CHANGE UP (ref 3.3–5)
ALP SERPL-CCNC: 103 U/L — SIGNIFICANT CHANGE UP (ref 40–120)
ALT FLD-CCNC: 143 U/L — HIGH (ref 4–41)
ANION GAP SERPL CALC-SCNC: 14 MMO/L — SIGNIFICANT CHANGE UP (ref 7–14)
AST SERPL-CCNC: 20 U/L — SIGNIFICANT CHANGE UP (ref 4–40)
BASOPHILS # BLD AUTO: 0.07 K/UL — SIGNIFICANT CHANGE UP (ref 0–0.2)
BASOPHILS NFR BLD AUTO: 0.4 % — SIGNIFICANT CHANGE UP (ref 0–2)
BILIRUB SERPL-MCNC: 0.4 MG/DL — SIGNIFICANT CHANGE UP (ref 0.2–1.2)
BUN SERPL-MCNC: 24 MG/DL — HIGH (ref 7–23)
CALCIUM SERPL-MCNC: 8.7 MG/DL — SIGNIFICANT CHANGE UP (ref 8.4–10.5)
CHLORIDE SERPL-SCNC: 93 MMOL/L — LOW (ref 98–107)
CO2 SERPL-SCNC: 24 MMOL/L — SIGNIFICANT CHANGE UP (ref 22–31)
CREAT SERPL-MCNC: 0.79 MG/DL — SIGNIFICANT CHANGE UP (ref 0.5–1.3)
EOSINOPHIL # BLD AUTO: 0.07 K/UL — SIGNIFICANT CHANGE UP (ref 0–0.5)
EOSINOPHIL NFR BLD AUTO: 0.4 % — SIGNIFICANT CHANGE UP (ref 0–6)
GLUCOSE SERPL-MCNC: 103 MG/DL — HIGH (ref 70–99)
HCT VFR BLD CALC: 40.1 % — SIGNIFICANT CHANGE UP (ref 39–50)
HGB BLD-MCNC: 13.2 G/DL — SIGNIFICANT CHANGE UP (ref 13–17)
IMM GRANULOCYTES NFR BLD AUTO: 3.6 % — HIGH (ref 0–1.5)
LYMPHOCYTES # BLD AUTO: 1.87 K/UL — SIGNIFICANT CHANGE UP (ref 1–3.3)
LYMPHOCYTES # BLD AUTO: 10.5 % — LOW (ref 13–44)
MAGNESIUM SERPL-MCNC: 2.5 MG/DL — SIGNIFICANT CHANGE UP (ref 1.6–2.6)
MCHC RBC-ENTMCNC: 28.9 PG — SIGNIFICANT CHANGE UP (ref 27–34)
MCHC RBC-ENTMCNC: 32.9 % — SIGNIFICANT CHANGE UP (ref 32–36)
MCV RBC AUTO: 87.9 FL — SIGNIFICANT CHANGE UP (ref 80–100)
MONOCYTES # BLD AUTO: 1.43 K/UL — HIGH (ref 0–0.9)
MONOCYTES NFR BLD AUTO: 8.1 % — SIGNIFICANT CHANGE UP (ref 2–14)
NEUTROPHILS # BLD AUTO: 13.66 K/UL — HIGH (ref 1.8–7.4)
NEUTROPHILS NFR BLD AUTO: 77 % — SIGNIFICANT CHANGE UP (ref 43–77)
NRBC # FLD: 0 K/UL — SIGNIFICANT CHANGE UP (ref 0–0)
PHOSPHATE SERPL-MCNC: 4 MG/DL — SIGNIFICANT CHANGE UP (ref 2.5–4.5)
PLATELET # BLD AUTO: 278 K/UL — SIGNIFICANT CHANGE UP (ref 150–400)
PMV BLD: 11.1 FL — SIGNIFICANT CHANGE UP (ref 7–13)
POTASSIUM SERPL-MCNC: 4.5 MMOL/L — SIGNIFICANT CHANGE UP (ref 3.5–5.3)
POTASSIUM SERPL-SCNC: 4.5 MMOL/L — SIGNIFICANT CHANGE UP (ref 3.5–5.3)
PROT SERPL-MCNC: 6.5 G/DL — SIGNIFICANT CHANGE UP (ref 6–8.3)
RBC # BLD: 4.56 M/UL — SIGNIFICANT CHANGE UP (ref 4.2–5.8)
RBC # FLD: 13.6 % — SIGNIFICANT CHANGE UP (ref 10.3–14.5)
SODIUM SERPL-SCNC: 131 MMOL/L — LOW (ref 135–145)
WBC # BLD: 17.74 K/UL — HIGH (ref 3.8–10.5)
WBC # FLD AUTO: 17.74 K/UL — HIGH (ref 3.8–10.5)

## 2019-08-30 PROCEDURE — 73610 X-RAY EXAM OF ANKLE: CPT | Mod: 26,LT

## 2019-08-30 PROCEDURE — 99232 SBSQ HOSP IP/OBS MODERATE 35: CPT | Mod: GC

## 2019-08-30 PROCEDURE — 73562 X-RAY EXAM OF KNEE 3: CPT | Mod: 26,LT

## 2019-08-30 PROCEDURE — 99233 SBSQ HOSP IP/OBS HIGH 50: CPT | Mod: GC

## 2019-08-30 RX ORDER — MECLIZINE HCL 12.5 MG
25 TABLET ORAL
Refills: 0 | Status: DISCONTINUED | OUTPATIENT
Start: 2019-08-30 | End: 2019-09-06

## 2019-08-30 RX ADMIN — Medication 25 MILLIGRAM(S): at 17:43

## 2019-08-30 RX ADMIN — Medication 100 MILLIGRAM(S): at 21:25

## 2019-08-30 RX ADMIN — Medication 1.5 MILLIGRAM(S): at 05:43

## 2019-08-30 RX ADMIN — Medication 100 MILLIGRAM(S): at 05:43

## 2019-08-30 RX ADMIN — QUETIAPINE FUMARATE 25 MILLIGRAM(S): 200 TABLET, FILM COATED ORAL at 21:25

## 2019-08-30 RX ADMIN — ENOXAPARIN SODIUM 40 MILLIGRAM(S): 100 INJECTION SUBCUTANEOUS at 21:25

## 2019-08-30 RX ADMIN — Medication 1.5 MILLIGRAM(S): at 21:25

## 2019-08-30 RX ADMIN — ONDANSETRON 4 MILLIGRAM(S): 8 TABLET, FILM COATED ORAL at 17:42

## 2019-08-30 RX ADMIN — Medication 100 MILLIGRAM(S): at 13:35

## 2019-08-30 RX ADMIN — LEVETIRACETAM 500 MILLIGRAM(S): 250 TABLET, FILM COATED ORAL at 09:55

## 2019-08-30 RX ADMIN — Medication 1.5 MILLIGRAM(S): at 13:35

## 2019-08-30 RX ADMIN — LEVETIRACETAM 500 MILLIGRAM(S): 250 TABLET, FILM COATED ORAL at 21:25

## 2019-08-30 NOTE — PROGRESS NOTE ADULT - ATTENDING COMMENTS
Patient seen and examined, d/w Dr. Potter, agree with above with following additions:     67 yo M former smoker p/w neurological symptoms, found to have brain mets with suspicion for lung as primary, s/p bronchoscopy and biopsy. Appreciate pulm, Nsx, and onc input. C/w keppra and steroids. Await biopsy results to determine oncological plan, will need to followup with rad/onc in coming week to discuss potential for radiation therapy.

## 2019-08-30 NOTE — PROGRESS NOTE ADULT - SUBJECTIVE AND OBJECTIVE BOX
ANESTHESIA POSTOP CHECK    68y Male POSTOP DAY 1 S/P     [ X] NO APPARENT ANESTHESIA COMPLICATIONS      Comments:

## 2019-08-30 NOTE — PHYSICAL THERAPY INITIAL EVALUATION ADULT - LEVEL OF INDEPENDENCE, REHAB EVAL
unable to attain sitting position due to c/o dizziness with Tesha, RN aware/dependent (less than 25% patients effort)

## 2019-08-30 NOTE — PROGRESS NOTE ADULT - PROBLEM SELECTOR PLAN 6
diet: regular; NPO after midnight for possible bronch tomorrow  VTE: SCDs in the setting of possible hemorrhage from brain lesion    Discussed with Dr. Mccrary diet: regular; NPO after midnight for possible bronch tomorrow  VTE: SCDs in the setting of possible hemorrhage from brain lesion    Discussed with Dr. Vargas diet: regular  VTE: SCDs in the setting of possible hemorrhage from brain lesion    Discussed with Dr. Vargas

## 2019-08-30 NOTE — PROGRESS NOTE ADULT - ASSESSMENT
Patient is a 67 yo M from [facility] with unclear PMH who presents with unsteady gait and vertigo symptoms for about 1.5 months found on CT imaging to have multiple brain lesions concerning for metastatic disease  and bilateral solid pulmonary nodules measuring 2.3 cm in RUL and 0.7 cm REINALDO as well as additional groundglass and mixed solid and ground glass nodular opacities bilaterally. S/P navigational bronchoscopy for biopsy.

## 2019-08-30 NOTE — PROGRESS NOTE ADULT - PROBLEM SELECTOR PLAN 3
primary lung CA in pt with 50 pack year smoking hx vs. metastatic dx from unknown primary  - pulmonology c/s; plan for bronchoscopy w/ bx today  - currently NPO; plan to resume regular diet as tolerated post procedure  - f/u bronch primary lung CA in pt with 50 pack year smoking hx vs. metastatic dx from unknown primary  - pulmonology appreciated, s/p bronchoscopy w/ bx   - f/u biopsy results

## 2019-08-30 NOTE — PROGRESS NOTE ADULT - SUBJECTIVE AND OBJECTIVE BOX
Magan Potter M.D.  PGY1  [phone] / [id number]    Interval Hx/subjective: s/p liver US, bronchoscopy; was hypothermic in the bronch suite preop to 96.3 but temp increased to 98 back on the floors    ROS:  CON: denied fever, chills, lightheaded, dizziness  HEENT: continued change to vision; denied changes to smell, hearing, taste; denied throat pain, chest pain  CV: denied chest pain, racing heart, skipped beats  RESP: denied SOB, cough, wheezing  GI: denied nausea, vomiting, heartburn; diarrhea, constipation, dark or bloody stool  : denied flank pain; pain or burning with urination; bloody urine  MSK: denied joint pain or swelling; denied muscle ache  ENDO: denied heat or cold intolerance  NEURO: endorsed trouble finding words; denied headache, slurred speech; tingling, numbness  SKIN: denied itching, rash, other skin changes    MEDICATIONS  (STANDING):  dexamethasone     Tablet 1.5 milliGRAM(s) Oral three times a day  docusate sodium 100 milliGRAM(s) Oral three times a day  enoxaparin Injectable 40 milliGRAM(s) SubCutaneous at bedtime  levETIRAcetam 500 milliGRAM(s) Oral two times a day  QUEtiapine 25 milliGRAM(s) Oral at bedtime    MEDICATIONS  (PRN):  acetaminophen   Tablet .. 650 milliGRAM(s) Oral every 6 hours PRN Temp greater or equal to 38C (100.4F), Mild Pain (1 - 3), Moderate Pain (4 - 6)  ibuprofen  Tablet. 200 milliGRAM(s) Oral every 6 hours PRN Mild Pain (1 - 3)  ibuprofen  Tablet. 600 milliGRAM(s) Oral three times a day PRN Moderate Pain (4 - 6)  LORazepam     Tablet 0.5 milliGRAM(s) Oral once PRN Anxiety  ondansetron Injectable 4 milliGRAM(s) IV Push every 6 hours PRN Nausea    Objective:  Vital Signs Last 24 Hrs  T(C): 36.7 (30 Aug 2019 05:29), Max: 36.7 (29 Aug 2019 19:57)  T(F): 98 (30 Aug 2019 05:29), Max: 98.1 (29 Aug 2019 19:57)  HR: 66 (30 Aug 2019 05:29) (60 - 81)  BP: 105/64 (30 Aug 2019 05:29) (105/64 - 137/79)  BP(mean): 83 (29 Aug 2019 18:15) (75 - 94)  RR: 16 (30 Aug 2019 05:29) (12 - 18)  SpO2: 98% (30 Aug 2019 05:29) (93% - 100%)    Physical Exam:  GEN: loquacious gentleman not in acute distress  HEENT: PERRL, EOMI; mucous moist; neck supple without LAD  CV: RRR, S1, S2; no M/R/G; good capillary refill  PULM: LCTAB; not using accessory muscles  ABD: normal bowel sounds; non-distended, soft; non-tender; no rebound, no guarding  Extremities: no clubbing, cyanosis; no edema; DP and radial pulses palpable  NEURO: AAOx3; no FND    < from: MR Head w/wo IV Cont (08.28.19 @ 20:10) >  EXAM:  MR BRAIN WAW IC        PROCEDURE DATE:  Aug 28 2019         INTERPRETATION:  INDICATIONS:  Multiple brain lesions seen on CT   concerning for metastatic disease.    TECHNIQUE:  Multiplanar imaging was performed using T1 weighted, T2   weighted and FLAIR sequences.  Diffusion weighted and susceptibility   sensitive images were also obtained.  Following intravenous gadolinium,   multiplanar T1 weighted images were performed. 6 cc Gadavist were   administered. 1.5 cc were discarded.      COMPARISON EXAMINATION:  Brain CT 8/28/2019      FINDINGS:    VENTRICLES AND SULCI:  Prominent in size compatible with age-appropriate   volume loss.  INTRA-AXIAL:  Multiple enhancing masses are seen throughout the cerebral   and cerebellar hemispheres as seen on the prior CT scan. Several lesions   demonstrate susceptibility artifact with or without precontrast T1   hyperintensity and heterogeneous T2 signal compatible with associated   hemorrhage. Several lesions are associated with vasogenic edema.  A left occipital mass with significant edema measures 1.4 x 1.3 cm.   A mass within the vermis measures 2.3 x 1.4 cm.   A mass within the right cerebellar hemisphere measures 1.8 x 1.6 cm.   A mass within the left cerebellum involving the cerebellar tonsil   measures 1.8 x 1.8 cm and an adjacent mass within or projecting into the   fourth ventricle measures 1.5 x 1 cm effacing the fourth ventricular   lumen. This nodule significantly indents the adjacent medulla.    Scattered foci of white matter T2 hyperintensity are seen compatible with   mild to moderate microvascular type white matter changes.  EXTRA-AXIAL:  No mass or collection.  VISUALIZED SINUSES:  Normal.  VISUALIZED MASTOIDS:  Clear.  CALVARIUM:  Normal.  CAROTID FLOW VOIDS:  Present.  MISCELLANEOUS:  None.      IMPRESSION:    Multiple intraparenchymal enhancing masses several of which are   associated with hemorrhage and/or vasogenic edema. Metastatic disease is   a prime consideration.    2 adjacent lesions efface the fourth ventricular lumen however no   significant hydrocephalus is seen at this time.    < end of copied text >      BRONCHOSCOPY RESULTS PENDING Magan Potter M.D.  PGY1  [phone] / [id number]    Interval Hx: s/p liver US, bronchoscopy; was hypothermic in the bronch suite preop to 96.3 but temp increased to 98 back on the floors    Subjective: brother at bedside; pt did not complain of being cold; still c/o of lower leg pain, radiating, preventing him from sleeping; had ibuprofen last evening, with some relief; did not ask for tramadol    ROS:  CON: denied fever, chills, lightheaded, dizziness  HEENT: continued change to vision; denied changes to smell, hearing, taste; denied throat pain, chest pain  CV: denied chest pain, racing heart, skipped beats  RESP: denied SOB, cough, wheezing  GI: denied nausea, vomiting, heartburn; diarrhea, constipation, dark or bloody stool  : denied flank pain; pain or burning with urination; bloody urine  MSK: denied joint pain or swelling; denied muscle ache  ENDO: denied heat or cold intolerance  NEURO: endorsed trouble finding words; denied headache, slurred speech; tingling, numbness  SKIN: denied itching, rash, other skin changes    MEDICATIONS  (STANDING):  dexamethasone     Tablet 1.5 milliGRAM(s) Oral three times a day  docusate sodium 100 milliGRAM(s) Oral three times a day  enoxaparin Injectable 40 milliGRAM(s) SubCutaneous at bedtime  levETIRAcetam 500 milliGRAM(s) Oral two times a day  QUEtiapine 25 milliGRAM(s) Oral at bedtime    MEDICATIONS  (PRN):  acetaminophen   Tablet .. 650 milliGRAM(s) Oral every 6 hours PRN Temp greater or equal to 38C (100.4F), Mild Pain (1 - 3), Moderate Pain (4 - 6)  ibuprofen  Tablet. 200 milliGRAM(s) Oral every 6 hours PRN Mild Pain (1 - 3)  ibuprofen  Tablet. 600 milliGRAM(s) Oral three times a day PRN Moderate Pain (4 - 6)  LORazepam     Tablet 0.5 milliGRAM(s) Oral once PRN Anxiety  ondansetron Injectable 4 milliGRAM(s) IV Push every 6 hours PRN Nausea    Objective:  Vital Signs Last 24 Hrs  T(C): 36.7 (30 Aug 2019 05:29), Max: 36.7 (29 Aug 2019 19:57)  T(F): 98 (30 Aug 2019 05:29), Max: 98.1 (29 Aug 2019 19:57)  HR: 66 (30 Aug 2019 05:29) (60 - 81)  BP: 105/64 (30 Aug 2019 05:29) (105/64 - 137/79)  BP(mean): 83 (29 Aug 2019 18:15) (75 - 94)  RR: 16 (30 Aug 2019 05:29) (12 - 18)  SpO2: 98% (30 Aug 2019 05:29) (93% - 100%)    Physical Exam:  GEN: NAD, less active this morning  HEENT: PERRL, EOMI; mucous moist; neck supple without LAD  CV: RRR, S1, S2; no M/R/G; good capillary refill  PULM: LCTAB; not using accessory muscles  ABD: normal bowel sounds; non-distended, soft; non-tender; no rebound, no guarding  Extremities: no clubbing, cyanosis; no edema; DP and radial pulses palpable  NEURO: AAOx3; no FND    < from: MR Head w/wo IV Cont (08.28.19 @ 20:10) >  EXAM:  MR BRAIN WAW IC        PROCEDURE DATE:  Aug 28 2019         INTERPRETATION:  INDICATIONS:  Multiple brain lesions seen on CT   concerning for metastatic disease.    TECHNIQUE:  Multiplanar imaging was performed using T1 weighted, T2   weighted and FLAIR sequences.  Diffusion weighted and susceptibility   sensitive images were also obtained.  Following intravenous gadolinium,   multiplanar T1 weighted images were performed. 6 cc Gadavist were   administered. 1.5 cc were discarded.      COMPARISON EXAMINATION:  Brain CT 8/28/2019      FINDINGS:    VENTRICLES AND SULCI:  Prominent in size compatible with age-appropriate   volume loss.  INTRA-AXIAL:  Multiple enhancing masses are seen throughout the cerebral   and cerebellar hemispheres as seen on the prior CT scan. Several lesions   demonstrate susceptibility artifact with or without precontrast T1   hyperintensity and heterogeneous T2 signal compatible with associated   hemorrhage. Several lesions are associated with vasogenic edema.  A left occipital mass with significant edema measures 1.4 x 1.3 cm.   A mass within the vermis measures 2.3 x 1.4 cm.   A mass within the right cerebellar hemisphere measures 1.8 x 1.6 cm.   A mass within the left cerebellum involving the cerebellar tonsil   measures 1.8 x 1.8 cm and an adjacent mass within or projecting into the   fourth ventricle measures 1.5 x 1 cm effacing the fourth ventricular   lumen. This nodule significantly indents the adjacent medulla.    Scattered foci of white matter T2 hyperintensity are seen compatible with   mild to moderate microvascular type white matter changes.  EXTRA-AXIAL:  No mass or collection.  VISUALIZED SINUSES:  Normal.  VISUALIZED MASTOIDS:  Clear.  CALVARIUM:  Normal.  CAROTID FLOW VOIDS:  Present.  MISCELLANEOUS:  None.      IMPRESSION:    Multiple intraparenchymal enhancing masses several of which are   associated with hemorrhage and/or vasogenic edema. Metastatic disease is   a prime consideration.    2 adjacent lesions efface the fourth ventricular lumen however no   significant hydrocephalus is seen at this time.    < end of copied text >      BRONCHOSCOPY RESULTS PENDING Magan Potter M.D.  PGY1  [phone] / [id number]    Interval Hx: s/p liver US, bronchoscopy; was hypothermic in the bronch suite preop to 96.3 but temp increased to 98 back on the floors    Subjective: brother at bedside; pt did not complain of being cold; still c/o of lower leg pain, radiating, preventing him from sleeping; had ibuprofen last evening, with some relief; did not ask for tramadol    ROS:  CON: denied fever, chills, lightheaded, dizziness  HEENT: continued change to vision; denied changes to smell, hearing, taste; denied throat pain, chest pain  CV: denied chest pain, racing heart, skipped beats  RESP: denied SOB, cough, wheezing  GI: denied nausea, vomiting, heartburn; diarrhea, constipation, dark or bloody stool  : denied flank pain; pain or burning with urination; bloody urine  MSK: left lower leg pain, no swelling; denied muscle ache  ENDO: denied heat or cold intolerance  NEURO: endorsed trouble finding words; denied headache, slurred speech; tingling, numbness  SKIN: denied itching, rash, other skin changes    MEDICATIONS  (STANDING):  dexamethasone     Tablet 1.5 milliGRAM(s) Oral three times a day  docusate sodium 100 milliGRAM(s) Oral three times a day  enoxaparin Injectable 40 milliGRAM(s) SubCutaneous at bedtime  levETIRAcetam 500 milliGRAM(s) Oral two times a day  QUEtiapine 25 milliGRAM(s) Oral at bedtime    MEDICATIONS  (STANDING):  dexamethasone     Tablet 1.5 milliGRAM(s) Oral three times a day  docusate sodium 100 milliGRAM(s) Oral three times a day  enoxaparin Injectable 40 milliGRAM(s) SubCutaneous at bedtime  levETIRAcetam 500 milliGRAM(s) Oral two times a day  meclizine 25 milliGRAM(s) Oral two times a day  QUEtiapine 25 milliGRAM(s) Oral at bedtime    MEDICATIONS  (PRN):  acetaminophen   Tablet .. 650 milliGRAM(s) Oral every 6 hours PRN Temp greater or equal to 38C (100.4F), Mild Pain (1 - 3), Moderate Pain (4 - 6)  ibuprofen  Tablet. 200 milliGRAM(s) Oral every 6 hours PRN Mild Pain (1 - 3)  ibuprofen  Tablet. 600 milliGRAM(s) Oral three times a day PRN Moderate Pain (4 - 6)  LORazepam     Tablet 0.5 milliGRAM(s) Oral once PRN Anxiety  ondansetron Injectable 4 milliGRAM(s) IV Push every 6 hours PRN Nausea    Objective:  Vital Signs Last 24 Hrs  T(C): 36.7 (30 Aug 2019 05:29), Max: 36.7 (29 Aug 2019 19:57)  T(F): 98 (30 Aug 2019 05:29), Max: 98.1 (29 Aug 2019 19:57)  HR: 66 (30 Aug 2019 05:29) (60 - 81)  BP: 105/64 (30 Aug 2019 05:29) (105/64 - 137/79)  BP(mean): 83 (29 Aug 2019 18:15) (75 - 94)  RR: 16 (30 Aug 2019 05:29) (12 - 18)  SpO2: 98% (30 Aug 2019 05:29) (93% - 100%)    Physical Exam:  GEN: NAD, less active this morning  HEENT: PERRL, EOMI; mucous moist; neck supple without LAD  CV: RRR, S1, S2; no M/R/G; good capillary refill  PULM: LCTAB; not using accessory muscles  ABD: normal bowel sounds; non-distended, soft; non-tender; no rebound, no guarding  Extremities: no clubbing, cyanosis; no edema; DP and radial pulses palpable  NEURO: AAOx3; no FND                        13.2   17.74 )-----------( 278      ( 30 Aug 2019 06:50 )             40.1   08-30    131<L>  |  93<L>  |  24<H>  ----------------------------<  103<H>  4.5   |  24  |  0.79    Ca    8.7      30 Aug 2019 06:50  Phos  4.0     08-30  Mg     2.5     08-30    TPro  6.5  /  Alb  3.7  /  TBili  0.4  /  DBili  x   /  AST  20  /  ALT  143<H>  /  AlkPhos  103  08-30      < from: MR Head w/wo IV Cont (08.28.19 @ 20:10) >  EXAM:  MR BRAIN WAW IC        PROCEDURE DATE:  Aug 28 2019         INTERPRETATION:  INDICATIONS:  Multiple brain lesions seen on CT   concerning for metastatic disease.    TECHNIQUE:  Multiplanar imaging was performed using T1 weighted, T2   weighted and FLAIR sequences.  Diffusion weighted and susceptibility   sensitive images were also obtained.  Following intravenous gadolinium,   multiplanar T1 weighted images were performed. 6 cc Gadavist were   administered. 1.5 cc were discarded.      COMPARISON EXAMINATION:  Brain CT 8/28/2019      FINDINGS:    VENTRICLES AND SULCI:  Prominent in size compatible with age-appropriate   volume loss.  INTRA-AXIAL:  Multiple enhancing masses are seen throughout the cerebral   and cerebellar hemispheres as seen on the prior CT scan. Several lesions   demonstrate susceptibility artifact with or without precontrast T1   hyperintensity and heterogeneous T2 signal compatible with associated   hemorrhage. Several lesions are associated with vasogenic edema.  A left occipital mass with significant edema measures 1.4 x 1.3 cm.   A mass within the vermis measures 2.3 x 1.4 cm.   A mass within the right cerebellar hemisphere measures 1.8 x 1.6 cm.   A mass within the left cerebellum involving the cerebellar tonsil   measures 1.8 x 1.8 cm and an adjacent mass within or projecting into the   fourth ventricle measures 1.5 x 1 cm effacing the fourth ventricular   lumen. This nodule significantly indents the adjacent medulla.    Scattered foci of white matter T2 hyperintensity are seen compatible with   mild to moderate microvascular type white matter changes.  EXTRA-AXIAL:  No mass or collection.  VISUALIZED SINUSES:  Normal.  VISUALIZED MASTOIDS:  Clear.  CALVARIUM:  Normal.  CAROTID FLOW VOIDS:  Present.  MISCELLANEOUS:  None.      IMPRESSION:    Multiple intraparenchymal enhancing masses several of which are   associated with hemorrhage and/or vasogenic edema. Metastatic disease is   a prime consideration.    2 adjacent lesions efface the fourth ventricular lumen however no   significant hydrocephalus is seen at this time.    < end of copied text >      BRONCHOSCOPY RESULTS PENDING Magan Potter M.D.  PGY1  [phone] / [phone]    Interval Hx: s/p liver US, bronchoscopy; was hypothermic in the bronch suite preop to 96.3 but temp increased to 98 back on the floors    Subjective: brother at bedside; pt did not complain of being cold; still c/o of lower leg pain, radiating, preventing him from sleeping; had ibuprofen last evening, with some relief; did not ask for tramadol    ROS:  CON: denied fever, chills, lightheaded, dizziness  HEENT: continued change to vision; denied changes to smell, hearing, taste; denied throat pain, chest pain  CV: denied chest pain, racing heart, skipped beats  RESP: denied SOB, cough, wheezing  GI: denied nausea, vomiting, heartburn; diarrhea, constipation, dark or bloody stool  : denied flank pain; pain or burning with urination; bloody urine  MSK: left lower leg pain, no swelling; denied muscle ache  ENDO: denied heat or cold intolerance  NEURO: endorsed trouble finding words; denied headache, slurred speech; tingling, numbness  SKIN: denied itching, rash, other skin changes    MEDICATIONS  (STANDING):  dexamethasone     Tablet 1.5 milliGRAM(s) Oral three times a day  docusate sodium 100 milliGRAM(s) Oral three times a day  enoxaparin Injectable 40 milliGRAM(s) SubCutaneous at bedtime  levETIRAcetam 500 milliGRAM(s) Oral two times a day  QUEtiapine 25 milliGRAM(s) Oral at bedtime    MEDICATIONS  (STANDING):  dexamethasone     Tablet 1.5 milliGRAM(s) Oral three times a day  docusate sodium 100 milliGRAM(s) Oral three times a day  enoxaparin Injectable 40 milliGRAM(s) SubCutaneous at bedtime  levETIRAcetam 500 milliGRAM(s) Oral two times a day  meclizine 25 milliGRAM(s) Oral two times a day  QUEtiapine 25 milliGRAM(s) Oral at bedtime    MEDICATIONS  (PRN):  acetaminophen   Tablet .. 650 milliGRAM(s) Oral every 6 hours PRN Temp greater or equal to 38C (100.4F), Mild Pain (1 - 3), Moderate Pain (4 - 6)  ibuprofen  Tablet. 200 milliGRAM(s) Oral every 6 hours PRN Mild Pain (1 - 3)  ibuprofen  Tablet. 600 milliGRAM(s) Oral three times a day PRN Moderate Pain (4 - 6)  LORazepam     Tablet 0.5 milliGRAM(s) Oral once PRN Anxiety  ondansetron Injectable 4 milliGRAM(s) IV Push every 6 hours PRN Nausea    Objective:  Vital Signs Last 24 Hrs  T(C): 36.7 (30 Aug 2019 05:29), Max: 36.7 (29 Aug 2019 19:57)  T(F): 98 (30 Aug 2019 05:29), Max: 98.1 (29 Aug 2019 19:57)  HR: 66 (30 Aug 2019 05:29) (60 - 81)  BP: 105/64 (30 Aug 2019 05:29) (105/64 - 137/79)  BP(mean): 83 (29 Aug 2019 18:15) (75 - 94)  RR: 16 (30 Aug 2019 05:29) (12 - 18)  SpO2: 98% (30 Aug 2019 05:29) (93% - 100%)    Physical Exam:  GEN: NAD, less active this morning  HEENT: PERRL, EOMI; mucous moist; neck supple without LAD  CV: RRR, S1, S2; no M/R/G; good capillary refill  PULM: LCTAB; not using accessory muscles  ABD: normal bowel sounds; non-distended, soft; non-tender; no rebound, no guarding  Extremities: no clubbing, cyanosis; no edema; DP and radial pulses palpable  NEURO: AAOx3; no FND                        13.2   17.74 )-----------( 278      ( 30 Aug 2019 06:50 )             40.1   08-30    131<L>  |  93<L>  |  24<H>  ----------------------------<  103<H>  4.5   |  24  |  0.79    Ca    8.7      30 Aug 2019 06:50  Phos  4.0     08-30  Mg     2.5     08-30    TPro  6.5  /  Alb  3.7  /  TBili  0.4  /  DBili  x   /  AST  20  /  ALT  143<H>  /  AlkPhos  103  08-30      < from: MR Head w/wo IV Cont (08.28.19 @ 20:10) >  EXAM:  MR BRAIN WAW IC        PROCEDURE DATE:  Aug 28 2019         INTERPRETATION:  INDICATIONS:  Multiple brain lesions seen on CT   concerning for metastatic disease.    TECHNIQUE:  Multiplanar imaging was performed using T1 weighted, T2   weighted and FLAIR sequences.  Diffusion weighted and susceptibility   sensitive images were also obtained.  Following intravenous gadolinium,   multiplanar T1 weighted images were performed. 6 cc Gadavist were   administered. 1.5 cc were discarded.      COMPARISON EXAMINATION:  Brain CT 8/28/2019      FINDINGS:    VENTRICLES AND SULCI:  Prominent in size compatible with age-appropriate   volume loss.  INTRA-AXIAL:  Multiple enhancing masses are seen throughout the cerebral   and cerebellar hemispheres as seen on the prior CT scan. Several lesions   demonstrate susceptibility artifact with or without precontrast T1   hyperintensity and heterogeneous T2 signal compatible with associated   hemorrhage. Several lesions are associated with vasogenic edema.  A left occipital mass with significant edema measures 1.4 x 1.3 cm.   A mass within the vermis measures 2.3 x 1.4 cm.   A mass within the right cerebellar hemisphere measures 1.8 x 1.6 cm.   A mass within the left cerebellum involving the cerebellar tonsil   measures 1.8 x 1.8 cm and an adjacent mass within or projecting into the   fourth ventricle measures 1.5 x 1 cm effacing the fourth ventricular   lumen. This nodule significantly indents the adjacent medulla.    Scattered foci of white matter T2 hyperintensity are seen compatible with   mild to moderate microvascular type white matter changes.  EXTRA-AXIAL:  No mass or collection.  VISUALIZED SINUSES:  Normal.  VISUALIZED MASTOIDS:  Clear.  CALVARIUM:  Normal.  CAROTID FLOW VOIDS:  Present.  MISCELLANEOUS:  None.      IMPRESSION:    Multiple intraparenchymal enhancing masses several of which are   associated with hemorrhage and/or vasogenic edema. Metastatic disease is   a prime consideration.    2 adjacent lesions efface the fourth ventricular lumen however no   significant hydrocephalus is seen at this time.    < end of copied text >      BRONCHOSCOPY RESULTS PENDING    Xray ankle/knee personally reviewed: no fracture/dislocations, no lytic/blastic lesions    Consultant notes reviewed: Pulm, NSx

## 2019-08-30 NOTE — PHYSICAL THERAPY INITIAL EVALUATION ADULT - PERTINENT HX OF CURRENT PROBLEM, REHAB EVAL
68 year old male with no reported medical history and a 52 pack-year smoking hx quit 20 years ago presenting with 1-month history of acutely worsening ataxia.  He was seen by a doctor in [facility], was found to have masses in the brain by imaging and [place] the [place]. He reports having mild ataxia for approximately 1 year, before it acutely worsened approximately 1 month ago. CT head multiple lesions. CT abdomen and pelvis +pulmonary nodules.

## 2019-08-30 NOTE — PROGRESS NOTE ADULT - ATTENDING COMMENTS
Patient admitted with vertigo and found to have multiple brain lesions, abnormal CT chest w/lung nodules, s/p waldemar bronch yesterday, likely malignancy.  No fevers/chills/cough/wheezing/hemoptysis this AM.  Stable from respiratory standpoint.  Await biopsy results.  Vertigo slightly improved with steroids.

## 2019-08-30 NOTE — PHYSICAL THERAPY INITIAL EVALUATION ADULT - PLANNED THERAPY INTERVENTIONS, PT EVAL
balance training/bed mobility training/transfer training/Patient left supine in bed in NAD, call bell in reach, all lines intact. family at bedside/strengthening/gait training

## 2019-08-30 NOTE — PROGRESS NOTE ADULT - PROBLEM SELECTOR PLAN 5
pt reported overnight on 8/28-29 lower left leg pain radiating up and down similar to pain previously reported s/p fall with avulsion fracture per pt  - XR left ankle and XR left knee pt reported overnight on 8/28-29 lower left leg pain radiating up and down similar to pain previously reported s/p fall with avulsion fracture per pt  - XR left ankle and XR left knee w/o fx/dislocation/lytic or blastic lesions

## 2019-08-30 NOTE — PROGRESS NOTE ADULT - ASSESSMENT
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  S/p bronchoscopy for tissue biopsy.  Onc c/s. 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  S/p bronchoscopy, pending tissue biopsy.  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  S/p bronchoscopy, pending tissue biopsy results.  Onc c/s, recs appreciated

## 2019-08-30 NOTE — PROGRESS NOTE ADULT - PROBLEM SELECTOR PLAN 1
multiple lesions seen on CTH noncon and redemonstrated on MR Brain most likely representing metastasis disease from suspected primary lung CA  - vision change likely 2/2 to occipital lesions  - ataxia likely 2/2 to cerebellar lesions  - neurosurgery c/s, recs reviewed and appreciated  - c/w previous dose of dexamethasone 1.5 mg TID; leukocytosis w/ bands likely 2/2 to steroid use  - c/w levetiracetam 500 mg BID for seizure ppx  - oncology c/s, recs appreciated  - fall risk precautions  - PT c/s on pt with worsening ataxia 2/2 to brain metastases multiple lesions seen on CTH noncon and redemonstrated on MR Brain most likely representing metastasis disease from suspected primary lung CA  - vision change likely 2/2 to occipital lesions  - ataxia likely 2/2 to cerebellar lesions  - neurosurgery c/s, recs reviewed and appreciated  - c/w previous dose of dexamethasone 1.5 mg TID; leukocytosis w/ bands likely 2/2 to steroid use  - c/w levetiracetam 500 mg BID for seizure ppx  - oncology c/s, recs appreciated  - fall risk precautions  - PT c/s on pt with worsening ataxia 2/2 to brain metastases  - meclizine for dizziness multiple lesions seen on CTH noncon and redemonstrated on MR Brain most likely representing metastasis disease from suspected primary lung CA  - vision change likely 2/2 to occipital lesions  - ataxia likely 2/2 to cerebellar lesions  - neurosurgery c/s, recs reviewed and appreciated  - c/w previous dose of dexamethasone 1.5 mg TID; leukocytosis w/ bands likely 2/2 to steroid use  - c/w levetiracetam 500 mg BID for seizure ppx  - oncology c/s, recs appreciated  - fall risk precautions  - PT c/s on pt with worsening ataxia 2/2 to brain metastases  - meclizine for dizziness  - rad/onc eval re: RT once primary malignancy confirmed

## 2019-08-30 NOTE — PROGRESS NOTE ADULT - PROBLEM SELECTOR PLAN 2
1 year hx of vertigo and ataxia worsening in the last month s/p fall with orthopedic injury  - likely 2/2 to metastatic disease to the cerebellum demonstrated on CTH and MR brain  - fall risk precautions  - PT c/s, recs appreciated

## 2019-08-30 NOTE — PROGRESS NOTE ADULT - PROBLEM SELECTOR PLAN 4
possible metastatic dx in the setting of findings above in subcentimeter hypodense region seen on CT abdomen  - s/p abdominal U/S possible metastatic dx in the setting of findings above in subcentimeter hypodense region seen on CT abdomen  - s/p abdominal U/S (wnl)

## 2019-08-30 NOTE — PROGRESS NOTE ADULT - PROBLEM SELECTOR PLAN 1
S/P navigation bronchoscopy yesterday. No respiratory complaints  - f/u biopsy results   - Duonebs PRN given CT suggestive of emphysema    Larry So MD  Pulmonary & Critical Care Fellow  [phone]

## 2019-08-30 NOTE — CHART NOTE - NSCHARTNOTEFT_GEN_A_CORE
Imaging and case reviewed with attending neurosurgeon.  No acute neurosurgical intervention at this time.  Consult radiation oncology. Care per primary team.  Please reconsult as needed.

## 2019-08-30 NOTE — PROGRESS NOTE ADULT - SUBJECTIVE AND OBJECTIVE BOX
CHIEF COMPLAINT:    Interval Events: No acute events overnight. No respiratory complaints. Continued complaints of vertigo, endorses that it had been improved by meclizine in past.    REVIEW OF SYSTEMS:  Constitutional: [ ] negative [ ] fevers [ ] chills [ ] weight loss [ ] weight gain  HEENT: [ ] negative [ ] dry eyes [ ] eye irritation [ ] postnasal drip [ ] nasal congestion  CV: [ ] negative  [ ] chest pain [ ] orthopnea [ ] palpitations [ ] murmur  Resp: [ ] negative [ ] cough [ ] shortness of breath [ ] dyspnea [ ] wheezing [ ] sputum [ ] hemoptysis  GI: [ ] negative [ ] nausea [ ] vomiting [ ] diarrhea [ ] constipation [ ] abd pain [ ] dysphagia   : [ ] negative [ ] dysuria [ ] nocturia [ ] hematuria [ ] increased urinary frequency  Musculoskeletal: [ ] negative [ ] back pain [ ] myalgias [ ] arthralgias [ ] fracture  Skin: [ ] negative [ ] rash [ ] itch  Neurological: [ ] negative [ ] headache [X] dizziness [ ] syncope [ ] weakness [ ] numbness  Psychiatric: [ ] negative [ ] anxiety [ ] depression  Endocrine: [ ] negative [ ] diabetes [ ] thyroid problem  Hematologic/Lymphatic: [ ] negative [ ] anemia [ ] bleeding problem  Allergic/Immunologic: [ ] negative [ ] itchy eyes [ ] nasal discharge [ ] hives [ ] angioedema  [X] All other systems negative  [ ] Unable to assess ROS because ________    OBJECTIVE:  ICU Vital Signs Last 24 Hrs  T(C): 36.7 (30 Aug 2019 05:29), Max: 36.7 (29 Aug 2019 19:57)  T(F): 98 (30 Aug 2019 05:29), Max: 98.1 (29 Aug 2019 19:57)  HR: 66 (30 Aug 2019 05:29) (66 - 81)  BP: 105/64 (30 Aug 2019 05:29) (105/64 - 136/74)  BP(mean): 83 (29 Aug 2019 18:15) (75 - 94)  ABP: --  ABP(mean): --  RR: 16 (30 Aug 2019 05:29) (12 - 17)  SpO2: 98% (30 Aug 2019 05:29) (93% - 100%)        08-29 @ 07:01  -  08-30 @ 07:00  --------------------------------------------------------  IN: 400 mL / OUT: 5 mL / NET: 395 mL      CAPILLARY BLOOD GLUCOSE          PHYSICAL EXAM:  General: NAD, resting comfortably  HEENT: EOMI, PERRLA  Respiratory: CTAB  Cardiovascular: S1S2, RRR, no murmurs  Abdomen: Soft, NTND, BS+  Extremities: No peripheral edema    HOSPITAL MEDICATIONS:  MEDICATIONS  (STANDING):  dexamethasone     Tablet 1.5 milliGRAM(s) Oral three times a day  docusate sodium 100 milliGRAM(s) Oral three times a day  enoxaparin Injectable 40 milliGRAM(s) SubCutaneous at bedtime  levETIRAcetam 500 milliGRAM(s) Oral two times a day  meclizine 25 milliGRAM(s) Oral two times a day  QUEtiapine 25 milliGRAM(s) Oral at bedtime    MEDICATIONS  (PRN):  acetaminophen   Tablet .. 650 milliGRAM(s) Oral every 6 hours PRN Temp greater or equal to 38C (100.4F), Mild Pain (1 - 3), Moderate Pain (4 - 6)  ibuprofen  Tablet. 200 milliGRAM(s) Oral every 6 hours PRN Mild Pain (1 - 3)  ibuprofen  Tablet. 600 milliGRAM(s) Oral three times a day PRN Moderate Pain (4 - 6)  LORazepam     Tablet 0.5 milliGRAM(s) Oral once PRN Anxiety  ondansetron Injectable 4 milliGRAM(s) IV Push every 6 hours PRN Nausea      LABS:                        13.2   17.74 )-----------( 278      ( 30 Aug 2019 06:50 )             40.1     Hgb Trend: 13.2<--, 13.9<--, 13.0<--, 13.3<--  08-30    131<L>  |  93<L>  |  24<H>  ----------------------------<  103<H>  4.5   |  24  |  0.79    Ca    8.7      30 Aug 2019 06:50  Phos  4.0     08-30  Mg     2.5     08-30    TPro  6.5  /  Alb  3.7  /  TBili  0.4  /  DBili  x   /  AST  20  /  ALT  143<H>  /  AlkPhos  103  08-30    Creatinine Trend: 0.79<--, 0.81<--, 0.80<--, 0.88<--  PT/INR - ( 28 Aug 2019 19:58 )   PT: 10.4 SEC;   INR: 0.91          PTT - ( 28 Aug 2019 19:58 )  PTT:26.1 SEC          MICROBIOLOGY:       RADIOLOGY:  [ ] Reviewed and interpreted by me    PULMONARY FUNCTION TESTS:    EKG: CHIEF COMPLAINT: ataxia    Interval Events: No acute events overnight. No respiratory complaints. Continued complaints of vertigo, endorses that it had been improved by meclizine in past.    REVIEW OF SYSTEMS:  Constitutional: [ ] negative [ ] fevers [ ] chills [ ] weight loss [ ] weight gain  HEENT: [ ] negative [ ] dry eyes [ ] eye irritation [ ] postnasal drip [ ] nasal congestion  CV: [ ] negative  [ ] chest pain [ ] orthopnea [ ] palpitations [ ] murmur  Resp: [ ] negative [ ] cough [ ] shortness of breath [ ] dyspnea [ ] wheezing [ ] sputum [ ] hemoptysis  GI: [ ] negative [ ] nausea [ ] vomiting [ ] diarrhea [ ] constipation [ ] abd pain [ ] dysphagia   : [ ] negative [ ] dysuria [ ] nocturia [ ] hematuria [ ] increased urinary frequency  Musculoskeletal: [ ] negative [ ] back pain [ ] myalgias [ ] arthralgias [ ] fracture  Skin: [ ] negative [ ] rash [ ] itch  Neurological: [ ] negative [ ] headache [X] dizziness [ ] syncope [ ] weakness [ ] numbness  Psychiatric: [ ] negative [ ] anxiety [ ] depression  Endocrine: [ ] negative [ ] diabetes [ ] thyroid problem  Hematologic/Lymphatic: [ ] negative [ ] anemia [ ] bleeding problem  Allergic/Immunologic: [ ] negative [ ] itchy eyes [ ] nasal discharge [ ] hives [ ] angioedema  [X] All other systems negative  [ ] Unable to assess ROS because ________    OBJECTIVE:  ICU Vital Signs Last 24 Hrs  T(C): 36.7 (30 Aug 2019 05:29), Max: 36.7 (29 Aug 2019 19:57)  T(F): 98 (30 Aug 2019 05:29), Max: 98.1 (29 Aug 2019 19:57)  HR: 66 (30 Aug 2019 05:29) (66 - 81)  BP: 105/64 (30 Aug 2019 05:29) (105/64 - 136/74)  BP(mean): 83 (29 Aug 2019 18:15) (75 - 94)  ABP: --  ABP(mean): --  RR: 16 (30 Aug 2019 05:29) (12 - 17)  SpO2: 98% (30 Aug 2019 05:29) (93% - 100%)        08-29 @ 07:01  -  08-30 @ 07:00  --------------------------------------------------------  IN: 400 mL / OUT: 5 mL / NET: 395 mL      CAPILLARY BLOOD GLUCOSE          PHYSICAL EXAM:  General: NAD, resting comfortably  HEENT: EOMI, PERRLA  Respiratory: CTAB  Cardiovascular: S1S2, RRR, no murmurs  Abdomen: Soft, NTND, BS+  Extremities: No peripheral edema    HOSPITAL MEDICATIONS:  MEDICATIONS  (STANDING):  dexamethasone     Tablet 1.5 milliGRAM(s) Oral three times a day  docusate sodium 100 milliGRAM(s) Oral three times a day  enoxaparin Injectable 40 milliGRAM(s) SubCutaneous at bedtime  levETIRAcetam 500 milliGRAM(s) Oral two times a day  meclizine 25 milliGRAM(s) Oral two times a day  QUEtiapine 25 milliGRAM(s) Oral at bedtime    MEDICATIONS  (PRN):  acetaminophen   Tablet .. 650 milliGRAM(s) Oral every 6 hours PRN Temp greater or equal to 38C (100.4F), Mild Pain (1 - 3), Moderate Pain (4 - 6)  ibuprofen  Tablet. 200 milliGRAM(s) Oral every 6 hours PRN Mild Pain (1 - 3)  ibuprofen  Tablet. 600 milliGRAM(s) Oral three times a day PRN Moderate Pain (4 - 6)  LORazepam     Tablet 0.5 milliGRAM(s) Oral once PRN Anxiety  ondansetron Injectable 4 milliGRAM(s) IV Push every 6 hours PRN Nausea      LABS:                        13.2   17.74 )-----------( 278      ( 30 Aug 2019 06:50 )             40.1     Hgb Trend: 13.2<--, 13.9<--, 13.0<--, 13.3<--  08-30    131<L>  |  93<L>  |  24<H>  ----------------------------<  103<H>  4.5   |  24  |  0.79    Ca    8.7      30 Aug 2019 06:50  Phos  4.0     08-30  Mg     2.5     08-30    TPro  6.5  /  Alb  3.7  /  TBili  0.4  /  DBili  x   /  AST  20  /  ALT  143<H>  /  AlkPhos  103  08-30    Creatinine Trend: 0.79<--, 0.81<--, 0.80<--, 0.88<--  PT/INR - ( 28 Aug 2019 19:58 )   PT: 10.4 SEC;   INR: 0.91          PTT - ( 28 Aug 2019 19:58 )  PTT:26.1 SEC          MICROBIOLOGY:       RADIOLOGY:  [ ] Reviewed and interpreted by me    PULMONARY FUNCTION TESTS:    EKG:

## 2019-08-31 LAB
ALBUMIN SERPL ELPH-MCNC: 3.9 G/DL — SIGNIFICANT CHANGE UP (ref 3.3–5)
ALP SERPL-CCNC: 103 U/L — SIGNIFICANT CHANGE UP (ref 40–120)
ALT FLD-CCNC: 111 U/L — HIGH (ref 4–41)
ANION GAP SERPL CALC-SCNC: 12 MMO/L — SIGNIFICANT CHANGE UP (ref 7–14)
AST SERPL-CCNC: 12 U/L — SIGNIFICANT CHANGE UP (ref 4–40)
BILIRUB SERPL-MCNC: 0.5 MG/DL — SIGNIFICANT CHANGE UP (ref 0.2–1.2)
BUN SERPL-MCNC: 24 MG/DL — HIGH (ref 7–23)
CALCIUM SERPL-MCNC: 9.1 MG/DL — SIGNIFICANT CHANGE UP (ref 8.4–10.5)
CHLORIDE SERPL-SCNC: 93 MMOL/L — LOW (ref 98–107)
CO2 SERPL-SCNC: 25 MMOL/L — SIGNIFICANT CHANGE UP (ref 22–31)
CREAT SERPL-MCNC: 0.84 MG/DL — SIGNIFICANT CHANGE UP (ref 0.5–1.3)
GLUCOSE SERPL-MCNC: 100 MG/DL — HIGH (ref 70–99)
HCT VFR BLD CALC: 40.4 % — SIGNIFICANT CHANGE UP (ref 39–50)
HGB BLD-MCNC: 13.4 G/DL — SIGNIFICANT CHANGE UP (ref 13–17)
MAGNESIUM SERPL-MCNC: 2.3 MG/DL — SIGNIFICANT CHANGE UP (ref 1.6–2.6)
MCHC RBC-ENTMCNC: 28.9 PG — SIGNIFICANT CHANGE UP (ref 27–34)
MCHC RBC-ENTMCNC: 33.2 % — SIGNIFICANT CHANGE UP (ref 32–36)
MCV RBC AUTO: 87.1 FL — SIGNIFICANT CHANGE UP (ref 80–100)
NRBC # FLD: 0 K/UL — SIGNIFICANT CHANGE UP (ref 0–0)
PHOSPHATE SERPL-MCNC: 3.8 MG/DL — SIGNIFICANT CHANGE UP (ref 2.5–4.5)
PLATELET # BLD AUTO: 253 K/UL — SIGNIFICANT CHANGE UP (ref 150–400)
PMV BLD: 10.7 FL — SIGNIFICANT CHANGE UP (ref 7–13)
POTASSIUM SERPL-MCNC: 4.2 MMOL/L — SIGNIFICANT CHANGE UP (ref 3.5–5.3)
POTASSIUM SERPL-SCNC: 4.2 MMOL/L — SIGNIFICANT CHANGE UP (ref 3.5–5.3)
PROT SERPL-MCNC: 7 G/DL — SIGNIFICANT CHANGE UP (ref 6–8.3)
RBC # BLD: 4.64 M/UL — SIGNIFICANT CHANGE UP (ref 4.2–5.8)
RBC # FLD: 13.2 % — SIGNIFICANT CHANGE UP (ref 10.3–14.5)
SODIUM SERPL-SCNC: 130 MMOL/L — LOW (ref 135–145)
WBC # BLD: 14.23 K/UL — HIGH (ref 3.8–10.5)
WBC # FLD AUTO: 14.23 K/UL — HIGH (ref 3.8–10.5)

## 2019-08-31 PROCEDURE — 99233 SBSQ HOSP IP/OBS HIGH 50: CPT | Mod: GC

## 2019-08-31 RX ORDER — POLYETHYLENE GLYCOL 3350 17 G/17G
17 POWDER, FOR SOLUTION ORAL
Refills: 0 | Status: DISCONTINUED | OUTPATIENT
Start: 2019-08-31 | End: 2019-09-06

## 2019-08-31 RX ORDER — LANOLIN ALCOHOL/MO/W.PET/CERES
3 CREAM (GRAM) TOPICAL AT BEDTIME
Refills: 0 | Status: DISCONTINUED | OUTPATIENT
Start: 2019-08-31 | End: 2019-09-06

## 2019-08-31 RX ADMIN — ONDANSETRON 4 MILLIGRAM(S): 8 TABLET, FILM COATED ORAL at 03:48

## 2019-08-31 RX ADMIN — Medication 25 MILLIGRAM(S): at 17:05

## 2019-08-31 RX ADMIN — Medication 1.5 MILLIGRAM(S): at 07:10

## 2019-08-31 RX ADMIN — Medication 100 MILLIGRAM(S): at 13:00

## 2019-08-31 RX ADMIN — ENOXAPARIN SODIUM 40 MILLIGRAM(S): 100 INJECTION SUBCUTANEOUS at 21:02

## 2019-08-31 RX ADMIN — POLYETHYLENE GLYCOL 3350 17 GRAM(S): 17 POWDER, FOR SOLUTION ORAL at 17:05

## 2019-08-31 RX ADMIN — Medication 650 MILLIGRAM(S): at 08:33

## 2019-08-31 RX ADMIN — Medication 650 MILLIGRAM(S): at 19:00

## 2019-08-31 RX ADMIN — LEVETIRACETAM 500 MILLIGRAM(S): 250 TABLET, FILM COATED ORAL at 07:10

## 2019-08-31 RX ADMIN — QUETIAPINE FUMARATE 25 MILLIGRAM(S): 200 TABLET, FILM COATED ORAL at 21:02

## 2019-08-31 RX ADMIN — Medication 25 MILLIGRAM(S): at 07:10

## 2019-08-31 RX ADMIN — Medication 100 MILLIGRAM(S): at 07:10

## 2019-08-31 RX ADMIN — ONDANSETRON 4 MILLIGRAM(S): 8 TABLET, FILM COATED ORAL at 23:36

## 2019-08-31 RX ADMIN — ONDANSETRON 4 MILLIGRAM(S): 8 TABLET, FILM COATED ORAL at 11:00

## 2019-08-31 RX ADMIN — LEVETIRACETAM 500 MILLIGRAM(S): 250 TABLET, FILM COATED ORAL at 17:05

## 2019-08-31 RX ADMIN — Medication 100 MILLIGRAM(S): at 21:02

## 2019-08-31 RX ADMIN — Medication 650 MILLIGRAM(S): at 09:30

## 2019-08-31 RX ADMIN — ONDANSETRON 4 MILLIGRAM(S): 8 TABLET, FILM COATED ORAL at 17:05

## 2019-08-31 RX ADMIN — Medication 1.5 MILLIGRAM(S): at 21:02

## 2019-08-31 RX ADMIN — Medication 3 MILLIGRAM(S): at 21:02

## 2019-08-31 RX ADMIN — Medication 650 MILLIGRAM(S): at 18:20

## 2019-08-31 RX ADMIN — Medication 1.5 MILLIGRAM(S): at 13:00

## 2019-08-31 NOTE — PROGRESS NOTE ADULT - PROBLEM SELECTOR PLAN 1
multiple lesions seen on CTH noncon and redemonstrated on MR Brain most likely representing metastasis disease from suspected primary lung CA  - vision change likely 2/2 to occipital lesions  - ataxia likely 2/2 to cerebellar lesions  - neurosurgery c/s, recs reviewed and appreciated  - c/w previous dose of dexamethasone 1.5 mg TID; leukocytosis w/ bands likely 2/2 to steroid use  - c/w levetiracetam 500 mg BID for seizure ppx  - oncology c/s, recs appreciated  - fall risk precautions  - PT c/s on pt with worsening ataxia 2/2 to brain metastases  - meclizine for dizziness  - rad/onc eval re: RT once primary malignancy confirmed

## 2019-08-31 NOTE — PROGRESS NOTE ADULT - PROBLEM SELECTOR PLAN 4
possible metastatic dx in the setting of findings above in subcentimeter hypodense region seen on CT abdomen  - s/p abdominal U/S (wnl)

## 2019-08-31 NOTE — PROGRESS NOTE ADULT - SUBJECTIVE AND OBJECTIVE BOX
Patient is a 68y old  Male who presents with a chief complaint of Ataxia (30 Aug 2019 13:53)      SUBJECTIVE / OVERNIGHT EVENTS: Patient seen and examined at bedside. Vital signs stable overnight. Family at bedside, pt has concerns of blurry vision and headache. Also with poor appetite.      MEDICATIONS  (STANDING):  dexamethasone     Tablet 1.5 milliGRAM(s) Oral three times a day  docusate sodium 100 milliGRAM(s) Oral three times a day  enoxaparin Injectable 40 milliGRAM(s) SubCutaneous at bedtime  levETIRAcetam 500 milliGRAM(s) Oral two times a day  meclizine 25 milliGRAM(s) Oral two times a day  QUEtiapine 25 milliGRAM(s) Oral at bedtime    MEDICATIONS  (PRN):  acetaminophen   Tablet .. 650 milliGRAM(s) Oral every 6 hours PRN Temp greater or equal to 38C (100.4F), Mild Pain (1 - 3), Moderate Pain (4 - 6)  ibuprofen  Tablet. 200 milliGRAM(s) Oral every 6 hours PRN Mild Pain (1 - 3)  ibuprofen  Tablet. 600 milliGRAM(s) Oral three times a day PRN Moderate Pain (4 - 6)  LORazepam     Tablet 0.5 milliGRAM(s) Oral once PRN Anxiety  ondansetron Injectable 4 milliGRAM(s) IV Push every 6 hours PRN Nausea      T(C): 36.5 (08-31-19 @ 05:57), Max: 36.8 (08-30-19 @ 21:11)  HR: 61 (08-31-19 @ 05:57) (61 - 63)  BP: 107/56 (08-31-19 @ 05:57) (107/56 - 124/68)  RR: 17 (08-31-19 @ 05:57) (16 - 18)  SpO2: 98% (08-31-19 @ 05:57) (97% - 98%)      PHYSICAL EXAM:  GEN: NAD  HEENT: PERRL, EOMI; mucous moist; neck supple without LAD.   CV: RRR, S1, S2; no M/R/G; good capillary refill  PULM: LCTAB; not using accessory muscles  ABD: normal bowel sounds; non-distended, soft; non-tender; no rebound, no guarding  Extremities: no clubbing, cyanosis; no edema; DP and radial pulses palpable  NEURO: AAOx3; no FND. EOMI    LABS:  (08-31 @ 05:55)                        13.4  14.23 )-----------( 253                 40.4    Neutrophils = -- (--%)  Lymphocytes = -- (--%)  Eosinophils = -- (--%)  Basophils = -- (--%)  Monocytes = -- (--%)  Bands = --%    WBC Trend: 14.23<--, 17.74<--, 20.88<--  Hb Trend: 13.4<--, 13.2<--, 13.9<--, 13.0<--, 13.3<--  Plt Trend: 253<--, 278<--, 292<--, 287<--, 311<--  08-31    130<L>  |  93<L>  |  24<H>  ----------------------------<  100<H>  4.2   |  25  |  0.84    Ca    9.1      31 Aug 2019 05:55  Phos  3.8     08-31  Mg     2.3     08-31    TPro  7.0  /  Alb  3.9  /  TBili  0.5  /  DBili  x   /  AST  12  /  ALT  111<H>  /  AlkPhos  103  08-31    Creatinine Trend: 0.84<--, 0.79<--, 0.81<--, 0.80<--, 0.88<--        Microbiology:  Reviewed     RADIOLOGY & ADDITIONAL TESTS:  CT: < from: CT Head No Cont (08.28.19 @ 12:01) >  IMPRESSION:    Multiple  lesions concerning for metastasis with hemorrhagic and/or   proteinaceous debris as detailed above, including left parietal occipital   mass with fluid fluid level with  edema and mass effect, MRI with   gadolinium will better assess. Basal cisterns are patent, no extra axial   collection or midline shift. Strongly suggest improved assessment using   MRI with gadolinium.    < end of copied text >      [x ] imaging personally reviewed and interpreted by me    Consultant(s) Notes Reviewed:  Neurosurgery, Pulmonary Patient is a 68y old  Male who presents with a chief complaint of Ataxia (30 Aug 2019 13:53)      SUBJECTIVE / OVERNIGHT EVENTS: Patient seen and examined at bedside. Vital signs stable overnight. Family at bedside, pt has concerns of blurry vision and headache. Also with poor appetite.      MEDICATIONS  (STANDING):  dexamethasone     Tablet 1.5 milliGRAM(s) Oral three times a day  docusate sodium 100 milliGRAM(s) Oral three times a day  enoxaparin Injectable 40 milliGRAM(s) SubCutaneous at bedtime  levETIRAcetam 500 milliGRAM(s) Oral two times a day  meclizine 25 milliGRAM(s) Oral two times a day  QUEtiapine 25 milliGRAM(s) Oral at bedtime    MEDICATIONS  (PRN):  acetaminophen   Tablet .. 650 milliGRAM(s) Oral every 6 hours PRN Temp greater or equal to 38C (100.4F), Mild Pain (1 - 3), Moderate Pain (4 - 6)  ibuprofen  Tablet. 200 milliGRAM(s) Oral every 6 hours PRN Mild Pain (1 - 3)  ibuprofen  Tablet. 600 milliGRAM(s) Oral three times a day PRN Moderate Pain (4 - 6)  LORazepam     Tablet 0.5 milliGRAM(s) Oral once PRN Anxiety  ondansetron Injectable 4 milliGRAM(s) IV Push every 6 hours PRN Nausea      T(C): 36.5 (08-31-19 @ 05:57), Max: 36.8 (08-30-19 @ 21:11)  HR: 61 (08-31-19 @ 05:57) (61 - 63)  BP: 107/56 (08-31-19 @ 05:57) (107/56 - 124/68)  RR: 17 (08-31-19 @ 05:57) (16 - 18)  SpO2: 98% (08-31-19 @ 05:57) (97% - 98%)      PHYSICAL EXAM:  GEN: NAD  HEENT: PERRL, EOMI; mucous moist; neck supple without LAD.   CV: RRR, S1, S2; no M/R/G; good capillary refill  PULM: LCTAB; not using accessory muscles  ABD: normal bowel sounds; non-distended, soft; non-tender; no rebound, no guarding  Extremities: no clubbing, cyanosis; no edema; DP and radial pulses palpable  NEURO: AAOx3; no FND. EOMI    LABS:  (08-31 @ 05:55)                        13.4  14.23 )-----------( 253                 40.4    Neutrophils = -- (--%)  Lymphocytes = -- (--%)  Eosinophils = -- (--%)  Basophils = -- (--%)  Monocytes = -- (--%)  Bands = --%    WBC Trend: 14.23<--, 17.74<--, 20.88<--  Hb Trend: 13.4<--, 13.2<--, 13.9<--, 13.0<--, 13.3<--  Plt Trend: 253<--, 278<--, 292<--, 287<--, 311<--  08-31    130<L>  |  93<L>  |  24<H>  ----------------------------<  100<H>  4.2   |  25  |  0.84    Ca    9.1      31 Aug 2019 05:55  Phos  3.8     08-31  Mg     2.3     08-31    TPro  7.0  /  Alb  3.9  /  TBili  0.5  /  DBili  x   /  AST  12  /  ALT  111<H>  /  AlkPhos  103  08-31    Creatinine Trend: 0.84<--, 0.79<--, 0.81<--, 0.80<--, 0.88<--        Microbiology:  Reviewed     RADIOLOGY & ADDITIONAL TESTS:  CT: < from: CT Head No Cont (08.28.19 @ 12:01) >  IMPRESSION:    Multiple  lesions concerning for metastasis with hemorrhagic and/or   proteinaceous debris as detailed above, including left parietal occipital   mass with fluid fluid level with  edema and mass effect, MRI with   gadolinium will better assess. Basal cisterns are patent, no extra axial   collection or midline shift. Strongly suggest improved assessment using   MRI with gadolinium.    < end of copied text >      Consultant(s) Notes Reviewed:  Neurosurgery, Pulmonary

## 2019-08-31 NOTE — PROGRESS NOTE ADULT - ASSESSMENT
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  S/p bronchoscopy, pending tissue biopsy results.  Onc c/s, recs appreciated

## 2019-08-31 NOTE — PROGRESS NOTE ADULT - PROBLEM SELECTOR PLAN 3
primary lung CA in pt with 50 pack year smoking hx vs. metastatic dx from unknown primary  - pulmonology appreciated, s/p bronchoscopy w/ bx   - f/u biopsy results

## 2019-08-31 NOTE — PROGRESS NOTE ADULT - PROBLEM SELECTOR PLAN 5
pt reported overnight on 8/28-29 lower left leg pain radiating up and down similar to pain previously reported s/p fall with avulsion fracture per pt  - XR left ankle and XR left knee w/o fx/dislocation/lytic or blastic lesions 24.1

## 2019-08-31 NOTE — PROGRESS NOTE ADULT - PROBLEM SELECTOR PLAN 6
diet: regular  VTE: SCDs in the setting of possible hemorrhage from brain lesion    Discussed with Dr. Vargas

## 2019-08-31 NOTE — PROGRESS NOTE ADULT - ATTENDING COMMENTS
Patient seen and examined, d/w Dr. Isbell, agree with above.     Complaining of constipation today (still having flatus) and some nausea. C/w bowel regimen and zofran prn. Having trouble sleeping, will try melatonin. Given complaints of poor PO intake, will obtain nutrition consult. Awaiting results of biopsy to determine oncological plan, to followup rad/onc in coming week to discuss potential for radiation therapy.

## 2019-09-01 LAB
ALBUMIN SERPL ELPH-MCNC: 3.8 G/DL — SIGNIFICANT CHANGE UP (ref 3.3–5)
ALP SERPL-CCNC: 100 U/L — SIGNIFICANT CHANGE UP (ref 40–120)
ALT FLD-CCNC: 82 U/L — HIGH (ref 4–41)
ANION GAP SERPL CALC-SCNC: 17 MMO/L — HIGH (ref 7–14)
AST SERPL-CCNC: 16 U/L — SIGNIFICANT CHANGE UP (ref 4–40)
BILIRUB SERPL-MCNC: 0.6 MG/DL — SIGNIFICANT CHANGE UP (ref 0.2–1.2)
BUN SERPL-MCNC: 24 MG/DL — HIGH (ref 7–23)
CALCIUM SERPL-MCNC: 9 MG/DL — SIGNIFICANT CHANGE UP (ref 8.4–10.5)
CHLORIDE SERPL-SCNC: 88 MMOL/L — LOW (ref 98–107)
CO2 SERPL-SCNC: 23 MMOL/L — SIGNIFICANT CHANGE UP (ref 22–31)
CREAT SERPL-MCNC: 0.84 MG/DL — SIGNIFICANT CHANGE UP (ref 0.5–1.3)
GLUCOSE SERPL-MCNC: 100 MG/DL — HIGH (ref 70–99)
HCT VFR BLD CALC: 42.3 % — SIGNIFICANT CHANGE UP (ref 39–50)
HGB BLD-MCNC: 14.1 G/DL — SIGNIFICANT CHANGE UP (ref 13–17)
MAGNESIUM SERPL-MCNC: 2.3 MG/DL — SIGNIFICANT CHANGE UP (ref 1.6–2.6)
MCHC RBC-ENTMCNC: 28.4 PG — SIGNIFICANT CHANGE UP (ref 27–34)
MCHC RBC-ENTMCNC: 33.3 % — SIGNIFICANT CHANGE UP (ref 32–36)
MCV RBC AUTO: 85.3 FL — SIGNIFICANT CHANGE UP (ref 80–100)
NRBC # FLD: 0 K/UL — SIGNIFICANT CHANGE UP (ref 0–0)
PHOSPHATE SERPL-MCNC: 3.6 MG/DL — SIGNIFICANT CHANGE UP (ref 2.5–4.5)
PLATELET # BLD AUTO: 232 K/UL — SIGNIFICANT CHANGE UP (ref 150–400)
PMV BLD: 10.5 FL — SIGNIFICANT CHANGE UP (ref 7–13)
POTASSIUM SERPL-MCNC: 4.2 MMOL/L — SIGNIFICANT CHANGE UP (ref 3.5–5.3)
POTASSIUM SERPL-SCNC: 4.2 MMOL/L — SIGNIFICANT CHANGE UP (ref 3.5–5.3)
PROT SERPL-MCNC: 6.8 G/DL — SIGNIFICANT CHANGE UP (ref 6–8.3)
RBC # BLD: 4.96 M/UL — SIGNIFICANT CHANGE UP (ref 4.2–5.8)
RBC # FLD: 12.8 % — SIGNIFICANT CHANGE UP (ref 10.3–14.5)
SODIUM SERPL-SCNC: 128 MMOL/L — LOW (ref 135–145)
WBC # BLD: 15.85 K/UL — HIGH (ref 3.8–10.5)
WBC # FLD AUTO: 15.85 K/UL — HIGH (ref 3.8–10.5)

## 2019-09-01 PROCEDURE — 99233 SBSQ HOSP IP/OBS HIGH 50: CPT | Mod: GC

## 2019-09-01 PROCEDURE — 93010 ELECTROCARDIOGRAM REPORT: CPT

## 2019-09-01 RX ORDER — SODIUM CHLORIDE 9 MG/ML
250 INJECTION INTRAMUSCULAR; INTRAVENOUS; SUBCUTANEOUS ONCE
Refills: 0 | Status: COMPLETED | OUTPATIENT
Start: 2019-09-01 | End: 2019-09-01

## 2019-09-01 RX ORDER — LEVETIRACETAM 250 MG/1
500 TABLET, FILM COATED ORAL EVERY 12 HOURS
Refills: 0 | Status: DISCONTINUED | OUTPATIENT
Start: 2019-09-01 | End: 2019-09-06

## 2019-09-01 RX ORDER — SODIUM CHLORIDE 9 MG/ML
1000 INJECTION INTRAMUSCULAR; INTRAVENOUS; SUBCUTANEOUS
Refills: 0 | Status: DISCONTINUED | OUTPATIENT
Start: 2019-09-01 | End: 2019-09-03

## 2019-09-01 RX ORDER — HALOPERIDOL DECANOATE 100 MG/ML
0.5 INJECTION INTRAMUSCULAR ONCE
Refills: 0 | Status: COMPLETED | OUTPATIENT
Start: 2019-09-01 | End: 2019-09-01

## 2019-09-01 RX ORDER — DEXAMETHASONE 0.5 MG/5ML
2 ELIXIR ORAL THREE TIMES A DAY
Refills: 0 | Status: DISCONTINUED | OUTPATIENT
Start: 2019-09-01 | End: 2019-09-03

## 2019-09-01 RX ADMIN — Medication 100 MILLIGRAM(S): at 13:38

## 2019-09-01 RX ADMIN — LEVETIRACETAM 400 MILLIGRAM(S): 250 TABLET, FILM COATED ORAL at 21:50

## 2019-09-01 RX ADMIN — Medication 1.5 MILLIGRAM(S): at 05:58

## 2019-09-01 RX ADMIN — Medication 25 MILLIGRAM(S): at 04:16

## 2019-09-01 RX ADMIN — Medication 3 MILLIGRAM(S): at 21:49

## 2019-09-01 RX ADMIN — HALOPERIDOL DECANOATE 0.5 MILLIGRAM(S): 100 INJECTION INTRAMUSCULAR at 21:50

## 2019-09-01 RX ADMIN — Medication 650 MILLIGRAM(S): at 21:49

## 2019-09-01 RX ADMIN — Medication 650 MILLIGRAM(S): at 22:45

## 2019-09-01 RX ADMIN — Medication 100 MILLIGRAM(S): at 05:58

## 2019-09-01 RX ADMIN — ENOXAPARIN SODIUM 40 MILLIGRAM(S): 100 INJECTION SUBCUTANEOUS at 21:50

## 2019-09-01 RX ADMIN — Medication 2 MILLIGRAM(S): at 13:38

## 2019-09-01 RX ADMIN — POLYETHYLENE GLYCOL 3350 17 GRAM(S): 17 POWDER, FOR SOLUTION ORAL at 05:58

## 2019-09-01 RX ADMIN — SODIUM CHLORIDE 250 MILLILITER(S): 9 INJECTION INTRAMUSCULAR; INTRAVENOUS; SUBCUTANEOUS at 04:16

## 2019-09-01 RX ADMIN — LEVETIRACETAM 500 MILLIGRAM(S): 250 TABLET, FILM COATED ORAL at 05:58

## 2019-09-01 RX ADMIN — QUETIAPINE FUMARATE 25 MILLIGRAM(S): 200 TABLET, FILM COATED ORAL at 21:49

## 2019-09-01 RX ADMIN — ONDANSETRON 4 MILLIGRAM(S): 8 TABLET, FILM COATED ORAL at 20:20

## 2019-09-01 RX ADMIN — SODIUM CHLORIDE 75 MILLILITER(S): 9 INJECTION INTRAMUSCULAR; INTRAVENOUS; SUBCUTANEOUS at 13:38

## 2019-09-01 RX ADMIN — Medication 2 MILLIGRAM(S): at 21:49

## 2019-09-01 NOTE — PROGRESS NOTE ADULT - SUBJECTIVE AND OBJECTIVE BOX
Magan Potter M.D.  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 s/p bronch pending bx results    Interval Hx / subjective: wife, sister in law, daughter at bedside at various time this morning; pt c/o of worsening vertigo/dizziness now at rest with N/V not relieved by zofran or meclizine; dizziness now triggered by movement of extraocular muscles and rapid alternating movements    ROS:  CON: dizziness, lightheadedness; NO fever, chills  HEENT: continued change to vision; denied changes to smell, hearing, taste; denied throat pain, chest pain  CV: denied chest pain, racing heart, skipped beats  RESP: denied SOB, cough, wheezing  GI: denied nausea, vomiting, heartburn; diarrhea, constipation, dark or bloody stool  : denied flank pain; pain or burning with urination; bloody urine  MSK: left lower leg pain, no swelling; denied muscle ache  ENDO: denied heat or cold intolerance  NEURO: endorsed trouble finding words; denied headache, slurred speech; tingling, numbness  SKIN: denied itching, rash, other skin changes    MEDICATIONS  (STANDING):  dexamethasone     Tablet 1.5 milliGRAM(s) Oral three times a day  docusate sodium 100 milliGRAM(s) Oral three times a day  enoxaparin Injectable 40 milliGRAM(s) SubCutaneous at bedtime  levETIRAcetam 500 milliGRAM(s) Oral two times a day  QUEtiapine 25 milliGRAM(s) Oral at bedtime    MEDICATIONS  (STANDING):  dexamethasone     Tablet 1.5 milliGRAM(s) Oral three times a day  docusate sodium 100 milliGRAM(s) Oral three times a day  enoxaparin Injectable 40 milliGRAM(s) SubCutaneous at bedtime  levETIRAcetam 500 milliGRAM(s) Oral two times a day  meclizine 25 milliGRAM(s) Oral two times a day  QUEtiapine 25 milliGRAM(s) Oral at bedtime    MEDICATIONS  (PRN):  acetaminophen   Tablet .. 650 milliGRAM(s) Oral every 6 hours PRN Temp greater or equal to 38C (100.4F), Mild Pain (1 - 3), Moderate Pain (4 - 6)  ibuprofen  Tablet. 200 milliGRAM(s) Oral every 6 hours PRN Mild Pain (1 - 3)  ibuprofen  Tablet. 600 milliGRAM(s) Oral three times a day PRN Moderate Pain (4 - 6)  LORazepam     Tablet 0.5 milliGRAM(s) Oral once PRN Anxiety  ondansetron Injectable 4 milliGRAM(s) IV Push every 6 hours PRN Nausea    Objective:  Vital Signs Last 24 Hrs  T(C): 36.7 (30 Aug 2019 05:29), Max: 36.7 (29 Aug 2019 19:57)  T(F): 98 (30 Aug 2019 05:29), Max: 98.1 (29 Aug 2019 19:57)  HR: 66 (30 Aug 2019 05:29) (60 - 81)  BP: 105/64 (30 Aug 2019 05:29) (105/64 - 137/79)  BP(mean): 83 (29 Aug 2019 18:15) (75 - 94)  RR: 16 (30 Aug 2019 05:29) (12 - 18)  SpO2: 98% (30 Aug 2019 05:29) (93% - 100%)    Physical Exam:  GEN: NAD, less active this morning  HEENT: PERRL, EOMI; mucous moist; neck supple without LAD  CV: RRR, S1, S2; no M/R/G; good capillary refill  PULM: LCTAB; not using accessory muscles  ABD: normal bowel sounds; non-distended, soft; non-tender; no rebound, no guarding  Extremities: no clubbing, cyanosis; no edema; DP and radial pulses palpable  NEURO: AAOx3; no FND                        13.2   17.74 )-----------( 278      ( 30 Aug 2019 06:50 )             40.1   08-30    131<L>  |  93<L>  |  24<H>  ----------------------------<  103<H>  4.5   |  24  |  0.79    Ca    8.7      30 Aug 2019 06:50  Phos  4.0     08-30  Mg     2.5     08-30    TPro  6.5  /  Alb  3.7  /  TBili  0.4  /  DBili  x   /  AST  20  /  ALT  143<H>  /  AlkPhos  103  08-30      < from: MR Head w/wo IV Cont (08.28.19 @ 20:10) >  EXAM:  MR BRAIN WAW IC        PROCEDURE DATE:  Aug 28 2019         INTERPRETATION:  INDICATIONS:  Multiple brain lesions seen on CT   concerning for metastatic disease.    TECHNIQUE:  Multiplanar imaging was performed using T1 weighted, T2   weighted and FLAIR sequences.  Diffusion weighted and susceptibility   sensitive images were also obtained.  Following intravenous gadolinium,   multiplanar T1 weighted images were performed. 6 cc Gadavist were   administered. 1.5 cc were discarded.      COMPARISON EXAMINATION:  Brain CT 8/28/2019      FINDINGS:    VENTRICLES AND SULCI:  Prominent in size compatible with age-appropriate   volume loss.  INTRA-AXIAL:  Multiple enhancing masses are seen throughout the cerebral   and cerebellar hemispheres as seen on the prior CT scan. Several lesions   demonstrate susceptibility artifact with or without precontrast T1   hyperintensity and heterogeneous T2 signal compatible with associated   hemorrhage. Several lesions are associated with vasogenic edema.  A left occipital mass with significant edema measures 1.4 x 1.3 cm.   A mass within the vermis measures 2.3 x 1.4 cm.   A mass within the right cerebellar hemisphere measures 1.8 x 1.6 cm.   A mass within the left cerebellum involving the cerebellar tonsil   measures 1.8 x 1.8 cm and an adjacent mass within or projecting into the   fourth ventricle measures 1.5 x 1 cm effacing the fourth ventricular   lumen. This nodule significantly indents the adjacent medulla.    Scattered foci of white matter T2 hyperintensity are seen compatible with   mild to moderate microvascular type white matter changes.  EXTRA-AXIAL:  No mass or collection.  VISUALIZED SINUSES:  Normal.  VISUALIZED MASTOIDS:  Clear.  CALVARIUM:  Normal.  CAROTID FLOW VOIDS:  Present.  MISCELLANEOUS:  None.      IMPRESSION:    Multiple intraparenchymal enhancing masses several of which are   associated with hemorrhage and/or vasogenic edema. Metastatic disease is   a prime consideration.    2 adjacent lesions efface the fourth ventricular lumen however no   significant hydrocephalus is seen at this time.    < end of copied text >      BRONCHOSCOPY RESULTS PENDING    Xray ankle/knee personally reviewed: no fracture/dislocations, no lytic/blastic lesions    Consultant notes reviewed: Pulm, NSx Magan Potter M.D.  PGY1  [phone] / [id number]    68y M w/ unknown hx a/f metastatic dx to the brain from suspected lung primary s/p bronch pending bx results    Interval Hx / subjective: wife, sister in law, daughter at bedside at various time this morning; pt c/o of worsening vertigo/dizziness now at rest not relieved with meclizine and N/V not relieved by zofran; dizziness now triggered by movement of extraocular muscles and rapid alternating movements    ROS:  CON: dizziness, lightheadedness; NO fever, chills  HEENT: continued change to vision; denied changes to smell, hearing, taste; denied throat pain, chest pain  CV: denied chest pain, racing heart, skipped beats  RESP: denied SOB, cough, wheezing  GI: denied nausea, vomiting, heartburn; diarrhea, constipation, dark or bloody stool  : denied flank pain; pain or burning with urination; bloody urine  MSK: left lower leg pain, no swelling; denied muscle ache  ENDO: denied heat or cold intolerance  NEURO: endorsed trouble finding words; denied headache, slurred speech; tingling, numbness  SKIN: denied itching, rash, other skin changes    MEDICATIONS  (STANDING):  dexamethasone  Injectable 2 milliGRAM(s) IV Push three times a day  docusate sodium 100 milliGRAM(s) Oral three times a day  enoxaparin Injectable 40 milliGRAM(s) SubCutaneous at bedtime  levETIRAcetam 500 milliGRAM(s) Oral two times a day  meclizine 25 milliGRAM(s) Oral two times a day  polyethylene glycol 3350 17 Gram(s) Oral two times a day  QUEtiapine 25 milliGRAM(s) Oral at bedtime  sodium chloride 0.9%. 1000 milliLiter(s) (75 mL/Hr) IV Continuous <Continuous>    MEDICATIONS  (PRN):  acetaminophen   Tablet .. 650 milliGRAM(s) Oral every 6 hours PRN Temp greater or equal to 38C (100.4F), Mild Pain (1 - 3), Moderate Pain (4 - 6)  ibuprofen  Tablet. 200 milliGRAM(s) Oral every 6 hours PRN Mild Pain (1 - 3)  ibuprofen  Tablet. 600 milliGRAM(s) Oral three times a day PRN Moderate Pain (4 - 6)  LORazepam     Tablet 0.5 milliGRAM(s) Oral once PRN Anxiety  melatonin 3 milliGRAM(s) Oral at bedtime PRN Insomnia  ondansetron Injectable 4 milliGRAM(s) IV Push every 6 hours PRN Nausea    Objective:  Vital Signs Last 24 Hrs  T(C): 36.6 (01 Sep 2019 05:51), Max: 36.6 (31 Aug 2019 21:13)  T(F): 97.9 (01 Sep 2019 05:51), Max: 97.9 (01 Sep 2019 05:51)  HR: 63 (01 Sep 2019 05:51) (61 - 64)  BP: 122/64 (01 Sep 2019 05:51) (110/63 - 122/64)  BP(mean): --  RR: 18 (01 Sep 2019 05:51) (17 - 18)  SpO2: 98% (01 Sep 2019 05:51) (98% - 98%)    Physical Exam:  GEN: NAD, less active this morning  HEENT: PERRL, EOMI; mucous moist; neck supple without LAD  CV: RRR, S1, S2; no M/R/G; good capillary refill  PULM: LCTAB; not using accessory muscles  ABD: normal bowel sounds; non-distended, soft; non-tender; no rebound, no guarding  Extremities: no clubbing, cyanosis; no edema; DP and radial pulses palpable    NEURO:   CN:  decreased visual acuity  OEM: not done due to severe vertigo provoked by exam  sensation to the face intact bilat V1, V2, V3  decreased                  14.1   15.85 )-----------( 232      ( 09-01 @ 07:20 )             42.3     09-01    128<L>  |  88<L>  |  24<H>  ----------------------------<  100<H>  4.2   |  23  |  0.84    Ca    9.0      01 Sep 2019 07:20  Phos  3.6     09-01  Mg     2.3     09-01    TPro  6.8  /  Alb  3.8  /  TBili  0.6  /  DBili  x   /  AST  16  /  ALT  82<H>  /  AlkPhos  100  09-01      < from: MR Head w/wo IV Cont (08.28.19 @ 20:10) >  EXAM:  MR BRAIN WAW IC        PROCEDURE DATE:  Aug 28 2019         INTERPRETATION:  INDICATIONS:  Multiple brain lesions seen on CT   concerning for metastatic disease.    TECHNIQUE:  Multiplanar imaging was performed using T1 weighted, T2   weighted and FLAIR sequences.  Diffusion weighted and susceptibility   sensitive images were also obtained.  Following intravenous gadolinium,   multiplanar T1 weighted images were performed. 6 cc Gadavist were   administered. 1.5 cc were discarded.      COMPARISON EXAMINATION:  Brain CT 8/28/2019      FINDINGS:    VENTRICLES AND SULCI:  Prominent in size compatible with age-appropriate   volume loss.  INTRA-AXIAL:  Multiple enhancing masses are seen throughout the cerebral   and cerebellar hemispheres as seen on the prior CT scan. Several lesions   demonstrate susceptibility artifact with or without precontrast T1   hyperintensity and heterogeneous T2 signal compatible with associated   hemorrhage. Several lesions are associated with vasogenic edema.  A left occipital mass with significant edema measures 1.4 x 1.3 cm.   A mass within the vermis measures 2.3 x 1.4 cm.   A mass within the right cerebellar hemisphere measures 1.8 x 1.6 cm.   A mass within the left cerebellum involving the cerebellar tonsil   measures 1.8 x 1.8 cm and an adjacent mass within or projecting into the   fourth ventricle measures 1.5 x 1 cm effacing the fourth ventricular   lumen. This nodule significantly indents the adjacent medulla.    Scattered foci of white matter T2 hyperintensity are seen compatible with   mild to moderate microvascular type white matter changes.  EXTRA-AXIAL:  No mass or collection.  VISUALIZED SINUSES:  Normal.  VISUALIZED MASTOIDS:  Clear.  CALVARIUM:  Normal.  CAROTID FLOW VOIDS:  Present.  MISCELLANEOUS:  None.      IMPRESSION:    Multiple intraparenchymal enhancing masses several of which are   associated with hemorrhage and/or vasogenic edema. Metastatic disease is   a prime consideration.    2 adjacent lesions efface the fourth ventricular lumen however no   significant hydrocephalus is seen at this time.    < end of copied text >      BRONCHOSCOPY RESULTS PENDING    Xray ankle/knee personally reviewed: no fracture/dislocations, no lytic/blastic lesions    Consultant notes reviewed: Pulm, NSx Magan Potter M.D.  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 primary s/p bronch pending bx results    Interval Hx / subjective: wife, sister in law, daughter at bedside at various time this morning; pt c/o of worsening vertigo/dizziness now at rest not relieved with meclizine and N/V not relieved by zofran; dizziness now triggered by movement of extraocular muscles and rapid alternating movements    ROS:  CON: dizziness, lightheadedness; NO fever, chills  HEENT: continued change to vision; denied changes to smell, hearing, taste; denied throat pain, chest pain  CV: denied chest pain, racing heart, skipped beats  RESP: denied SOB, cough, wheezing  GI: denied nausea, vomiting, heartburn; diarrhea, constipation, dark or bloody stool  : denied flank pain; pain or burning with urination; bloody urine  MSK: left lower leg pain, no swelling; denied muscle ache  ENDO: denied heat or cold intolerance  NEURO: endorsed trouble finding words; denied headache, slurred speech; tingling, numbness  SKIN: denied itching, rash, other skin changes    MEDICATIONS  (STANDING):  dexamethasone  Injectable 2 milliGRAM(s) IV Push three times a day  docusate sodium 100 milliGRAM(s) Oral three times a day  enoxaparin Injectable 40 milliGRAM(s) SubCutaneous at bedtime  levETIRAcetam 500 milliGRAM(s) Oral two times a day  meclizine 25 milliGRAM(s) Oral two times a day  polyethylene glycol 3350 17 Gram(s) Oral two times a day  QUEtiapine 25 milliGRAM(s) Oral at bedtime  sodium chloride 0.9%. 1000 milliLiter(s) (75 mL/Hr) IV Continuous <Continuous>    MEDICATIONS  (PRN):  acetaminophen   Tablet .. 650 milliGRAM(s) Oral every 6 hours PRN Temp greater or equal to 38C (100.4F), Mild Pain (1 - 3), Moderate Pain (4 - 6)  ibuprofen  Tablet. 200 milliGRAM(s) Oral every 6 hours PRN Mild Pain (1 - 3)  ibuprofen  Tablet. 600 milliGRAM(s) Oral three times a day PRN Moderate Pain (4 - 6)  LORazepam     Tablet 0.5 milliGRAM(s) Oral once PRN Anxiety  melatonin 3 milliGRAM(s) Oral at bedtime PRN Insomnia  ondansetron Injectable 4 milliGRAM(s) IV Push every 6 hours PRN Nausea    Objective:  Vital Signs Last 24 Hrs  T(C): 36.6 (01 Sep 2019 05:51), Max: 36.6 (31 Aug 2019 21:13)  T(F): 97.9 (01 Sep 2019 05:51), Max: 97.9 (01 Sep 2019 05:51)  HR: 63 (01 Sep 2019 05:51) (61 - 64)  BP: 122/64 (01 Sep 2019 05:51) (110/63 - 122/64)  BP(mean): --  RR: 18 (01 Sep 2019 05:51) (17 - 18)  SpO2: 98% (01 Sep 2019 05:51) (98% - 98%)    Physical Exam:  GEN: NAD, less active this morning  HEENT: PERRL, EOMI; mucous moist; neck supple without LAD  CV: RRR, S1, S2; no M/R/G; good capillary refill  PULM: LCTAB; not using accessory muscles  ABD: normal bowel sounds; non-distended, soft; non-tender; no rebound, no guarding  Extremities: no clubbing, cyanosis; no edema; DP and radial pulses palpable    NEURO:   CN:  decreased visual acuity  OEM: not done due to severe vertigo provoked by exam  sensation to the face intact bilat V1, V2, V3  decreased                  14.1   15.85 )-----------( 232      ( 09-01 @ 07:20 )             42.3     09-01    128<L>  |  88<L>  |  24<H>  ----------------------------<  100<H>  4.2   |  23  |  0.84    Ca    9.0      01 Sep 2019 07:20  Phos  3.6     09-01  Mg     2.3     09-01    TPro  6.8  /  Alb  3.8  /  TBili  0.6  /  DBili  x   /  AST  16  /  ALT  82<H>  /  AlkPhos  100  09-01      < from: MR Head w/wo IV Cont (08.28.19 @ 20:10) >  EXAM:  MR BRAIN WAW IC        PROCEDURE DATE:  Aug 28 2019         INTERPRETATION:  INDICATIONS:  Multiple brain lesions seen on CT   concerning for metastatic disease.    TECHNIQUE:  Multiplanar imaging was performed using T1 weighted, T2   weighted and FLAIR sequences.  Diffusion weighted and susceptibility   sensitive images were also obtained.  Following intravenous gadolinium,   multiplanar T1 weighted images were performed. 6 cc Gadavist were   administered. 1.5 cc were discarded.      COMPARISON EXAMINATION:  Brain CT 8/28/2019      FINDINGS:    VENTRICLES AND SULCI:  Prominent in size compatible with age-appropriate   volume loss.  INTRA-AXIAL:  Multiple enhancing masses are seen throughout the cerebral   and cerebellar hemispheres as seen on the prior CT scan. Several lesions   demonstrate susceptibility artifact with or without precontrast T1   hyperintensity and heterogeneous T2 signal compatible with associated   hemorrhage. Several lesions are associated with vasogenic edema.  A left occipital mass with significant edema measures 1.4 x 1.3 cm.   A mass within the vermis measures 2.3 x 1.4 cm.   A mass within the right cerebellar hemisphere measures 1.8 x 1.6 cm.   A mass within the left cerebellum involving the cerebellar tonsil   measures 1.8 x 1.8 cm and an adjacent mass within or projecting into the   fourth ventricle measures 1.5 x 1 cm effacing the fourth ventricular   lumen. This nodule significantly indents the adjacent medulla.    Scattered foci of white matter T2 hyperintensity are seen compatible with   mild to moderate microvascular type white matter changes.  EXTRA-AXIAL:  No mass or collection.  VISUALIZED SINUSES:  Normal.  VISUALIZED MASTOIDS:  Clear.  CALVARIUM:  Normal.  CAROTID FLOW VOIDS:  Present.  MISCELLANEOUS:  None.      IMPRESSION:    Multiple intraparenchymal enhancing masses several of which are   associated with hemorrhage and/or vasogenic edema. Metastatic disease is   a prime consideration.    2 adjacent lesions efface the fourth ventricular lumen however no   significant hydrocephalus is seen at this time.    < end of copied text >      BRONCHOSCOPY RESULTS PENDING    Xray ankle/knee personally reviewed: no fracture/dislocations, no lytic/blastic lesions    Consultant notes reviewed: Pulm, NSx  Team d/w Nsx: re: patient symptoms, no surgical intervention at this time, if worsens/+neurological deficits can obtain repeat imaging

## 2019-09-01 NOTE — PROGRESS NOTE ADULT - ASSESSMENT
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  S/p bronchoscopy, pending tissue biopsy results.  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  S/p bronchoscopy, pending tissue biopsy results.  Onc c/s, recs appreciated.

## 2019-09-01 NOTE — CHART NOTE - NSCHARTNOTEFT_GEN_A_CORE
Called by RN due to patient feeling dizzy. Patient seen and examined at bedside. Patient says he feels the room is spinning and that he has vomited twice (non-bloody). Patient says he has been unable to eat anything for two days due to his nausea/vomiting. Patient is concerned that  he cannot eat. Patient denies headache, CP, SOB, dyspnea, cough, hemoptysis, abd pain, lightheadedness, leg pain/swelling. Patient endorses blurry vision which has been noted in the chart previously. Exam unchanged from chart review.     Patient is due to get his meclizine. Will give him small bolus as he is unable to eat anything for two days

## 2019-09-01 NOTE — PROGRESS NOTE ADULT - PROBLEM SELECTOR PLAN 1
multiple lesions seen on CTH noncon and redemonstrated on MR Brain most likely representing metastasis disease from suspected primary lung CA  - vision change likely 2/2 to occipital lesions  - ataxia likely 2/2 to cerebellar lesions  - neurosurgery c/s, recs reviewed and appreciated  - c/w previous dose of dexamethasone 1.5 mg TID; leukocytosis w/ bands likely 2/2 to steroid use  - c/w levetiracetam 500 mg BID for seizure ppx  - oncology c/s, recs appreciated  - fall risk precautions  - PT c/s on pt with worsening ataxia 2/2 to brain metastases  - meclizine for dizziness  - rad/onc eval re: RT once primary malignancy confirmed multiple lesions seen on CTH noncon and redemonstrated on MR Brain most likely representing metastasis disease from suspected primary lung CA  - vision change likely 2/2 to occipital lesions  - ataxia likely 2/2 to cerebellar lesions  - neurosurgery c/s, recs reviewed and appreciated  - c/w levetiracetam 500 mg BID for seizure ppx  - oncology c/s, recs appreciated  - fall risk precautions  - PT c/s on pt with worsening ataxia 2/2 to brain metastases  - meclizine for dizziness  - rad/onc eval re: RT once primary malignancy confirmed  - worsening presenting sxs, will increase steroids: decadron 2 mg IV TID

## 2019-09-01 NOTE — PROGRESS NOTE ADULT - PROBLEM SELECTOR PLAN 5
pt reported overnight on 8/28-29 lower left leg pain radiating up and down similar to pain previously reported s/p fall with avulsion fracture per pt  - XR left ankle and XR left knee w/o fx/dislocation/lytic or blastic lesions

## 2019-09-01 NOTE — PROGRESS NOTE ADULT - ATTENDING COMMENTS
Patient seen and examined, d/w Dr. Potter, agree with above.     Patient with complaints of persistent nausea/vertigo today, unable to tolerate PO. Also complaining of abdominal distension/constipation and difficulty sleeping. Dose of steroids increased, will see if it helps, if new focal deficits on neuro exam will repeat head imaging. Still pending biopsy results, onc plan to be determined, will reach out to rad/onc in coming days re: radiation therapy. Discussed with patient and family at bedside, they are in agreement with obtaining palliative assistance with symptom management. Emotional support provided.

## 2019-09-02 DIAGNOSIS — R11.0 NAUSEA: ICD-10-CM

## 2019-09-02 DIAGNOSIS — E43 UNSPECIFIED SEVERE PROTEIN-CALORIE MALNUTRITION: ICD-10-CM

## 2019-09-02 LAB
ALBUMIN SERPL ELPH-MCNC: 4.1 G/DL — SIGNIFICANT CHANGE UP (ref 3.3–5)
ALP SERPL-CCNC: 99 U/L — SIGNIFICANT CHANGE UP (ref 40–120)
ALT FLD-CCNC: 60 U/L — HIGH (ref 4–41)
ANION GAP SERPL CALC-SCNC: 17 MMO/L — HIGH (ref 7–14)
AST SERPL-CCNC: 11 U/L — SIGNIFICANT CHANGE UP (ref 4–40)
BILIRUB SERPL-MCNC: 0.6 MG/DL — SIGNIFICANT CHANGE UP (ref 0.2–1.2)
BUN SERPL-MCNC: 22 MG/DL — SIGNIFICANT CHANGE UP (ref 7–23)
CALCIUM SERPL-MCNC: 9.3 MG/DL — SIGNIFICANT CHANGE UP (ref 8.4–10.5)
CHLORIDE SERPL-SCNC: 93 MMOL/L — LOW (ref 98–107)
CO2 SERPL-SCNC: 19 MMOL/L — LOW (ref 22–31)
CREAT SERPL-MCNC: 0.76 MG/DL — SIGNIFICANT CHANGE UP (ref 0.5–1.3)
GLUCOSE SERPL-MCNC: 79 MG/DL — SIGNIFICANT CHANGE UP (ref 70–99)
HCT VFR BLD CALC: 44.6 % — SIGNIFICANT CHANGE UP (ref 39–50)
HGB BLD-MCNC: 14.5 G/DL — SIGNIFICANT CHANGE UP (ref 13–17)
MAGNESIUM SERPL-MCNC: 2.3 MG/DL — SIGNIFICANT CHANGE UP (ref 1.6–2.6)
MCHC RBC-ENTMCNC: 28.4 PG — SIGNIFICANT CHANGE UP (ref 27–34)
MCHC RBC-ENTMCNC: 32.5 % — SIGNIFICANT CHANGE UP (ref 32–36)
MCV RBC AUTO: 87.5 FL — SIGNIFICANT CHANGE UP (ref 80–100)
NRBC # FLD: 0 K/UL — SIGNIFICANT CHANGE UP (ref 0–0)
PHOSPHATE SERPL-MCNC: 4.2 MG/DL — SIGNIFICANT CHANGE UP (ref 2.5–4.5)
PLATELET # BLD AUTO: 233 K/UL — SIGNIFICANT CHANGE UP (ref 150–400)
PMV BLD: 10.9 FL — SIGNIFICANT CHANGE UP (ref 7–13)
POTASSIUM SERPL-MCNC: 4.3 MMOL/L — SIGNIFICANT CHANGE UP (ref 3.5–5.3)
POTASSIUM SERPL-SCNC: 4.3 MMOL/L — SIGNIFICANT CHANGE UP (ref 3.5–5.3)
PROT SERPL-MCNC: 7.2 G/DL — SIGNIFICANT CHANGE UP (ref 6–8.3)
RBC # BLD: 5.1 M/UL — SIGNIFICANT CHANGE UP (ref 4.2–5.8)
RBC # FLD: 12.7 % — SIGNIFICANT CHANGE UP (ref 10.3–14.5)
SODIUM SERPL-SCNC: 129 MMOL/L — LOW (ref 135–145)
WBC # BLD: 16.87 K/UL — HIGH (ref 3.8–10.5)
WBC # FLD AUTO: 16.87 K/UL — HIGH (ref 3.8–10.5)

## 2019-09-02 PROCEDURE — 70450 CT HEAD/BRAIN W/O DYE: CPT | Mod: 26

## 2019-09-02 PROCEDURE — 99233 SBSQ HOSP IP/OBS HIGH 50: CPT | Mod: GC

## 2019-09-02 RX ORDER — SIMETHICONE 80 MG/1
80 TABLET, CHEWABLE ORAL ONCE
Refills: 0 | Status: DISCONTINUED | OUTPATIENT
Start: 2019-09-02 | End: 2019-09-02

## 2019-09-02 RX ORDER — SODIUM CHLORIDE 9 MG/ML
1000 INJECTION INTRAMUSCULAR; INTRAVENOUS; SUBCUTANEOUS
Refills: 0 | Status: DISCONTINUED | OUTPATIENT
Start: 2019-09-02 | End: 2019-09-02

## 2019-09-02 RX ORDER — SODIUM CHLORIDE 9 MG/ML
1000 INJECTION INTRAMUSCULAR; INTRAVENOUS; SUBCUTANEOUS
Refills: 0 | Status: DISCONTINUED | OUTPATIENT
Start: 2019-09-02 | End: 2019-09-03

## 2019-09-02 RX ADMIN — SODIUM CHLORIDE 100 MILLILITER(S): 9 INJECTION INTRAMUSCULAR; INTRAVENOUS; SUBCUTANEOUS at 13:46

## 2019-09-02 RX ADMIN — Medication 650 MILLIGRAM(S): at 09:09

## 2019-09-02 RX ADMIN — Medication 100 MILLIGRAM(S): at 13:47

## 2019-09-02 RX ADMIN — LEVETIRACETAM 400 MILLIGRAM(S): 250 TABLET, FILM COATED ORAL at 18:24

## 2019-09-02 RX ADMIN — ENOXAPARIN SODIUM 40 MILLIGRAM(S): 100 INJECTION SUBCUTANEOUS at 21:22

## 2019-09-02 RX ADMIN — LEVETIRACETAM 400 MILLIGRAM(S): 250 TABLET, FILM COATED ORAL at 06:59

## 2019-09-02 RX ADMIN — POLYETHYLENE GLYCOL 3350 17 GRAM(S): 17 POWDER, FOR SOLUTION ORAL at 18:26

## 2019-09-02 RX ADMIN — Medication 3 MILLIGRAM(S): at 22:22

## 2019-09-02 RX ADMIN — Medication 650 MILLIGRAM(S): at 10:00

## 2019-09-02 RX ADMIN — Medication 25 MILLIGRAM(S): at 18:26

## 2019-09-02 RX ADMIN — QUETIAPINE FUMARATE 25 MILLIGRAM(S): 200 TABLET, FILM COATED ORAL at 21:22

## 2019-09-02 RX ADMIN — Medication 30 MILLILITER(S): at 18:27

## 2019-09-02 RX ADMIN — Medication 2 MILLIGRAM(S): at 21:21

## 2019-09-02 RX ADMIN — Medication 100 MILLIGRAM(S): at 21:21

## 2019-09-02 RX ADMIN — Medication 2 MILLIGRAM(S): at 06:59

## 2019-09-02 RX ADMIN — Medication 2 MILLIGRAM(S): at 13:45

## 2019-09-02 NOTE — DIETITIAN INITIAL EVALUATION ADULT. - PROBLEM SELECTOR PLAN 4
diet: regular; NPO after midnight for possible bronch tomorrow  VTE: SCDs in the setting of possible hemorrhage from brain lesion

## 2019-09-02 NOTE — PROGRESS NOTE ADULT - PROBLEM SELECTOR PLAN 3
primary lung CA in pt with 50 pack year smoking hx vs. metastatic dx from unknown primary  - pulmonology appreciated, s/p bronchoscopy w/ bx   - f/u biopsy results primary lung CA in pt with 50 pack year smoking hx vs. metastatic dx from unknown primary  - pulmonology appreciated, s/p bronchoscopy w/ bx   - biopsy results pending

## 2019-09-02 NOTE — DIETITIAN INITIAL EVALUATION ADULT. - ENERGY NEEDS
Ht: 167.64cm Wt: 58kg 9/1/19BMI: 20.6  IBW: 142 pounds / 64.5kg  Edema: None noted  Pressure Injuries: None noted

## 2019-09-02 NOTE — DIETITIAN INITIAL EVALUATION ADULT. - PERTINENT MEDS FT
MEDICATIONS  (STANDING):  dexamethasone  Injectable 2 milliGRAM(s) IV Push three times a day  docusate sodium 100 milliGRAM(s) Oral three times a day  enoxaparin Injectable 40 milliGRAM(s) SubCutaneous at bedtime  levETIRAcetam  IVPB 500 milliGRAM(s) IV Intermittent every 12 hours  meclizine 25 milliGRAM(s) Oral two times a day  polyethylene glycol 3350 17 Gram(s) Oral two times a day  QUEtiapine 25 milliGRAM(s) Oral at bedtime  sodium chloride 0.9%. 1000 milliLiter(s) (100 mL/Hr) IV Continuous <Continuous>  sodium chloride 0.9%. 1000 milliLiter(s) (75 mL/Hr) IV Continuous <Continuous>    MEDICATIONS  (PRN):  acetaminophen   Tablet .. 650 milliGRAM(s) Oral every 6 hours PRN Temp greater or equal to 38C (100.4F), Mild Pain (1 - 3), Moderate Pain (4 - 6)  aluminum hydroxide/magnesium hydroxide/simethicone Suspension 30 milliLiter(s) Oral every 6 hours PRN Dyspepsia  ibuprofen  Tablet. 200 milliGRAM(s) Oral every 6 hours PRN Mild Pain (1 - 3)  ibuprofen  Tablet. 600 milliGRAM(s) Oral three times a day PRN Moderate Pain (4 - 6)  LORazepam     Tablet 0.5 milliGRAM(s) Oral once PRN Anxiety  melatonin 3 milliGRAM(s) Oral at bedtime PRN Insomnia  ondansetron Injectable 4 milliGRAM(s) IV Push every 6 hours PRN Nausea

## 2019-09-02 NOTE — PROGRESS NOTE ADULT - PROBLEM SELECTOR PLAN 6
diet: regular  VTE: SCDs in the setting of possible hemorrhage from brain lesion    Discussed with Dr. Vargas diet: nutrition following; recs reviewed and appreciated     - will change pt's diet to clear liquids with ensure as pt c/o nausea provoked by the smell of hospital food     - nutrition heretofore will need to be part of the GOC discussion  VTE: SCDs in the setting of possible hemorrhage from brain lesion  dispo: palliative c/s, recs appreaciated; chaplaincy c/s, recs appreciated    Discussed with Dr. Vargas pt reported overnight on 8/28-29 lower left leg pain radiating up and down similar to pain previously reported s/p fall with avulsion fracture per pt  - XR left ankle and XR left knee w/o fx/dislocation/lytic or blastic lesions

## 2019-09-02 NOTE — DIETITIAN INITIAL EVALUATION ADULT. - ADD RECOMMEND
1) As discussed with medical team - consider alternate means of nutrition/hydration given poor PO intake if consistent with goals of care. 2) Monitor weights, PO intake/diet tolerance, pertinent labs.  3) Provide assistance and encouragement PO intake as able if patient accepting of oral intake.  4) Re-consult RD for additional interventions as needed.

## 2019-09-02 NOTE — PROGRESS NOTE ADULT - ATTENDING COMMENTS
Patient seen and examined, d/w Dr. Potter, agree with above.     Patient finally asleep at time of my exam, d/w daughter at bedside, who wishes to let patient rest undisturbed. With brain mets and concern for lung primary, awaiting result of biopsy to confirm diagnosis and guide oncologic plan, to f/u with rad/onc re: possible radiation therapy, c/w symptomatic management of nausea/vertigo, with severe protein calorie malnutrition, on clear liquid diet w/ ensure, emotional support provided, f/u palliative recs. Family would like to hear diagnosis and what the options might be before deciding how to proceed (pursuing further treatment with understanding it would not be curative vs just focusing on comfort).  Should plan for multidisciplinary meeting with family once diagnosis established.

## 2019-09-02 NOTE — PROGRESS NOTE ADULT - PROBLEM SELECTOR PLAN 4
possible metastatic dx in the setting of findings above in subcentimeter hypodense region seen on CT abdomen  - s/p abdominal U/S (wnl) possible metastatic dx in the setting of findings above in subcentimeter hypodense region seen on CT abdomen  - s/p abdominal U/S (wnl)  - ALT still mildly elevated

## 2019-09-02 NOTE — PROGRESS NOTE ADULT - SUBJECTIVE AND OBJECTIVE BOX
Magan Potter M.D.  PGY1  [phone] / [id number]    68y M w/ unknown hx a/f metastatic dx to the brain from suspected lung primary s/p bronch pending bx results    Interval Hx / subjective: wife, sister in law, daughter at bedside at various time this morning; pt c/o of worsening vertigo/dizziness now at rest not relieved with meclizine and N/V not relieved by zofran; dizziness now triggered by movement of extraocular muscles and rapid alternating movements    ROS:  CON: dizziness, lightheadedness; NO fever, chills  HEENT: continued change to vision; denied changes to smell, hearing, taste; denied throat pain, chest pain  CV: denied chest pain, racing heart, skipped beats  RESP: denied SOB, cough, wheezing  GI: denied nausea, vomiting, heartburn; diarrhea, constipation, dark or bloody stool  : denied flank pain; pain or burning with urination; bloody urine  MSK: left lower leg pain, no swelling; denied muscle ache  ENDO: denied heat or cold intolerance  NEURO: endorsed trouble finding words; denied headache, slurred speech; tingling, numbness  SKIN: denied itching, rash, other skin changes    MEDICATIONS  (STANDING):  dexamethasone  Injectable 2 milliGRAM(s) IV Push three times a day  docusate sodium 100 milliGRAM(s) Oral three times a day  enoxaparin Injectable 40 milliGRAM(s) SubCutaneous at bedtime  levETIRAcetam 500 milliGRAM(s) Oral two times a day  meclizine 25 milliGRAM(s) Oral two times a day  polyethylene glycol 3350 17 Gram(s) Oral two times a day  QUEtiapine 25 milliGRAM(s) Oral at bedtime  sodium chloride 0.9%. 1000 milliLiter(s) (75 mL/Hr) IV Continuous <Continuous>    MEDICATIONS  (PRN):  acetaminophen   Tablet .. 650 milliGRAM(s) Oral every 6 hours PRN Temp greater or equal to 38C (100.4F), Mild Pain (1 - 3), Moderate Pain (4 - 6)  ibuprofen  Tablet. 200 milliGRAM(s) Oral every 6 hours PRN Mild Pain (1 - 3)  ibuprofen  Tablet. 600 milliGRAM(s) Oral three times a day PRN Moderate Pain (4 - 6)  LORazepam     Tablet 0.5 milliGRAM(s) Oral once PRN Anxiety  melatonin 3 milliGRAM(s) Oral at bedtime PRN Insomnia  ondansetron Injectable 4 milliGRAM(s) IV Push every 6 hours PRN Nausea    Objective:  Vital Signs Last 24 Hrs  T(C): 36.6 (01 Sep 2019 05:51), Max: 36.6 (31 Aug 2019 21:13)  T(F): 97.9 (01 Sep 2019 05:51), Max: 97.9 (01 Sep 2019 05:51)  HR: 63 (01 Sep 2019 05:51) (61 - 64)  BP: 122/64 (01 Sep 2019 05:51) (110/63 - 122/64)  BP(mean): --  RR: 18 (01 Sep 2019 05:51) (17 - 18)  SpO2: 98% (01 Sep 2019 05:51) (98% - 98%)    Physical Exam:  GEN: NAD, less active this morning  HEENT: PERRL, EOMI; mucous moist; neck supple without LAD  CV: RRR, S1, S2; no M/R/G; good capillary refill  PULM: LCTAB; not using accessory muscles  ABD: normal bowel sounds; non-distended, soft; non-tender; no rebound, no guarding  Extremities: no clubbing, cyanosis; no edema; DP and radial pulses palpable    NEURO:   CN:  decreased visual acuity  OEM: not done due to severe vertigo provoked by exam  sensation to the face intact bilat V1, V2, V3  decreased                  14.1   15.85 )-----------( 232      ( 09-01 @ 07:20 )             42.3     09-01    128<L>  |  88<L>  |  24<H>  ----------------------------<  100<H>  4.2   |  23  |  0.84    Ca    9.0      01 Sep 2019 07:20  Phos  3.6     09-01  Mg     2.3     09-01    TPro  6.8  /  Alb  3.8  /  TBili  0.6  /  DBili  x   /  AST  16  /  ALT  82<H>  /  AlkPhos  100  09-01      < from: MR Head w/wo IV Cont (08.28.19 @ 20:10) >  EXAM:  MR BRAIN WAW IC        PROCEDURE DATE:  Aug 28 2019         INTERPRETATION:  INDICATIONS:  Multiple brain lesions seen on CT   concerning for metastatic disease.    TECHNIQUE:  Multiplanar imaging was performed using T1 weighted, T2   weighted and FLAIR sequences.  Diffusion weighted and susceptibility   sensitive images were also obtained.  Following intravenous gadolinium,   multiplanar T1 weighted images were performed. 6 cc Gadavist were   administered. 1.5 cc were discarded.      COMPARISON EXAMINATION:  Brain CT 8/28/2019      FINDINGS:    VENTRICLES AND SULCI:  Prominent in size compatible with age-appropriate   volume loss.  INTRA-AXIAL:  Multiple enhancing masses are seen throughout the cerebral   and cerebellar hemispheres as seen on the prior CT scan. Several lesions   demonstrate susceptibility artifact with or without precontrast T1   hyperintensity and heterogeneous T2 signal compatible with associated   hemorrhage. Several lesions are associated with vasogenic edema.  A left occipital mass with significant edema measures 1.4 x 1.3 cm.   A mass within the vermis measures 2.3 x 1.4 cm.   A mass within the right cerebellar hemisphere measures 1.8 x 1.6 cm.   A mass within the left cerebellum involving the cerebellar tonsil   measures 1.8 x 1.8 cm and an adjacent mass within or projecting into the   fourth ventricle measures 1.5 x 1 cm effacing the fourth ventricular   lumen. This nodule significantly indents the adjacent medulla.    Scattered foci of white matter T2 hyperintensity are seen compatible with   mild to moderate microvascular type white matter changes.  EXTRA-AXIAL:  No mass or collection.  VISUALIZED SINUSES:  Normal.  VISUALIZED MASTOIDS:  Clear.  CALVARIUM:  Normal.  CAROTID FLOW VOIDS:  Present.  MISCELLANEOUS:  None.      IMPRESSION:    Multiple intraparenchymal enhancing masses several of which are   associated with hemorrhage and/or vasogenic edema. Metastatic disease is   a prime consideration.    2 adjacent lesions efface the fourth ventricular lumen however no   significant hydrocephalus is seen at this time.    < end of copied text >      BRONCHOSCOPY RESULTS PENDING    Xray ankle/knee personally reviewed: no fracture/dislocations, no lytic/blastic lesions    Consultant notes reviewed: Pulm, NSx  Team d/w Nsx: re: patient symptoms, no surgical intervention at this time, if worsens/+neurological deficits can obtain repeat imaging Magan Potter M.D.  PGY1  [phone] / [id number]    68y M w/ unknown hx a/f metastatic dx to the brain from suspected lung primary s/p bronch pending bx results    Interval Hx / subjective: increased vomiting yesterday with associated "chest tightness" not relieved by zofran; increased dizziness/vertigo now at rest unrelieved by meclizine; EKG negative for ST elevation or depression so not suggestive of acute ischemia; given 0.5 mg Haldol for nausea/vomiting and anxiety with good response in the morning; pending repeat CTH    ROS:  CON: dizziness, lightheadedness; NO fever, chills  HEENT: continued change to vision; denied changes to smell, hearing, taste; denied throat pain, chest pain  CV: denied chest pain, racing heart, skipped beats  RESP: denied SOB, cough, wheezing  GI: nausea, vomiting; denied heartburn, diarrhea, constipation, dark or bloody stool  : denied flank pain; pain or burning with urination; bloody urine  MSK: left lower leg pain, no swelling; denied muscle ache  ENDO: denied heat or cold intolerance  NEURO: endorsed trouble finding words; denied headache, slurred speech; tingling, numbness  SKIN: denied itching, rash, other skin changes    MEDICATIONS  (STANDING):  dexamethasone  Injectable 2 milliGRAM(s) IV Push three times a day  docusate sodium 100 milliGRAM(s) Oral three times a day  enoxaparin Injectable 40 milliGRAM(s) SubCutaneous at bedtime  levETIRAcetam 500 milliGRAM(s) Oral two times a day  meclizine 25 milliGRAM(s) Oral two times a day  polyethylene glycol 3350 17 Gram(s) Oral two times a day  QUEtiapine 25 milliGRAM(s) Oral at bedtime  sodium chloride 0.9%. 1000 milliLiter(s) (75 mL/Hr) IV Continuous <Continuous>    MEDICATIONS  (PRN):  acetaminophen   Tablet .. 650 milliGRAM(s) Oral every 6 hours PRN Temp greater or equal to 38C (100.4F), Mild Pain (1 - 3), Moderate Pain (4 - 6)  ibuprofen  Tablet. 200 milliGRAM(s) Oral every 6 hours PRN Mild Pain (1 - 3)  ibuprofen  Tablet. 600 milliGRAM(s) Oral three times a day PRN Moderate Pain (4 - 6)  LORazepam     Tablet 0.5 milliGRAM(s) Oral once PRN Anxiety  melatonin 3 milliGRAM(s) Oral at bedtime PRN Insomnia  ondansetron Injectable 4 milliGRAM(s) IV Push every 6 hours PRN Nausea    Objective:  Vital Signs Last 24 Hrs  T(C): 36.6 (01 Sep 2019 05:51), Max: 36.6 (31 Aug 2019 21:13)  T(F): 97.9 (01 Sep 2019 05:51), Max: 97.9 (01 Sep 2019 05:51)  HR: 63 (01 Sep 2019 05:51) (61 - 64)  BP: 122/64 (01 Sep 2019 05:51) (110/63 - 122/64)  BP(mean): --  RR: 18 (01 Sep 2019 05:51) (17 - 18)  SpO2: 98% (01 Sep 2019 05:51) (98% - 98%)    Physical Exam:  GEN: NAD, less active this morning  HEENT: PERRL, EOMI; mucous moist; neck supple without LAD  CV: RRR, S1, S2; no M/R/G; good capillary refill  PULM: LCTAB; not using accessory muscles  ABD: normal bowel sounds; non-distended, soft; non-tender; no rebound, no guarding  Extremities: no clubbing, cyanosis; no edema; DP and radial pulses palpable    NEURO EXAM  - Mental Status:  AAOx3; speech is fluent   - Cranial Nerves II-XII:    II:  PERRLA; visual fields are full to confrontation  III, IV, VI:  EOMI, no nystagmus, but limited due to provocation of vertigo  V:  facial sensation is intact in the V1-V3 distribution bilaterally.  VII:  face is symmetric with normal eye closure and smile  VIII:  hearing is intact to finger rub  IX, X:  uvula is midline and soft palate rises symmetrically  XI:  head turning and shoulder shrug are intact bilaterally  XII:  tongue protrudes in the midline  - Motor:  right bicep, tricep 4/5; strength is 5/5 throughout the rest of the MSK; normal muscle bulk and tone throughout; no pronator drift  - Sensory:  intact to light touch, pin prick, vibration, and joint-position sense throughout  - Coordination:  finger-nose-finger and heel-knee-shin intact without dysmetria but provokes nausea/vomiting/vertigo; unable to complete rapid alternating hand movements  - Gait: pt too dizzy to walk                     14.1   15.85 )-----------( 232      ( 09-01 @ 07:20 )             42.3     09-01    128<L>  |  88<L>  |  24<H>  ----------------------------<  100<H>  4.2   |  23  |  0.84    Ca    9.0      01 Sep 2019 07:20  Phos  3.6     09-01  Mg     2.3     09-01    TPro  6.8  /  Alb  3.8  /  TBili  0.6  /  DBili  x   /  AST  16  /  ALT  82<H>  /  AlkPhos  100  09-01      < from: MR Head w/wo IV Cont (08.28.19 @ 20:10) >  EXAM:  MR BRAIN WAW IC        PROCEDURE DATE:  Aug 28 2019         INTERPRETATION:  INDICATIONS:  Multiple brain lesions seen on CT   concerning for metastatic disease.    TECHNIQUE:  Multiplanar imaging was performed using T1 weighted, T2   weighted and FLAIR sequences.  Diffusion weighted and susceptibility   sensitive images were also obtained.  Following intravenous gadolinium,   multiplanar T1 weighted images were performed. 6 cc Gadavist were   administered. 1.5 cc were discarded.      COMPARISON EXAMINATION:  Brain CT 8/28/2019      FINDINGS:    VENTRICLES AND SULCI:  Prominent in size compatible with age-appropriate   volume loss.  INTRA-AXIAL:  Multiple enhancing masses are seen throughout the cerebral   and cerebellar hemispheres as seen on the prior CT scan. Several lesions   demonstrate susceptibility artifact with or without precontrast T1   hyperintensity and heterogeneous T2 signal compatible with associated   hemorrhage. Several lesions are associated with vasogenic edema.  A left occipital mass with significant edema measures 1.4 x 1.3 cm.   A mass within the vermis measures 2.3 x 1.4 cm.   A mass within the right cerebellar hemisphere measures 1.8 x 1.6 cm.   A mass within the left cerebellum involving the cerebellar tonsil   measures 1.8 x 1.8 cm and an adjacent mass within or projecting into the   fourth ventricle measures 1.5 x 1 cm effacing the fourth ventricular   lumen. This nodule significantly indents the adjacent medulla.    Scattered foci of white matter T2 hyperintensity are seen compatible with   mild to moderate microvascular type white matter changes.  EXTRA-AXIAL:  No mass or collection.  VISUALIZED SINUSES:  Normal.  VISUALIZED MASTOIDS:  Clear.  CALVARIUM:  Normal.  CAROTID FLOW VOIDS:  Present.  MISCELLANEOUS:  None.      IMPRESSION:    Multiple intraparenchymal enhancing masses several of which are   associated with hemorrhage and/or vasogenic edema. Metastatic disease is   a prime consideration.    2 adjacent lesions efface the fourth ventricular lumen however no   significant hydrocephalus is seen at this time.    < end of copied text >      BRONCHOSCOPY RESULTS PENDING  Xray ankle/knee personally reviewed: no fracture/dislocations, no lytic/blastic lesions    < from: CT Head No Cont (09.02.19 @ 09:28) >  EXAM:  CT BRAIN        PROCEDURE DATE:  Sep  2 2019         INTERPRETATION:  Clinical indications: Follow-up brain lesions.    Multiple axial sections were performed from base of skull to vertex   without contrast enhancement. Coronal and sagittal reconstructions were   performed as well    Abnormal high attenuated lesions involving the posterior fossa and   supratentorial region are again identified. Overall these findings appear   unchanged in size and configuration and demonstrate some surrounding   edema. No significant shift or herniation is seen.    Evaluation of the osseous structures with the appropriate window appears   unremarkable    The visualized paranasal sinuses mastoid and middle ear regions appear   clear.    Impression: Nosignificant change when allowing for differences in   technique.    < end of copied text >      Consultant notes reviewed: Pulm, NSx  Team d/w Nsx: re: patient symptoms, no surgical intervention at this time, if worsens/+neurological deficits can obtain repeat imaging Magan Potter M.D.  PGY1  [phone] / [id number]    68y M w/ unknown hx a/f metastatic dx to the brain from suspected lung primary s/p bronch pending bx results    Interval Hx / subjective: increased vomiting yesterday with associated "chest tightness" not relieved by zofran; increased dizziness/vertigo now at rest unrelieved by meclizine; EKG negative for ST elevation or depression so not suggestive of acute ischemia; given 0.5 mg Haldol for nausea/vomiting and anxiety with good response in the morning; pending repeat CTH    ROS:  CON: dizziness, lightheadedness; NO fever, chills  HEENT: continued change to vision; denied changes to smell, hearing, taste; denied throat pain, chest pain  CV: denied chest pain, racing heart, skipped beats  RESP: denied SOB, cough, wheezing  GI: nausea, vomiting; denied heartburn, diarrhea, constipation, dark or bloody stool  : denied flank pain; pain or burning with urination; bloody urine  MSK: left lower leg pain, no swelling; denied muscle ache  ENDO: denied heat or cold intolerance  NEURO: endorsed trouble finding words; denied headache, slurred speech; tingling, numbness  SKIN: denied itching, rash, other skin changes    MEDICATIONS  (STANDING):  dexamethasone  Injectable 2 milliGRAM(s) IV Push three times a day  docusate sodium 100 milliGRAM(s) Oral three times a day  enoxaparin Injectable 40 milliGRAM(s) SubCutaneous at bedtime  levETIRAcetam 500 milliGRAM(s) Oral two times a day  meclizine 25 milliGRAM(s) Oral two times a day  polyethylene glycol 3350 17 Gram(s) Oral two times a day  QUEtiapine 25 milliGRAM(s) Oral at bedtime  sodium chloride 0.9%. 1000 milliLiter(s) (75 mL/Hr) IV Continuous <Continuous>    MEDICATIONS  (PRN):  acetaminophen   Tablet .. 650 milliGRAM(s) Oral every 6 hours PRN Temp greater or equal to 38C (100.4F), Mild Pain (1 - 3), Moderate Pain (4 - 6)  ibuprofen  Tablet. 200 milliGRAM(s) Oral every 6 hours PRN Mild Pain (1 - 3)  ibuprofen  Tablet. 600 milliGRAM(s) Oral three times a day PRN Moderate Pain (4 - 6)  LORazepam     Tablet 0.5 milliGRAM(s) Oral once PRN Anxiety  melatonin 3 milliGRAM(s) Oral at bedtime PRN Insomnia  ondansetron Injectable 4 milliGRAM(s) IV Push every 6 hours PRN Nausea    Objective:  Vital Signs Last 24 Hrs  T(C): 36.6 (01 Sep 2019 05:51), Max: 36.6 (31 Aug 2019 21:13)  T(F): 97.9 (01 Sep 2019 05:51), Max: 97.9 (01 Sep 2019 05:51)  HR: 63 (01 Sep 2019 05:51) (61 - 64)  BP: 122/64 (01 Sep 2019 05:51) (110/63 - 122/64)  BP(mean): --  RR: 18 (01 Sep 2019 05:51) (17 - 18)  SpO2: 98% (01 Sep 2019 05:51) (98% - 98%)    Physical Exam:  GEN: NAD, less active this morning  HEENT: PERRL, EOMI; mucous moist; neck supple without LAD  CV: RRR, S1, S2; no M/R/G; good capillary refill  PULM: LCTAB; not using accessory muscles  ABD: normal bowel sounds; non-distended, soft; non-tender; no rebound, no guarding  Extremities: no clubbing, cyanosis; no edema; DP and radial pulses palpable    NEURO EXAM  - Mental Status:  AAOx3; speech is fluent   - Cranial Nerves II-XII:    II:  PERRLA; visual fields are full to confrontation  III, IV, VI:  EOMI, no nystagmus, but limited due to provocation of vertigo  V:  facial sensation is intact in the V1-V3 distribution bilaterally.  VII:  face is symmetric with normal eye closure and smile  VIII:  hearing is intact to finger rub  IX, X:  uvula is midline and soft palate rises symmetrically  XI:  head turning and shoulder shrug are intact bilaterally  XII:  tongue protrudes in the midline  - Motor:  right bicep, tricep 4/5; strength is 5/5 throughout the rest of the MSK; normal muscle bulk and tone throughout; no pronator drift  - Sensory:  intact to light touch, pin prick, vibration, and joint-position sense throughout  - Coordination:  finger-nose-finger and heel-knee-shin intact without dysmetria but provokes nausea/vomiting/vertigo; unable to complete rapid alternating hand movements  - Gait: pt too dizzy to walk                     14.1   15.85 )-----------( 232      ( 09-01 @ 07:20 )             42.3     09-01    128<L>  |  88<L>  |  24<H>  ----------------------------<  100<H>  4.2   |  23  |  0.84    Ca    9.0      01 Sep 2019 07:20  Phos  3.6     09-01  Mg     2.3     09-01    TPro  6.8  /  Alb  3.8  /  TBili  0.6  /  DBili  x   /  AST  16  /  ALT  82<H>  /  AlkPhos  100  09-01      BRONCHOSCOPY RESULTS PENDING    Imaging personally reviewed: CT head, no interval change    < from: CT Head No Cont (09.02.19 @ 09:28) >  EXAM:  CT BRAIN        PROCEDURE DATE:  Sep  2 2019     INTERPRETATION:  Clinical indications: Follow-up brain lesions.    Multiple axial sections were performed from base of skull to vertex   without contrast enhancement. Coronal and sagittal reconstructions were   performed as well    Abnormal high attenuated lesions involving the posterior fossa and   supratentorial region are again identified. Overall these findings appear   unchanged in size and configuration and demonstrate some surrounding   edema. No significant shift or herniation is seen.    Evaluation of the osseous structures with the appropriate window appears   unremarkable    The visualized paranasal sinuses mastoid and middle ear regions appear   clear.    Impression: Nosignificant change when allowing for differences in   technique.    < end of copied text >

## 2019-09-02 NOTE — DIETITIAN INITIAL EVALUATION ADULT. - PERTINENT LABORATORY DATA
09-02 Na129 mmol/L<L> Glu 79 mg/dL K+ 4.3 mmol/L Cr  0.76 mg/dL BUN 22 mg/dL 09-02 Phos 4.2 mg/dL 09-02 Alb 4.1 g/dL

## 2019-09-02 NOTE — PROGRESS NOTE ADULT - PROBLEM SELECTOR PLAN 7
diet: nutrition following; recs reviewed and appreciated     - will change pt's diet to clear liquids with ensure as pt c/o nausea provoked by the smell of hospital food     - nutrition heretofore will need to be part of the GOC discussion  VTE: SCDs in the setting of possible hemorrhage from brain lesion  dispo: palliative c/s, recs appreaciated; chaplaincy c/s, recs appreciated    Discussed with Dr. Vargas

## 2019-09-02 NOTE — DIETITIAN INITIAL EVALUATION ADULT. - OTHER INFO
RD visited with patient for nutrition consult.  Daughter at bedside.  As per daughter, patient continues to have nausea, vomiting; reported with 0% consumption of meals since Friday.  As per daughter, cannot tolerate smelling various foods, as she feels it is contributing to nausea/vomiting.  Contacted Team 1 - suggested goals of care discussion to determine patient wishes in regards to alternate means of nutrition/hydration & to consider changing diet to clear liquids with Ensure Clear supplements at this time. As per daughter, intake has been diminished the past couple of weeks, but has now been significantly reduced from baseline. Palliative care reported to be consulted. Reported usual body weight 62kg PTA.  Current weight 9/1 - 58kg 9/1/19. Loss of 4 kg / 6.5% in < 1 month.  No reported food allergies or intolerances; denies chewing/swallowing issues as a contributing factor towards diminished PO intake.  Patient currently not likely adequately meet nutritional needs via PO intake. RD visited with patient for nutrition consult.  Daughter at bedside.  As per daughter, patient continues to have nausea, vomiting; reported with 0% consumption of meals since Friday.  As per daughter, cannot tolerate smelling various foods, as she feels it is contributing to nausea/vomiting.  Contacted Team 1 - suggested goals of care discussion to determine patient wishes in regards to alternate means of nutrition/hydration & to consider changing diet to clear liquids with Ensure Clear supplements at this time. As per daughter, intake has been diminished the past couple of weeks, but has now been significantly reduced from baseline. Palliative care reported to be consulted. Reported usual body weight 62kg PTA.  Current weight 9/1 - 58kg 9/1/19. Loss of 4 kg / 6.5% in < 1 month.  No reported food allergies or intolerances; denies chewing/swallowing issues as a contributing factor towards diminished PO intake.  Patient not likely to adequately meet nutritional needs orally due to current medical status.

## 2019-09-02 NOTE — CHART NOTE - NSCHARTNOTEFT_GEN_A_CORE
NUTRITION SERVICES     Upon Nutritional Assessment by the Registered Dietitian your patient was determined to meet criteria/ has evidence of the following diagnosis/diagnoses:  [ ] Mild Protein Calorie Malnutrition   [ ] Moderate Protein Calorie Malnutrition   [X] Severe Protein Calorie Malnutrition   [ ] Unspecified Protein Calorie Malnutrition   [ ] Underweight / BMI <19  [ ] Morbid Obesity / BMI >40    Findings as based on:  •  Comprehensive nutritional assessment and consultation    Please refer to Initial Dietitian Evaluation via documents section of [employer] EMR for further recommendations.    Valerie Escamilla, MS, RDN, CDN

## 2019-09-02 NOTE — DIETITIAN INITIAL EVALUATION ADULT. - PROBLEM SELECTOR PLAN 1
multiple lesions seen on CTH noncon concerning for metastasis disease from unknown primary vs. less likely primary neoplasm  - disease vertigo, ataxia, and vision change likely 2/2 to brain lesion  - neurosurgery c/s, recs reviewed and appreciated  - f/u MR Brain  - c/w previous dose of dexamethasone 1.5 mg TID; leukocytosis w/ bands likely 2/2 to steroid use  - c/w levetiracetam 500 mg BID  - fall risk precautions

## 2019-09-02 NOTE — PROGRESS NOTE ADULT - ASSESSMENT
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  S/p bronchoscopy, pending tissue biopsy results.  Onc c/s, recs appreciated. 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  S/p bronchoscopy, pending tissue biopsy results.  Onc c/s, recs appreciated. Course c/b continued nausea/vertigo.

## 2019-09-02 NOTE — PROGRESS NOTE ADULT - PROBLEM SELECTOR PLAN 5
pt reported overnight on 8/28-29 lower left leg pain radiating up and down similar to pain previously reported s/p fall with avulsion fracture per pt  - XR left ankle and XR left knee w/o fx/dislocation/lytic or blastic lesions - change to clear liquid diet, c/w symptomatic management, f/u palliative recs

## 2019-09-03 DIAGNOSIS — C79.31 SECONDARY MALIGNANT NEOPLASM OF BRAIN: ICD-10-CM

## 2019-09-03 DIAGNOSIS — E87.1 HYPO-OSMOLALITY AND HYPONATREMIA: ICD-10-CM

## 2019-09-03 DIAGNOSIS — Z51.5 ENCOUNTER FOR PALLIATIVE CARE: ICD-10-CM

## 2019-09-03 DIAGNOSIS — C34.90 MALIGNANT NEOPLASM OF UNSPECIFIED PART OF UNSPECIFIED BRONCHUS OR LUNG: ICD-10-CM

## 2019-09-03 LAB
ALBUMIN SERPL ELPH-MCNC: 3.5 G/DL — SIGNIFICANT CHANGE UP (ref 3.3–5)
ALP SERPL-CCNC: 91 U/L — SIGNIFICANT CHANGE UP (ref 40–120)
ALT FLD-CCNC: 45 U/L — HIGH (ref 4–41)
ANION GAP SERPL CALC-SCNC: 10 MMO/L — SIGNIFICANT CHANGE UP (ref 7–14)
ANION GAP SERPL CALC-SCNC: 14 MMO/L — SIGNIFICANT CHANGE UP (ref 7–14)
AST SERPL-CCNC: 13 U/L — SIGNIFICANT CHANGE UP (ref 4–40)
BILIRUB SERPL-MCNC: 0.5 MG/DL — SIGNIFICANT CHANGE UP (ref 0.2–1.2)
BUN SERPL-MCNC: 18 MG/DL — SIGNIFICANT CHANGE UP (ref 7–23)
BUN SERPL-MCNC: 20 MG/DL — SIGNIFICANT CHANGE UP (ref 7–23)
CALCIUM SERPL-MCNC: 8.7 MG/DL — SIGNIFICANT CHANGE UP (ref 8.4–10.5)
CALCIUM SERPL-MCNC: 9 MG/DL — SIGNIFICANT CHANGE UP (ref 8.4–10.5)
CHLORIDE SERPL-SCNC: 93 MMOL/L — LOW (ref 98–107)
CHLORIDE SERPL-SCNC: 96 MMOL/L — LOW (ref 98–107)
CO2 SERPL-SCNC: 22 MMOL/L — SIGNIFICANT CHANGE UP (ref 22–31)
CO2 SERPL-SCNC: 23 MMOL/L — SIGNIFICANT CHANGE UP (ref 22–31)
CREAT ?TM UR-MCNC: 32.1 MG/DL — SIGNIFICANT CHANGE UP
CREAT SERPL-MCNC: 0.66 MG/DL — SIGNIFICANT CHANGE UP (ref 0.5–1.3)
CREAT SERPL-MCNC: 0.71 MG/DL — SIGNIFICANT CHANGE UP (ref 0.5–1.3)
GLUCOSE SERPL-MCNC: 136 MG/DL — HIGH (ref 70–99)
GLUCOSE SERPL-MCNC: 95 MG/DL — SIGNIFICANT CHANGE UP (ref 70–99)
HCT VFR BLD CALC: 40.6 % — SIGNIFICANT CHANGE UP (ref 39–50)
HGB BLD-MCNC: 13.4 G/DL — SIGNIFICANT CHANGE UP (ref 13–17)
MAGNESIUM SERPL-MCNC: 2.3 MG/DL — SIGNIFICANT CHANGE UP (ref 1.6–2.6)
MAGNESIUM SERPL-MCNC: 2.4 MG/DL — SIGNIFICANT CHANGE UP (ref 1.6–2.6)
MCHC RBC-ENTMCNC: 28.6 PG — SIGNIFICANT CHANGE UP (ref 27–34)
MCHC RBC-ENTMCNC: 33 % — SIGNIFICANT CHANGE UP (ref 32–36)
MCV RBC AUTO: 86.6 FL — SIGNIFICANT CHANGE UP (ref 80–100)
NRBC # FLD: 0 K/UL — SIGNIFICANT CHANGE UP (ref 0–0)
OSMOLALITY UR: 430 MOSMO/KG — SIGNIFICANT CHANGE UP (ref 50–1200)
PHOSPHATE SERPL-MCNC: 2.9 MG/DL — SIGNIFICANT CHANGE UP (ref 2.5–4.5)
PHOSPHATE SERPL-MCNC: 3.3 MG/DL — SIGNIFICANT CHANGE UP (ref 2.5–4.5)
PLATELET # BLD AUTO: 229 K/UL — SIGNIFICANT CHANGE UP (ref 150–400)
PMV BLD: 10.9 FL — SIGNIFICANT CHANGE UP (ref 7–13)
POTASSIUM SERPL-MCNC: 4.3 MMOL/L — SIGNIFICANT CHANGE UP (ref 3.5–5.3)
POTASSIUM SERPL-MCNC: 4.7 MMOL/L — SIGNIFICANT CHANGE UP (ref 3.5–5.3)
POTASSIUM SERPL-SCNC: 4.3 MMOL/L — SIGNIFICANT CHANGE UP (ref 3.5–5.3)
POTASSIUM SERPL-SCNC: 4.7 MMOL/L — SIGNIFICANT CHANGE UP (ref 3.5–5.3)
PROT SERPL-MCNC: 6.1 G/DL — SIGNIFICANT CHANGE UP (ref 6–8.3)
RBC # BLD: 4.69 M/UL — SIGNIFICANT CHANGE UP (ref 4.2–5.8)
RBC # FLD: 12.6 % — SIGNIFICANT CHANGE UP (ref 10.3–14.5)
SODIUM SERPL-SCNC: 129 MMOL/L — LOW (ref 135–145)
SODIUM SERPL-SCNC: 129 MMOL/L — LOW (ref 135–145)
SODIUM UR-SCNC: 96 MMOL/L — SIGNIFICANT CHANGE UP
WBC # BLD: 12.28 K/UL — HIGH (ref 3.8–10.5)
WBC # FLD AUTO: 12.28 K/UL — HIGH (ref 3.8–10.5)

## 2019-09-03 PROCEDURE — 99223 1ST HOSP IP/OBS HIGH 75: CPT | Mod: GC

## 2019-09-03 PROCEDURE — 99233 SBSQ HOSP IP/OBS HIGH 50: CPT | Mod: GC

## 2019-09-03 RX ORDER — IPRATROPIUM/ALBUTEROL SULFATE 18-103MCG
3 AEROSOL WITH ADAPTER (GRAM) INHALATION EVERY 6 HOURS
Refills: 0 | Status: DISCONTINUED | OUTPATIENT
Start: 2019-09-03 | End: 2019-09-06

## 2019-09-03 RX ORDER — DEXAMETHASONE 0.5 MG/5ML
4 ELIXIR ORAL EVERY 6 HOURS
Refills: 0 | Status: DISCONTINUED | OUTPATIENT
Start: 2019-09-03 | End: 2019-09-04

## 2019-09-03 RX ORDER — PANTOPRAZOLE SODIUM 20 MG/1
40 TABLET, DELAYED RELEASE ORAL
Refills: 0 | Status: DISCONTINUED | OUTPATIENT
Start: 2019-09-03 | End: 2019-09-06

## 2019-09-03 RX ADMIN — Medication 25 MILLIGRAM(S): at 05:33

## 2019-09-03 RX ADMIN — LEVETIRACETAM 400 MILLIGRAM(S): 250 TABLET, FILM COATED ORAL at 05:34

## 2019-09-03 RX ADMIN — Medication 2 MILLIGRAM(S): at 13:30

## 2019-09-03 RX ADMIN — Medication 4 MILLIGRAM(S): at 17:31

## 2019-09-03 RX ADMIN — ENOXAPARIN SODIUM 40 MILLIGRAM(S): 100 INJECTION SUBCUTANEOUS at 21:36

## 2019-09-03 RX ADMIN — Medication 3 MILLIGRAM(S): at 21:36

## 2019-09-03 RX ADMIN — QUETIAPINE FUMARATE 25 MILLIGRAM(S): 200 TABLET, FILM COATED ORAL at 21:36

## 2019-09-03 RX ADMIN — Medication 30 MILLILITER(S): at 21:35

## 2019-09-03 RX ADMIN — Medication 2 MILLIGRAM(S): at 05:32

## 2019-09-03 RX ADMIN — LEVETIRACETAM 400 MILLIGRAM(S): 250 TABLET, FILM COATED ORAL at 17:31

## 2019-09-03 RX ADMIN — Medication 25 MILLIGRAM(S): at 17:31

## 2019-09-03 NOTE — PROGRESS NOTE ADULT - PROBLEM SELECTOR PLAN 4
possible metastatic dx in the setting of findings above in subcentimeter hypodense region seen on CT abdomen  - s/p abdominal U/S (wnl)  - ALT still mildly elevated

## 2019-09-03 NOTE — PROGRESS NOTE ADULT - PROBLEM SELECTOR PLAN 1
multiple lesions seen on CTH noncon and redemonstrated on MR Brain most likely representing metastasis disease from suspected primary lung CA  - CT A/P negative for suspicious lesion  - vision change likely 2/2 to occipital lesions  - ataxia likely 2/2 to cerebellar lesions  - neurosurgery c/s, interventions limited due to the extensive number of brain lesions  - c/w levetiracetam 500 mg BID for seizure ppx  - oncology c/s, recs appreciated  - fall risk precautions  - PT c/s on pt with worsening ataxia 2/2 to brain metastases  - meclizine for dizziness  - rad/onc eval re: RT once primary malignancy confirmed  - worsening presenting sxs, steroids were increased to decadron 2 mg IV TID, now increased to 4 mg IV TID per palliative recs  - 0.5 Haldol IV given last night for worsening N/V/vertigo with good symptom control  - no interval change on repeat CTH

## 2019-09-03 NOTE — CONSULT NOTE ADULT - PROBLEM SELECTOR RECOMMENDATION 4
CT revealed multiple supra and infratentorial lesions; a high midline   cerebellar vermis lesion measuring 1.8 x 2.8 x 1.8 cm, and inferior heterogeneous mass effacing the fourth ventricle measuring 2.5 x 2.5 x 2.1 cm, multiple additional lesions noted in the cerebral hemispheres hemisphere, including one in the right frontal lobe measuring 1.3 x 1.0 x 0.92 cm, left parietal occipital lesion with increased attenuation and fluid level measuring 1.4 x 1.5 x 1.8 cm, edema and mass effect with effacement of the sulci. Multiple additional smaller lesions are   noted.     -Increase Decadron to 4 mg IV q6hr   -C/w Protonix 40 mg po daily (consider q12hr), switch to IV for symptoms of n/v  -C/w Tylenol prn headache  -Consider Oxycodone 10 mg IR po prn moderate-severe pain  -C/w Keppra

## 2019-09-03 NOTE — CONSULT NOTE ADULT - PROBLEM SELECTOR PROBLEM 3
Brain metastases Primary malignant neoplasm of lung metastatic to other site, unspecified laterality

## 2019-09-03 NOTE — CONSULT NOTE ADULT - PROBLEM SELECTOR RECOMMENDATION 5
Patient seen with family at bedside. HCP documentation signed; patient's daughter. Patient has been living with daughter and her family in [place] on and off. Reports his physical condition has been deteriorating and symptoms have been worsening for the part year. Family is awaiting pathology results and oncology input prior to discussing further goals. Patient remains full code. Will continue to follow for symptom management and on-going GOC discussion. Patient seen with family at bedside. HCP documentation left bedside. Patient has been living with daughter and her family in [place] on and off. Reports his physical condition has been deteriorating and symptoms have been worsening for the part year. Family is awaiting pathology results and oncology input prior to discussing further goals. Patient remains full code. Will continue to follow for symptom management and on-going GOC discussion.

## 2019-09-03 NOTE — PROGRESS NOTE ADULT - PROBLEM SELECTOR PLAN 3
primary lung CA in pt with 50 pack year smoking hx vs. metastatic dx from unknown primary  - pulmonology appreciated, s/p bronchoscopy w/ bx   - biopsy results pending

## 2019-09-03 NOTE — CONSULT NOTE ADULT - PROBLEM SELECTOR RECOMMENDATION 9
CT imaging to have multiple brain lesions concerning for metastatic disease  and bilateral solid pulmonary nodules measuring 2.3 cm in RUL and 0.7 cm REINALDO as well as additional groundglass and mixed solid and ground glass nodular opacities bilaterally. Pulmonary consulted for bronchoscopy for biopsy of lung mass. Lung primary vs other although no other clear source on imaging.   - Will be add on for possible bronchoscopy tomorrow  - NPO after MN  - Obtain list of home medications, please hold any anticoagulation home meds if any  - Please obtain CBC, CMP, Coags, Type and Screen for AM  - Duonebs PRN given CT suggestive of emphysema    Larry So MD  Pulmonary & Critical Care Fellow  [phone]
Most likely 2/2 metastatic lesions in the brain; multiple supra and infratentorial lesions concerning for metastasis including and not limited to: a high midline   cerebellar vermis lesion and inferior heterogeneous mass effacing the fourth ventricle, mass effect and edema.    -Increase Decadron to 4 mg IV q6hr  -C/w Protonix 40 mg po daily (consider q12hr), switch to IV for symptoms of n/v  -C/w Meclazine   -Fall precautions

## 2019-09-03 NOTE — CONSULT NOTE ADULT - ASSESSMENT
69 y/o M from [facility] w/ 52 year/pack smoking history, found to have suspected primary lung cancer with metastasis to brain, s/p bronchoscopy and biopsy pending pathology, c/o progressive ataxia, changes in vision, nausea/vomting, and weight loss. Palliative care consulted for symptom management and GOC.
69 yo M with no reported medical history, former smoker (52 pack years, quit 20 yrs ago) p/w one month history of worsening ataxia found to have multiple brain lesions with vasogenic edema and b/l spiculated lung nodules for which Oncology is consulted.    1. Lung nodules with multiple brain lesions: concerning for primary lung ca with mets to brain  - CT C/A/P with no intrathoracic lymphadenopathy or solid organ lesions in abdomen or pelvis  - MRI brain with multiple intraparenchymal enhancing masses with associated hemorrhage and/or vasogenic edema  - seen by neurosurgery and pt currently on keppra and steroids  - pt is planned for bronchoscopy today, f/u pathology  - Rad Onc eval  - further management to be determined once biopsy results available
69yo male with brain mets, lung nodules     PLAN:   MRI brain w/wo contrast   mets w/u   should consider biopsy of lung nodule   Case discussed with attending neurosurgeon.
Patient is a 69 yo M from [facility] with unclear PMH who presents with unsteady gait and vertigo symptoms for about 1.5 months found on CT imaging to have multiple brain lesions concerning for metastatic disease  and bilateral solid pulmonary nodules measuring 2.3 cm in RUL and 0.7 cm REINALDO as well as additional groundglass and mixed solid and ground glass nodular opacities bilaterally. Pulmonary consulted for bronchoscopy for biopsy of lung mass.

## 2019-09-03 NOTE — PROGRESS NOTE ADULT - ASSESSMENT
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  S/p bronchoscopy, pending tissue biopsy results.  Onc following, recs appreciated. Course c/b continued nausea/vertigo, but that symptoms improved with increased steroid suggests vasogenic edema as possible cause.

## 2019-09-03 NOTE — PROGRESS NOTE ADULT - ATTENDING COMMENTS
Vertigo improved on IV steroids but complaining of insomnia.  Awaiting final path report. Noted hyponatremia - worsening with IV fluids  - PRN melatonin at bedtime  - f/u path report  - palliative following, recs reviewed and appreciated  - agree with fluid restriction for likely SIADH

## 2019-09-03 NOTE — CONSULT NOTE ADULT - ATTENDING COMMENTS
68 M with unclear pmh who comes in with worsening gait over last few months, found to have multiple brain lesions and lung nodules concerning for malignancy.  Denies night sweats/weight loss/hemoptysis/fevers/chills/cough/wheezing.  Only complaint is worsening unsteady gait.  He is unsure of what medications he is on.  Does not think he had colon cancer screening.      1) lung nodules  - unclear etiology, likely malignancy given smoking history versus other primary  - will discuss case with our interventional pulmonologist, will decide on bronch versus recommending IR biopsy of lesion given how peripheral it is  - would keep NPO after midnight tonight for possible procedure tomorrow  - check coags, please perform medication reconciliation (patient does not think he is on any blood thinners, plavix, etc., but need to clarify with his family when they bring in meds)    2) emphysema  - would start on bronchodilators prn
Patient seen and examined  Agree with above
I discussed the case with the fellow and agree with the above.     Tamica Mireles MD
Pt seen with fellow. Agree with above.  Awaiting path but presumed primary lung cancer mets to brain.  Rec increasing decadron to 4mg iv Q 6 hours atc.  Haldol low dose prn nausea.  Goal of pt and family is to follow up with onc if being offered dmt.  HCP form given to pt to fill out.

## 2019-09-03 NOTE — PROGRESS NOTE ADULT - PROBLEM SELECTOR PLAN 9
diet: nutrition following; recs reviewed and appreciated     - will change pt's diet to clear liquids with ensure as pt c/o nausea provoked by the smell of hospital food     - nutrition heretofore will need to be part of the GOC discussion  VTE: SCDs in the setting of possible hemorrhage from brain lesion  dispo: palliative c/s, recs appreaciated; chaplaincy c/s, recs appreciated    case discussed with Dr. Kerr

## 2019-09-03 NOTE — CONSULT NOTE ADULT - PROBLEM SELECTOR RECOMMENDATION 3
52 pack/year smoking history, CT reveals emphysema and b/l pulmonary nodules in RUL and REINALDO, s/p bronchoscopy & biopsy, awaiting pathology report.     -f/u w/ oncology

## 2019-09-03 NOTE — PROGRESS NOTE ADULT - SUBJECTIVE AND OBJECTIVE BOX
Magan Potter M.D.  PGY1  [phone] / [phone]    68y M w/ unknown hx a/f metastatic dx to the brain from suspected lung primary s/p bronch pending bx results    Interval Hx / subjective: feels better today than yesterday, less nausea and little vomiting, but still dizzy especially when provoked. Attempted to drink ensure but dislikes the taste; however, expressed to me that he will try to drink for his health.    ROS:  CON: dizziness, lightheadedness; NO fever, chills  HEENT: continued change to vision; denied changes to smell, hearing, taste; denied throat pain, chest pain  CV: denied chest pain, racing heart, skipped beats  RESP: denied SOB, cough, wheezing  GI: nausea, vomiting; denied heartburn, diarrhea, constipation, dark or bloody stool  : denied flank pain; pain or burning with urination; bloody urine  MSK: left lower leg pain now less than previously, no swelling; denied muscle ache  ENDO: denied heat or cold intolerance  NEURO: endorsed trouble finding words; denied headache, slurred speech; tingling, numbness  SKIN: denied itching, rash, other skin changes    MEDICATIONS  (STANDING):  dexamethasone  Injectable 2 milliGRAM(s) IV Push three times a day  docusate sodium 100 milliGRAM(s) Oral three times a day  enoxaparin Injectable 40 milliGRAM(s) SubCutaneous at bedtime  levETIRAcetam 500 milliGRAM(s) Oral two times a day  meclizine 25 milliGRAM(s) Oral two times a day  polyethylene glycol 3350 17 Gram(s) Oral two times a day  QUEtiapine 25 milliGRAM(s) Oral at bedtime  sodium chloride 0.9%. 1000 milliLiter(s) (75 mL/Hr) IV Continuous <Continuous>    MEDICATIONS  (PRN):  acetaminophen   Tablet .. 650 milliGRAM(s) Oral every 6 hours PRN Temp greater or equal to 38C (100.4F), Mild Pain (1 - 3), Moderate Pain (4 - 6)  ibuprofen  Tablet. 200 milliGRAM(s) Oral every 6 hours PRN Mild Pain (1 - 3)  ibuprofen  Tablet. 600 milliGRAM(s) Oral three times a day PRN Moderate Pain (4 - 6)  LORazepam     Tablet 0.5 milliGRAM(s) Oral once PRN Anxiety  melatonin 3 milliGRAM(s) Oral at bedtime PRN Insomnia  ondansetron Injectable 4 milliGRAM(s) IV Push every 6 hours PRN Nausea    Objective:  Vital Signs Last 24 Hrs  T(C): 36.9 (03 Sep 2019 14:18), Max: 36.9 (03 Sep 2019 04:50)  T(F): 98.4 (03 Sep 2019 14:18), Max: 98.5 (03 Sep 2019 04:50)  HR: 66 (03 Sep 2019 14:18) (66 - 80)  BP: 102/55 (03 Sep 2019 14:18) (102/55 - 120/74)  BP(mean): --  RR: 17 (03 Sep 2019 14:18) (16 - 17)  SpO2: 97% (03 Sep 2019 14:18) (97% - 100%)    Physical Exam:  GEN: NAD, less active this morning  HEENT: PERRL, EOMI though limited because it provokes vertigo; mucous dry; neck gaunt, without LAD  CV: RRR, S1, S2; no M/R/G; good capillary refill  PULM: LCTAB; not using accessory muscles  ABD: normal bowel sounds; non-distended, soft; non-tender; no rebound, no guarding  Extremities: no clubbing, cyanosis; no edema; DP and radial pulses palpable    NEURO EXAM  - Mental Status:  AAOx3; speech is fluent   - Cranial Nerves II-XII:    II:  PERRLA; visual fields are full to confrontation  III, IV, VI:  EOMI, no nystagmus, but limited due to provocation of vertigo  V:  facial sensation is intact in the V1-V3 distribution bilaterally.  VII:  face is symmetric with normal eye closure and smile  VIII:  hearing is intact to finger rub  IX, X:  uvula is midline and soft palate rises symmetrically  XI:  head turning and shoulder shrug are intact bilaterally  XII:  tongue protrudes in the midline  - Motor:  right bicep, tricep 4/5; strength is 5/5 throughout the rest of the MSK; decreased muscle bulk and tone throughout; no pronator drift  - Sensory:  intact to light touch and joint-position sense throughout  - Coordination:  finger-nose-finger and heel-knee-shin intact without dysmetria but provokes nausea/vomiting/vertigo; unable to complete rapid alternating hand movements  - Gait: pt too dizzy to walk                          13.4   12.28 )-----------( 229      ( 03 Sep 2019 06:00 )             40.6     09-03    129<L>  |  93<L>  |  20  ----------------------------<  95  4.7   |  22  |  0.71    Ca    8.7      03 Sep 2019 06:00  Phos  3.3     09-03  Mg     2.4     09-03    TPro  6.1  /  Alb  3.5  /  TBili  0.5  /  DBili  x   /  AST  13  /  ALT  45<H>  /  AlkPhos  91  09-03    BRONCHOSCOPY RESULTS PENDING    Imaging personally reviewed: CT head, no interval change    < from: CT Head No Cont (09.02.19 @ 09:28) >  EXAM:  CT BRAIN        PROCEDURE DATE:  Sep  2 2019     INTERPRETATION:  Clinical indications: Follow-up brain lesions.    Multiple axial sections were performed from base of skull to vertex   without contrast enhancement. Coronal and sagittal reconstructions were   performed as well    Abnormal high attenuated lesions involving the posterior fossa and   supratentorial region are again identified. Overall these findings appear   unchanged in size and configuration and demonstrate some surrounding   edema. No significant shift or herniation is seen.    Evaluation of the osseous structures with the appropriate window appears   unremarkable    The visualized paranasal sinuses mastoid and middle ear regions appear   clear.    Impression: Nosignificant change when allowing for differences in   technique.    < end of copied text >    palliative and nutrition recs reviewed by team

## 2019-09-03 NOTE — PROGRESS NOTE ADULT - PROBLEM SELECTOR PLAN 6
Na 135 at presentation with iggy of 128 to date  - ddx includes poor PO intake vs. central SIADH 2/2 to brain lesions vs. paraneoplastic syndrome  - hyponatremia did not respond to fluids over 2 days  - Erick 96 and UOsm 430 suggest element of cerebral salt wasting  - will begin fluid restrictions and repeat BMP this PM

## 2019-09-03 NOTE — CONSULT NOTE ADULT - SUBJECTIVE AND OBJECTIVE BOX
HPI:  69 y/o M w/ PMHx of 52 pack-year smoking hx quit 20 years ago presenting with 1-month history of acutely worsening ataxia.  He was seen by a doctor in [facility], was found to have masses in the brain by imaging and [place] the [place] further [place]. He reports having mild ataxia for approximately 1 year, before it acutely worsened approximately 1 month ago. He had a witnessed episode of syncope, without associated shaking or post-ictal state. Since then, he has had severe ataxia whenever he stands up, requiring him to use a cane to walk. The ataxia is somewhat improved after sleeping, otherwise nothing has helped alleviate the symptoms, including Betahistine Dihydrochloride (for vertigo and Meniere’s disease) given in [facility]. He reports the severity of his ataxia has been constant for the past month.     About 2 weeks ago, the patient experienced a sharp, 10/10 headache with 1 episode of non-bloody vomiting. He described the headache as the worst of his life, with associated sensitivity to bright lights and loud sounds. He has had intermitted episodes of headache since then that have not been as severe. He has not vomited since then.  The patient also describes new blurriness in his peripheral vision bilaterally and 1 week of mild tremor. He reports subjective weight loss over the past month, estimated to be ~3kg, as noticed by a decreased arm circumference. No fevers or chills. No shortness of breath, chest pain, cough, hemoptysis, or fatigue. Apart from the single episode of vomiting with his headache, he has had no nausea, vomiting, diarrhea, or constipation. No hearing loss or difficulty chewing. There is some mild dysphagia that has been present for years. In the ED, pt had T 97.7, HT 87, /81, RR 16, SpO2 99%RA.  He had leukocytosis to 19.59 (s/p steroids in [facility]) without bandemia, and transaminitis w/ AST 48, , and alk phos of 126. (28 Aug 2019 18:25)     Patient was started on IV decadron, PPI, Keppra, Zofran, and Tylenol as inpatient. Patient s/p bronchoscopy w/ biopsy awaiting pathology results.     PERTINENT PM/SXH:   No pertinent past medical history    No significant past surgical history    FAMILY HISTORY: Noncontributory     ITEMS NOT CHECKED ARE NOT PRESENT    SOCIAL HISTORY:   Significant other/partner:  [x]  Children:  [x]  Islam/Spirituality: Mu-ism  Substance hx:  [ ]   Tobacco hx:  [x]   Alcohol hx: [ ]   Home Opioid hx:  [ ] I-Stop Reference No:  Living Situation: [x]Home  [ ]Long term care  [ ]Rehab [ ]Other    ADVANCE DIRECTIVES:  No  DNR  MOLST  [ ]  Living Will  [ ]   DECISION MAKER(s):  [x] Health Care Proxy(s)  [ ] Surrogate(s)  [ ] Guardian           Name(s): Phone Number(s):  Mary Zavaleta (daughter): [phone]    BASELINE (I)ADL(s) (prior to admission): ambulatory  [place]: [x]Total  [ ] Moderate [ ]Dependent    Allergies    No Known Allergies    Intolerances    MEDICATIONS  (STANDING):  dexamethasone  Injectable 4 milliGRAM(s) IV Push every 6 hours  docusate sodium 100 milliGRAM(s) Oral three times a day  enoxaparin Injectable 40 milliGRAM(s) SubCutaneous at bedtime  levETIRAcetam  IVPB 500 milliGRAM(s) IV Intermittent every 12 hours  meclizine 25 milliGRAM(s) Oral two times a day  pantoprazole    Tablet 40 milliGRAM(s) Oral before breakfast  polyethylene glycol 3350 17 Gram(s) Oral two times a day  QUEtiapine 25 milliGRAM(s) Oral at bedtime    MEDICATIONS  (PRN):  acetaminophen   Tablet .. 650 milliGRAM(s) Oral every 6 hours PRN Temp greater or equal to 38C (100.4F), Mild Pain (1 - 3), Moderate Pain (4 - 6)  ALBUTerol/ipratropium for Nebulization 3 milliLiter(s) Nebulizer every 6 hours PRN Shortness of Breath and/or Wheezing  aluminum hydroxide/magnesium hydroxide/simethicone Suspension 30 milliLiter(s) Oral every 6 hours PRN Dyspepsia  LORazepam     Tablet 0.5 milliGRAM(s) Oral once PRN Anxiety  melatonin 3 milliGRAM(s) Oral at bedtime PRN Insomnia  ondansetron Injectable 4 milliGRAM(s) IV Push every 6 hours PRN Nausea    PRESENT SYMPTOMS: [ ]Unable to obtain due to poor mentation   Source if other than patient:  [ ]Family   [ ]Team     Pain: [x] yes [ ] no  QOL impact - minimal   Location - headache, left side                    Aggravating factors - occasionally light  Quality - aching  Radiation - none  Timing - intermittant  Severity (0-10 scale): 3/10  Minimal acceptable level (0-10 scale): 2    PAIN AD Score:     [email or web address] (press ctrl +  left click to view)    Dyspnea:                           [ ]Mild [ ]Moderate [ ]Severe  Anxiety:                             [x]Mild [ ]Moderate [ ]Severe  Fatigue:                             [x]Mild [ ]Moderate [ ]Severe  Nausea:                             [ ]Mild [x]Moderate [ ]Severe  Loss of appetite:              [ ]Mild [x]Moderate [ ]Severe  Constipation:                    [ ]Mild [ ]Moderate [ ]Severe    Other Symptoms: Ataxia, Diplopia  [ ]All other review of systems negative     Karnofsky Performance Score/Palliative Performance Status Version 2:  50%    http://npcrc.org/files/news/palliative_performance_scale_[email or web address].pdf  PHYSICAL EXAM:  Vital Signs Last 24 Hrs  T(C): 36.9 (03 Sep 2019 14:18), Max: 36.9 (03 Sep 2019 04:50)  T(F): 98.4 (03 Sep 2019 14:18), Max: 98.5 (03 Sep 2019 04:50)  HR: 66 (03 Sep 2019 14:18) (66 - 80)  BP: 102/55 (03 Sep 2019 14:18) (102/55 - 120/74)  BP(mean): --  RR: 17 (03 Sep 2019 14:18) (16 - 17)  SpO2: 97% (03 Sep 2019 14:18) (97% - 100%) I&O's Summary  GENERAL:  [x]Alert  [x]Oriented x3   [ ]Lethargic  [x]Cachexia  [ ]Unarousable  [x]Verbal  [ ]Non-Verbal  Behavioral: Calm  [ ] Anxiety  [ ] Delirium [ ] Agitation [ ] Other  HEENT:  [ ]Normal   x ]Dry mouth   [ ]ET Tube/Trach  [ ]Oral lesions  PULMONARY:   [x]Clear [ ]Tachypnea  [ ]Audible excessive secretions   [ ]Rhonchi        [ ]Right [ ]Left [ ]Bilateral  [ ]Crackles        [ ]Right [ ]Left [ ]Bilateral  [ ]Wheezing     [ ]Right [ ]Left [ ]Bilateral  CARDIOVASCULAR:    [x]Regular [ ]Irregular [ ]Tachy  [ ]Bryson [ ]Murmur [ ]Other  GASTROINTESTINAL:  [x]Soft  [ ]Distended   [x]+BS  [ ]Non tender [ ]Tender  [ ]PEG [ ]OGT/ NGT  Last BM: Today  GENITOURINARY:  [x]Normal [ ] Incontinent   [ ]Oliguria/Anuria   [ ]Jimenez  MUSCULOSKELETAL:   [ ]Normal   [x]Weakness  [ ]Bed/Wheelchair bound [ ]Edema  NEUROLOGIC:   [x]No focal deficits, Ataxia  [ ] Cognitive impairment  [ ] Dysphagia [ ]Dysarthria [ ] Paresis [ ]Other   SKIN:   [x]Normal   [ ]Pressure ulcer(s)  [ ]Rash    CRITICAL CARE:  [ ] Shock Present  [ ]Septic [ ]Cardiogenic [ ]Neurologic [ ]Hypovolemic  [ ]  Vasopressors [ ]  Inotropes   [ ] Respiratory failure present [ ] mechanical ventilation [ ] non-invasive ventilatory support [ ] High flow  [ ] Acute  [ ] Chronic [ ] Hypoxic  [ ] Hypercarbic [ ] Other  [ ] Other organ failure     LABS:                        13.4   12.28 )-----------( 229      ( 03 Sep 2019 06:00 )             40.6   09-03    129<L>  |  93<L>  |  20  ----------------------------<  95  4.7   |  22  |  0.71    Ca    8.7      03 Sep 2019 06:00  Phos  3.3     09-03  Mg     2.4     09-03    TPro  6.1  /  Alb  3.5  /  TBili  0.5  /  DBili  x   /  AST  13  /  ALT  45<H>  /  AlkPhos  91  09-03        RADIOLOGY & ADDITIONAL STUDIES: < from: CT Head No Cont (08.28.19 @ 12:01) >  FINDINGS:    There are multiple supra and infratentorial lesions several of which   demonstrate increased rim attenuation, with central decreased attenuation   concerning for metastasis including and not limited to: a high midline   cerebellar vermis lesion measuring 1.8 x 2.8 x 1.8 cm, AP, TR, CC, and   inferior heterogeneous mass effacing the fourth ventricle measuring 2.5 x   2.5 x 2.1 cm, AP, TR, CC, multiple additional lesions noted in the   cerebral hemispheres hemisphere, including one with increased attenuation   in the right frontal lobe measuring 1.3 x 1.0 x 0.92 cm, AP, TR, CC and   left parietal occipital lesion with increased attenuation and fluid fluid   level measuring 1.4 x 1.5 x 1.8 cm, AP, TR, CC, edema and mass effect   with effacement of the sulci. Multiple additional smaller lesions are   noted. MR with gadolinium is more sensitive for full assessment of   intracranial metastatic disease.    There is mild enlargement the ventricles and sulci consistent with volume   loss, there is enlargement and decreased attenuation along the forniceal   columns, (2:14). There is nodular white matter decreased attenuation,   likely microvascular disease, other etiologies not excluded. The   cavernous sinus vasculature is prominent greater than (400:24). There is   no large extra-axial collection, or significant midline shift.    Orbits demonstrate a rightward gaze preference. Paranasal sinuses and   mastoid air cells appear free of disease. The calvarium is intact.    IMPRESSION:    Multiple  lesions concerning for metastasis with hemorrhagic and/or   proteinaceous debris as detailed above, including left parietal occipital   mass with fluid fluid level with  edema and mass effect, MRI with   gadolinium will better assess. Basal cisterns are patent, no extra axial   collection or midline shift. Strongly suggest improved assessment using   MRI with gadolinium.    < end of copied text >  < from: CT Abdomen and Pelvis w/ IV Cont (08.28.19 @ 12:01) >    IMPRESSION:     Emphysema with bilateral solid pulmonary nodules measuring 2.3 cm in the   right upper lobe and 0.7 cm in the left upper lobe. Additional   groundglass and mixed solid and ground glass nodular opacities   bilaterally. Pulmonary nodules are indeterminate, but may be seen in the   setting of a primary lung malignancy. No intrathoracic lymphadenopathy.    No solid organ lesion or lymphadenopathy in the abdomen or pelvis.    < end of copied text >    PROTEIN CALORIE MALNUTRITION PRESENT: [x] Yes [ ] No  [ ] PPSV2 < or = to 30% [x] significant weight loss  [x] poor nutritional intake [ ] catabolic state [ ] anasarca     Albumin, Serum: 3.5 g/dL (09-03-19 @ 06:00)  Artificial Nutrition [ ]     REFERRALS:   [ ]Chaplaincy  [ ] Hospice  [ ]Child Life  [ ]Social Work  [x]Case management [ ]Holistic Therapy HPI:  67 y/o M w/ PMHx of 52 pack-year smoking hx quit 20 years ago presenting with 1-month history of acutely worsening ataxia.  He was seen by a doctor in [facility], was found to have masses in the brain by imaging and [place] the [place]. He reports having mild ataxia for approximately 1 year, before it acutely worsened approximately 1 month ago. He had a witnessed episode of syncope, without associated shaking or post-ictal state. Since then, he has had severe ataxia whenever he stands up, requiring him to use a cane to walk. The ataxia is somewhat improved after sleeping, otherwise nothing has helped alleviate the symptoms, including Betahistine Dihydrochloride (for vertigo and Meniere’s disease) given in [facility]. He reports the severity of his ataxia has been constant for the past month.     About 2 weeks ago, the patient experienced a sharp, 10/10 headache with 1 episode of non-bloody vomiting. He described the headache as the worst of his life, with associated sensitivity to bright lights and loud sounds. He has had intermitted episodes of headache since then that have not been as severe. He has not vomited since then.  The patient also describes new blurriness in his peripheral vision bilaterally and 1 week of mild tremor. He reports subjective weight loss over the past month, estimated to be ~3kg, as noticed by a decreased arm circumference. No fevers or chills. No shortness of breath, chest pain, cough, hemoptysis, or fatigue. Apart from the single episode of vomiting with his headache, he has had no nausea, vomiting, diarrhea, or constipation. No hearing loss or difficulty chewing. There is some mild dysphagia that has been present for years. In the ED, pt had T 97.7, HT 87, /81, RR 16, SpO2 99%RA.  He had leukocytosis to 19.59 (s/p steroids in [facility]) without bandemia, and transaminitis w/ AST 48, , and alk phos of 126. (28 Aug 2019 18:25)     Patient was started on IV decadron, PPI, Keppra, Zofran, and Tylenol as inpatient. Patient s/p bronchoscopy w/ biopsy awaiting pathology results.     PERTINENT PM/SXH:   No pertinent past medical history    No significant past surgical history    FAMILY HISTORY: Noncontributory     ITEMS NOT CHECKED ARE NOT PRESENT    SOCIAL HISTORY:   Significant other/partner:  [x]  Children:  [x]  Advent/Spirituality: Jain  Substance hx:  [ ]   Tobacco hx:  [x]   Alcohol hx: [ ]   Home Opioid hx:  [ ] I-Stop Reference No:  Living Situation: [x]Home  [ ]Long term care  [ ]Rehab [ ]Other    ADVANCE DIRECTIVES:  No  DNR  MOLST  [ ]  Living Will  [ ]   DECISION MAKER(s):  [x] Health Care Proxy(s)  [ ] Surrogate(s)  [ ] Guardian           Name(s): Phone Number(s):  Mary Zavaleta (daughter): [phone]    BASELINE (I)ADL(s) (prior to admission): ambulatory  [place]: [x]Total  [ ] Moderate [ ]Dependent    Allergies    No Known Allergies    Intolerances    MEDICATIONS  (STANDING):  dexamethasone  Injectable 4 milliGRAM(s) IV Push every 6 hours  docusate sodium 100 milliGRAM(s) Oral three times a day  enoxaparin Injectable 40 milliGRAM(s) SubCutaneous at bedtime  levETIRAcetam  IVPB 500 milliGRAM(s) IV Intermittent every 12 hours  meclizine 25 milliGRAM(s) Oral two times a day  pantoprazole    Tablet 40 milliGRAM(s) Oral before breakfast  polyethylene glycol 3350 17 Gram(s) Oral two times a day  QUEtiapine 25 milliGRAM(s) Oral at bedtime    MEDICATIONS  (PRN):  acetaminophen   Tablet .. 650 milliGRAM(s) Oral every 6 hours PRN Temp greater or equal to 38C (100.4F), Mild Pain (1 - 3), Moderate Pain (4 - 6)  ALBUTerol/ipratropium for Nebulization 3 milliLiter(s) Nebulizer every 6 hours PRN Shortness of Breath and/or Wheezing  aluminum hydroxide/magnesium hydroxide/simethicone Suspension 30 milliLiter(s) Oral every 6 hours PRN Dyspepsia  LORazepam     Tablet 0.5 milliGRAM(s) Oral once PRN Anxiety  melatonin 3 milliGRAM(s) Oral at bedtime PRN Insomnia  ondansetron Injectable 4 milliGRAM(s) IV Push every 6 hours PRN Nausea    PRESENT SYMPTOMS: [ ]Unable to obtain due to poor mentation   Source if other than patient:  [ ]Family   [ ]Team     Pain: [x] yes [ ] no  QOL impact - minimal   Location - headache, left side                    Aggravating factors - occasionally light  Quality - aching  Radiation - none  Timing - intermittant  Severity (0-10 scale): 3/10  Minimal acceptable level (0-10 scale): 2    PAIN AD Score:     [email or web address] (press ctrl +  left click to view)    Dyspnea:                           [ ]Mild [ ]Moderate [ ]Severe  Anxiety:                             [x]Mild [ ]Moderate [ ]Severe  Fatigue:                             [x]Mild [ ]Moderate [ ]Severe  Nausea:                             [ ]Mild [x]Moderate [ ]Severe  Loss of appetite:              [ ]Mild [x]Moderate [ ]Severe  Constipation:                    [ ]Mild [ ]Moderate [ ]Severe    Other Symptoms: Ataxia, Diplopia  [x]All other review of systems negative     Karnofsky Performance Score/Palliative Performance Status Version 2:  50%    [email or web address]/[email or web address]_performance_scale_[email or web address].pdf  PHYSICAL EXAM:  Vital Signs Last 24 Hrs  T(C): 36.9 (03 Sep 2019 14:18), Max: 36.9 (03 Sep 2019 04:50)  T(F): 98.4 (03 Sep 2019 14:18), Max: 98.5 (03 Sep 2019 04:50)  HR: 66 (03 Sep 2019 14:18) (66 - 80)  BP: 102/55 (03 Sep 2019 14:18) (102/55 - 120/74)  BP(mean): --  RR: 17 (03 Sep 2019 14:18) (16 - 17)  SpO2: 97% (03 Sep 2019 14:18) (97% - 100%) I&O's Summary  GENERAL:  [x]Alert  [x]Oriented x3   [ ]Lethargic  [x]Cachexia  [ ]Unarousable  [x]Verbal  [ ]Non-Verbal  Behavioral: Calm  [ ] Anxiety  [ ] Delirium [ ] Agitation [ ] Other  HEENT:  [ ]Normal   x ]Dry mouth   [ ]ET Tube/Trach  [ ]Oral lesions  PULMONARY:   [ ]Clear [ ]Tachypnea  [ ]Audible excessive secretions   [ ]Rhonchi        [ ]Right [ ]Left [ ]Bilateral  [ ]Crackles        [ ]Right [ ]Left [ ]Bilateral  [x]Wheezing     [ ]Right [ ]Left [x]Bilateral  CARDIOVASCULAR:    [x]Regular [ ]Irregular [ ]Tachy  [ ]Bryson [ ]Murmur [ ]Other  GASTROINTESTINAL:  [x]Soft  [ ]Distended   [x]+BS  [ ]Non tender [ ]Tender  [ ]PEG [ ]OGT/ NGT  Last BM: Today  GENITOURINARY:  [x]Normal [ ] Incontinent   [ ]Oliguria/Anuria   [ ]Jimenez  MUSCULOSKELETAL:   [ ]Normal   [x]Weakness  [ ]Bed/Wheelchair bound [ ]Edema  NEUROLOGIC:   [x]No focal deficits, Ataxia  [ ] Cognitive impairment  [ ] Dysphagia [ ]Dysarthria [ ] Paresis [ ]Other   SKIN:   [x]Normal   [ ]Pressure ulcer(s)  [ ]Rash    CRITICAL CARE:  [ ] Shock Present  [ ]Septic [ ]Cardiogenic [ ]Neurologic [ ]Hypovolemic  [ ]  Vasopressors [ ]  Inotropes   [ ] Respiratory failure present [ ] mechanical ventilation [ ] non-invasive ventilatory support [ ] High flow  [ ] Acute  [ ] Chronic [ ] Hypoxic  [ ] Hypercarbic [ ] Other  [ ] Other organ failure     LABS:                        13.4   12.28 )-----------( 229      ( 03 Sep 2019 06:00 )             40.6   09-03    129<L>  |  93<L>  |  20  ----------------------------<  95  4.7   |  22  |  0.71    Ca    8.7      03 Sep 2019 06:00  Phos  3.3     09-03  Mg     2.4     09-03    TPro  6.1  /  Alb  3.5  /  TBili  0.5  /  DBili  x   /  AST  13  /  ALT  45<H>  /  AlkPhos  91  09-03        RADIOLOGY & ADDITIONAL STUDIES: < from: CT Head No Cont (08.28.19 @ 12:01) >  FINDINGS:    There are multiple supra and infratentorial lesions several of which   demonstrate increased rim attenuation, with central decreased attenuation   concerning for metastasis including and not limited to: a high midline   cerebellar vermis lesion measuring 1.8 x 2.8 x 1.8 cm, AP, TR, CC, and   inferior heterogeneous mass effacing the fourth ventricle measuring 2.5 x   2.5 x 2.1 cm, AP, TR, CC, multiple additional lesions noted in the   cerebral hemispheres hemisphere, including one with increased attenuation   in the right frontal lobe measuring 1.3 x 1.0 x 0.92 cm, AP, TR, CC and   left parietal occipital lesion with increased attenuation and fluid fluid   level measuring 1.4 x 1.5 x 1.8 cm, AP, TR, CC, edema and mass effect   with effacement of the sulci. Multiple additional smaller lesions are   noted. MR with gadolinium is more sensitive for full assessment of   intracranial metastatic disease.    There is mild enlargement the ventricles and sulci consistent with volume   loss, there is enlargement and decreased attenuation along the forniceal   columns, (2:14). There is nodular white matter decreased attenuation,   likely microvascular disease, other etiologies not excluded. The   cavernous sinus vasculature is prominent greater than (400:24). There is   no large extra-axial collection, or significant midline shift.    Orbits demonstrate a rightward gaze preference. Paranasal sinuses and   mastoid air cells appear free of disease. The calvarium is intact.    IMPRESSION:    Multiple  lesions concerning for metastasis with hemorrhagic and/or   proteinaceous debris as detailed above, including left parietal occipital   mass with fluid fluid level with  edema and mass effect, MRI with   gadolinium will better assess. Basal cisterns are patent, no extra axial   collection or midline shift. Strongly suggest improved assessment using   MRI with gadolinium.    < end of copied text >  < from: CT Abdomen and Pelvis w/ IV Cont (08.28.19 @ 12:01) >    IMPRESSION:     Emphysema with bilateral solid pulmonary nodules measuring 2.3 cm in the   right upper lobe and 0.7 cm in the left upper lobe. Additional   groundglass and mixed solid and ground glass nodular opacities   bilaterally. Pulmonary nodules are indeterminate, but may be seen in the   setting of a primary lung malignancy. No intrathoracic lymphadenopathy.    No solid organ lesion or lymphadenopathy in the abdomen or pelvis.    < end of copied text >    PROTEIN CALORIE MALNUTRITION PRESENT: [x] Yes [ ] No  [ ] PPSV2 < or = to 30% [x] significant weight loss  [x] poor nutritional intake [ ] catabolic state [ ] anasarca     Albumin, Serum: 3.5 g/dL (09-03-19 @ 06:00)  Artificial Nutrition [ ]     REFERRALS:   [ ]Chaplaincy  [ ] Hospice  [ ]Child Life  [ ]Social Work  [x]Case management [ ]Holistic Therapy HPI:  67 y/o M w/ PMHx of 52 pack-year smoking hx quit 20 years ago presenting with 1-month history of acutely worsening ataxia.  He was seen by a doctor in [facility], was found to have masses in the brain by imaging and [place] the [place] yesterday [place] further work-[place]. He reports having mild ataxia for approximately 1 year, before it acutely worsened approximately 1 month ago. He had a witnessed episode of syncope, without associated shaking or post-ictal state. Since then, he has had severe ataxia whenever he stands up, requiring him to use a cane to walk. The ataxia is somewhat improved after sleeping, otherwise nothing has helped alleviate the symptoms, including Betahistine Dihydrochloride (for vertigo and Meniere’s disease) given in [facility]. He reports the severity of his ataxia has been constant for the past month.     About 2 weeks ago, the patient experienced a sharp, 10/10 headache with 1 episode of non-bloody vomiting. He described the headache as the worst of his life, with associated sensitivity to bright lights and loud sounds. He has had intermitted episodes of headache since then that have not been as severe. He has not vomited since then.  The patient also describes new blurriness in his peripheral vision bilaterally and 1 week of mild tremor. He reports subjective weight loss over the past month, estimated to be ~3kg, as noticed by a decreased arm circumference. No fevers or chills. No shortness of breath, chest pain, cough, hemoptysis, or fatigue. Apart from the single episode of vomiting with his headache, he has had no nausea, vomiting, diarrhea, or constipation. No hearing loss or difficulty chewing. There is some mild dysphagia that has been present for years. In the ED, pt had T 97.7, HT 87, /81, RR 16, SpO2 99%RA.  He had leukocytosis to 19.59 (s/p steroids in [facility]) without bandemia, and transaminitis w/ AST 48, , and alk phos of 126. (28 Aug 2019 18:25)     Patient was started on IV decadron, PPI, Keppra, Zofran, and Tylenol as inpatient. Patient s/p bronchoscopy w/ biopsy awaiting pathology results.     PERTINENT PM/SXH:   No pertinent past medical history    No significant past surgical history    FAMILY HISTORY: Noncontributory     ITEMS NOT CHECKED ARE NOT PRESENT    SOCIAL HISTORY:   Significant other/partner:  [x]  Children:  [x]  Hinduism/Spirituality: Denominational  Substance hx:  [ ]   Tobacco hx:  [x]   Alcohol hx: [ ]   Home Opioid hx:  [ ] I-Stop Reference No:  Living Situation: [x]Home  [ ]Long term care  [ ]Rehab [ ]Other    ADVANCE DIRECTIVES:  No  DNR  MOLST  [ ]  Living Will  [ ]   DECISION MAKER(s):  [ ] Health Care Proxy(s)  [ ] Surrogate(s)  [ ] Guardian           Name(s): Phone Number(s):  Mary Zavaleta (daughter): [phone]    BASELINE (I)ADL(s) (prior to admission): ambulatory  [place]: [x]Total  [ ] Moderate [ ]Dependent    Allergies    No Known Allergies    Intolerances    MEDICATIONS  (STANDING):  dexamethasone  Injectable 4 milliGRAM(s) IV Push every 6 hours  docusate sodium 100 milliGRAM(s) Oral three times a day  enoxaparin Injectable 40 milliGRAM(s) SubCutaneous at bedtime  levETIRAcetam  IVPB 500 milliGRAM(s) IV Intermittent every 12 hours  meclizine 25 milliGRAM(s) Oral two times a day  pantoprazole    Tablet 40 milliGRAM(s) Oral before breakfast  polyethylene glycol 3350 17 Gram(s) Oral two times a day  QUEtiapine 25 milliGRAM(s) Oral at bedtime    MEDICATIONS  (PRN):  acetaminophen   Tablet .. 650 milliGRAM(s) Oral every 6 hours PRN Temp greater or equal to 38C (100.4F), Mild Pain (1 - 3), Moderate Pain (4 - 6)  ALBUTerol/ipratropium for Nebulization 3 milliLiter(s) Nebulizer every 6 hours PRN Shortness of Breath and/or Wheezing  aluminum hydroxide/magnesium hydroxide/simethicone Suspension 30 milliLiter(s) Oral every 6 hours PRN Dyspepsia  LORazepam     Tablet 0.5 milliGRAM(s) Oral once PRN Anxiety  melatonin 3 milliGRAM(s) Oral at bedtime PRN Insomnia  ondansetron Injectable 4 milliGRAM(s) IV Push every 6 hours PRN Nausea    PRESENT SYMPTOMS: [ ]Unable to obtain due to poor mentation   Source if other than patient:  [ ]Family   [ ]Team     Pain: [x] yes [ ] no  QOL impact - minimal   Location - headache, left side                    Aggravating factors - occasionally light  Quality - aching  Radiation - none  Timing - intermittant  Severity (0-10 scale): 3/10  Minimal acceptable level (0-10 scale): 2    PAIN AD Score:     http://geriatrictoolkit.[facility]/cog/painad.pdf (press ctrl +  left click to view)    Dyspnea:                           [ ]Mild [ ]Moderate [ ]Severe  Anxiety:                             [x]Mild [ ]Moderate [ ]Severe  Fatigue:                             [x]Mild [ ]Moderate [ ]Severe  Nausea:                             [ ]Mild [x]Moderate [ ]Severe  Loss of appetite:              [ ]Mild [x]Moderate [ ]Severe  Constipation:                    [ ]Mild [ ]Moderate [ ]Severe    Other Symptoms: Ataxia, Diplopia  [x]All other review of systems negative     Karnofsky Performance Score/Palliative Performance Status Version 2:  50%    http://npcrc.org/files/news/palliative_performance_scale_ppsv2.pdf  PHYSICAL EXAM:  Vital Signs Last 24 Hrs  T(C): 36.9 (03 Sep 2019 14:18), Max: 36.9 (03 Sep 2019 04:50)  T(F): 98.4 (03 Sep 2019 14:18), Max: 98.5 (03 Sep 2019 04:50)  HR: 66 (03 Sep 2019 14:18) (66 - 80)  BP: 102/55 (03 Sep 2019 14:18) (102/55 - 120/74)  BP(mean): --  RR: 17 (03 Sep 2019 14:18) (16 - 17)  SpO2: 97% (03 Sep 2019 14:18) (97% - 100%) I&O's Summary  GENERAL:  [x]Alert  [x]Oriented x3   [ ]Lethargic  [x]Cachexia  [ ]Unarousable  [x]Verbal  [ ]Non-Verbal  Behavioral: Calm  [ ] Anxiety  [ ] Delirium [ ] Agitation [ ] Other  HEENT:  [ ]Normal   x ]Dry mouth   [ ]ET Tube/Trach  [ ]Oral lesions  PULMONARY:   [ ]Clear [ ]Tachypnea  [ ]Audible excessive secretions   [ ]Rhonchi        [ ]Right [ ]Left [ ]Bilateral  [ ]Crackles        [ ]Right [ ]Left [ ]Bilateral  [x]Wheezing     [ ]Right [ ]Left [x]Bilateral  CARDIOVASCULAR:    [x]Regular [ ]Irregular [ ]Tachy  [ ]Bryson [ ]Murmur [ ]Other  GASTROINTESTINAL:  [x]Soft  [ ]Distended   [x]+BS  [ ]Non tender [ ]Tender  [ ]PEG [ ]OGT/ NGT  Last BM: Today  GENITOURINARY:  [x]Normal [ ] Incontinent   [ ]Oliguria/Anuria   [ ]Jimenez  MUSCULOSKELETAL:   [ ]Normal   [x]Weakness  [ ]Bed/Wheelchair bound [ ]Edema  NEUROLOGIC:   [x]No focal deficits, Ataxia  [ ] Cognitive impairment  [ ] Dysphagia [ ]Dysarthria [ ] Paresis [ ]Other   SKIN:   [x]Normal   [ ]Pressure ulcer(s)  [ ]Rash    CRITICAL CARE:  [ ] Shock Present  [ ]Septic [ ]Cardiogenic [ ]Neurologic [ ]Hypovolemic  [ ]  Vasopressors [ ]  Inotropes   [ ] Respiratory failure present [ ] mechanical ventilation [ ] non-invasive ventilatory support [ ] High flow  [ ] Acute  [ ] Chronic [ ] Hypoxic  [ ] Hypercarbic [ ] Other  [ ] Other organ failure     LABS:                        13.4   12.28 )-----------( 229      ( 03 Sep 2019 06:00 )             40.6   09-03    129<L>  |  93<L>  |  20  ----------------------------<  95  4.7   |  22  |  0.71    Ca    8.7      03 Sep 2019 06:00  Phos  3.3     09-03  Mg     2.4     09-03    TPro  6.1  /  Alb  3.5  /  TBili  0.5  /  DBili  x   /  AST  13  /  ALT  45<H>  /  AlkPhos  91  09-03        RADIOLOGY & ADDITIONAL STUDIES: < from: CT Head No Cont (08.28.19 @ 12:01) >  FINDINGS:    There are multiple supra and infratentorial lesions several of which   demonstrate increased rim attenuation, with central decreased attenuation   concerning for metastasis including and not limited to: a high midline   cerebellar vermis lesion measuring 1.8 x 2.8 x 1.8 cm, AP, TR, CC, and   inferior heterogeneous mass effacing the fourth ventricle measuring 2.5 x   2.5 x 2.1 cm, AP, TR, CC, multiple additional lesions noted in the   cerebral hemispheres hemisphere, including one with increased attenuation   in the right frontal lobe measuring 1.3 x 1.0 x 0.92 cm, AP, TR, CC and   left parietal occipital lesion with increased attenuation and fluid fluid   level measuring 1.4 x 1.5 x 1.8 cm, AP, TR, CC, edema and mass effect   with effacement of the sulci. Multiple additional smaller lesions are   noted. MR with gadolinium is more sensitive for full assessment of   intracranial metastatic disease.    There is mild enlargement the ventricles and sulci consistent with volume   loss, there is enlargement and decreased attenuation along the forniceal   columns, (2:14). There is nodular white matter decreased attenuation,   likely microvascular disease, other etiologies not excluded. The   cavernous sinus vasculature is prominent greater than (400:24). There is   no large extra-axial collection, or significant midline shift.    Orbits demonstrate a rightward gaze preference. Paranasal sinuses and   mastoid air cells appear free of disease. The calvarium is intact.    IMPRESSION:    Multiple  lesions concerning for metastasis with hemorrhagic and/or   proteinaceous debris as detailed above, including left parietal occipital   mass with fluid fluid level with  edema and mass effect, MRI with   gadolinium will better assess. Basal cisterns are patent, no extra axial   collection or midline shift. Strongly suggest improved assessment using   MRI with gadolinium.    < end of copied text >  < from: CT Abdomen and Pelvis w/ IV Cont (08.28.19 @ 12:01) >    IMPRESSION:     Emphysema with bilateral solid pulmonary nodules measuring 2.3 cm in the   right upper lobe and 0.7 cm in the left upper lobe. Additional   groundglass and mixed solid and ground glass nodular opacities   bilaterally. Pulmonary nodules are indeterminate, but may be seen in the   setting of a primary lung malignancy. No intrathoracic lymphadenopathy.    No solid organ lesion or lymphadenopathy in the abdomen or pelvis.    < end of copied text >    PROTEIN CALORIE MALNUTRITION PRESENT: [x] Yes [ ] No  [ ] PPSV2 < or = to 30% [x] significant weight loss  [x] poor nutritional intake [ ] catabolic state [ ] anasarca     Albumin, Serum: 3.5 g/dL (09-03-19 @ 06:00)  Artificial Nutrition [ ]     REFERRALS:   [ ]Chaplaincy  [ ] Hospice  [ ]Child Life  [ ]Social Work  [x]Case management [ ]Holistic Therapy

## 2019-09-04 LAB
ALBUMIN SERPL ELPH-MCNC: 3.8 G/DL — SIGNIFICANT CHANGE UP (ref 3.3–5)
ALP SERPL-CCNC: 86 U/L — SIGNIFICANT CHANGE UP (ref 40–120)
ALT FLD-CCNC: 38 U/L — SIGNIFICANT CHANGE UP (ref 4–41)
ANION GAP SERPL CALC-SCNC: 13 MMO/L — SIGNIFICANT CHANGE UP (ref 7–14)
AST SERPL-CCNC: 11 U/L — SIGNIFICANT CHANGE UP (ref 4–40)
BILIRUB SERPL-MCNC: 0.5 MG/DL — SIGNIFICANT CHANGE UP (ref 0.2–1.2)
BUN SERPL-MCNC: 19 MG/DL — SIGNIFICANT CHANGE UP (ref 7–23)
CALCIUM SERPL-MCNC: 8.7 MG/DL — SIGNIFICANT CHANGE UP (ref 8.4–10.5)
CHLORIDE SERPL-SCNC: 95 MMOL/L — LOW (ref 98–107)
CO2 SERPL-SCNC: 23 MMOL/L — SIGNIFICANT CHANGE UP (ref 22–31)
CREAT SERPL-MCNC: 0.65 MG/DL — SIGNIFICANT CHANGE UP (ref 0.5–1.3)
GLUCOSE SERPL-MCNC: 144 MG/DL — HIGH (ref 70–99)
HCT VFR BLD CALC: 40.6 % — SIGNIFICANT CHANGE UP (ref 39–50)
HGB BLD-MCNC: 13.7 G/DL — SIGNIFICANT CHANGE UP (ref 13–17)
MAGNESIUM SERPL-MCNC: 2.5 MG/DL — SIGNIFICANT CHANGE UP (ref 1.6–2.6)
MCHC RBC-ENTMCNC: 28.8 PG — SIGNIFICANT CHANGE UP (ref 27–34)
MCHC RBC-ENTMCNC: 33.7 % — SIGNIFICANT CHANGE UP (ref 32–36)
MCV RBC AUTO: 85.5 FL — SIGNIFICANT CHANGE UP (ref 80–100)
NON-GYNECOLOGICAL CYTOLOGY STUDY: SIGNIFICANT CHANGE UP
NON-GYNECOLOGICAL CYTOLOGY STUDY: SIGNIFICANT CHANGE UP
NRBC # FLD: 0 K/UL — SIGNIFICANT CHANGE UP (ref 0–0)
PHOSPHATE SERPL-MCNC: 2.9 MG/DL — SIGNIFICANT CHANGE UP (ref 2.5–4.5)
PLATELET # BLD AUTO: 252 K/UL — SIGNIFICANT CHANGE UP (ref 150–400)
PMV BLD: 10.4 FL — SIGNIFICANT CHANGE UP (ref 7–13)
POTASSIUM SERPL-MCNC: 4.4 MMOL/L — SIGNIFICANT CHANGE UP (ref 3.5–5.3)
POTASSIUM SERPL-SCNC: 4.4 MMOL/L — SIGNIFICANT CHANGE UP (ref 3.5–5.3)
PROT SERPL-MCNC: 6.5 G/DL — SIGNIFICANT CHANGE UP (ref 6–8.3)
RBC # BLD: 4.75 M/UL — SIGNIFICANT CHANGE UP (ref 4.2–5.8)
RBC # FLD: 12.5 % — SIGNIFICANT CHANGE UP (ref 10.3–14.5)
SODIUM SERPL-SCNC: 131 MMOL/L — LOW (ref 135–145)
WBC # BLD: 9.97 K/UL — SIGNIFICANT CHANGE UP (ref 3.8–10.5)
WBC # FLD AUTO: 9.97 K/UL — SIGNIFICANT CHANGE UP (ref 3.8–10.5)

## 2019-09-04 PROCEDURE — 99233 SBSQ HOSP IP/OBS HIGH 50: CPT | Mod: GC

## 2019-09-04 PROCEDURE — 99232 SBSQ HOSP IP/OBS MODERATE 35: CPT | Mod: GC

## 2019-09-04 RX ORDER — DEXAMETHASONE 0.5 MG/5ML
4 ELIXIR ORAL EVERY 6 HOURS
Refills: 0 | Status: DISCONTINUED | OUTPATIENT
Start: 2019-09-04 | End: 2019-09-06

## 2019-09-04 RX ADMIN — Medication 25 MILLIGRAM(S): at 18:08

## 2019-09-04 RX ADMIN — Medication 4 MILLIGRAM(S): at 05:57

## 2019-09-04 RX ADMIN — LEVETIRACETAM 400 MILLIGRAM(S): 250 TABLET, FILM COATED ORAL at 05:57

## 2019-09-04 RX ADMIN — Medication 4 MILLIGRAM(S): at 12:28

## 2019-09-04 RX ADMIN — Medication 100 MILLIGRAM(S): at 21:49

## 2019-09-04 RX ADMIN — Medication 25 MILLIGRAM(S): at 05:57

## 2019-09-04 RX ADMIN — Medication 100 MILLIGRAM(S): at 13:51

## 2019-09-04 RX ADMIN — Medication 4 MILLIGRAM(S): at 18:12

## 2019-09-04 RX ADMIN — ENOXAPARIN SODIUM 40 MILLIGRAM(S): 100 INJECTION SUBCUTANEOUS at 21:49

## 2019-09-04 RX ADMIN — Medication 4 MILLIGRAM(S): at 23:58

## 2019-09-04 RX ADMIN — PANTOPRAZOLE SODIUM 40 MILLIGRAM(S): 20 TABLET, DELAYED RELEASE ORAL at 06:00

## 2019-09-04 RX ADMIN — LEVETIRACETAM 400 MILLIGRAM(S): 250 TABLET, FILM COATED ORAL at 18:08

## 2019-09-04 RX ADMIN — POLYETHYLENE GLYCOL 3350 17 GRAM(S): 17 POWDER, FOR SOLUTION ORAL at 18:08

## 2019-09-04 RX ADMIN — QUETIAPINE FUMARATE 25 MILLIGRAM(S): 200 TABLET, FILM COATED ORAL at 21:49

## 2019-09-04 RX ADMIN — Medication 4 MILLIGRAM(S): at 00:59

## 2019-09-04 NOTE — PROGRESS NOTE ADULT - ATTENDING COMMENTS
The patient was seen and examined today with the fellow, Dr. Robison, and I agree with the History, Physical Exam and Plan in the record. Labs and imaging reviewed by me.

## 2019-09-04 NOTE — PROVIDER CONTACT NOTE (OTHER) - RECOMMENDATIONS
Notify MD to come assess patient, complete a full neuro. check, encourage patient to use the call bell and to remain in bed until assessed, continue to monitor for any sudden neuro. changes.

## 2019-09-04 NOTE — PROGRESS NOTE ADULT - PROBLEM SELECTOR PLAN 1
Most likely 2/2 metastatic lesions in the brain; multiple supra and infratentorial lesions concerning for metastasis including and not limited to: a high midline   cerebellar vermis lesion and inferior heterogeneous mass effacing the fourth ventricle, mass effect and edema. Ataxia is improving and patient is ambulating to bathroom with assistance.    -May switch Decadron to po 4 mg q6hr  -C/w Protonix 40 mg po daily   -C/w Meclazine   -Fall precautions

## 2019-09-04 NOTE — PROGRESS NOTE ADULT - SUBJECTIVE AND OBJECTIVE BOX
INTERVAL HPI/OVERNIGHT EVENTS:  Patient S&E at bedside. INCOMPLETE    MEDICATIONS  (STANDING):  dexamethasone  Injectable 4 milliGRAM(s) IV Push every 6 hours  docusate sodium 100 milliGRAM(s) Oral three times a day  enoxaparin Injectable 40 milliGRAM(s) SubCutaneous at bedtime  levETIRAcetam  IVPB 500 milliGRAM(s) IV Intermittent every 12 hours  meclizine 25 milliGRAM(s) Oral two times a day  pantoprazole    Tablet 40 milliGRAM(s) Oral before breakfast  polyethylene glycol 3350 17 Gram(s) Oral two times a day  QUEtiapine 25 milliGRAM(s) Oral at bedtime    MEDICATIONS  (PRN):  acetaminophen   Tablet .. 650 milliGRAM(s) Oral every 6 hours PRN Temp greater or equal to 38C (100.4F), Mild Pain (1 - 3), Moderate Pain (4 - 6)  ALBUTerol/ipratropium for Nebulization 3 milliLiter(s) Nebulizer every 6 hours PRN Shortness of Breath and/or Wheezing  aluminum hydroxide/magnesium hydroxide/simethicone Suspension 30 milliLiter(s) Oral every 6 hours PRN Dyspepsia  LORazepam     Tablet 0.5 milliGRAM(s) Oral once PRN Anxiety  melatonin 3 milliGRAM(s) Oral at bedtime PRN Insomnia  ondansetron Injectable 4 milliGRAM(s) IV Push every 6 hours PRN Nausea    Allergies    No Known Allergies    Intolerances    VITAL SIGNS:  T(F): 98.6 (09-04-19 @ 05:49)  HR: 70 (09-04-19 @ 05:49)  BP: 108/71 (09-04-19 @ 05:49)  RR: 16 (09-04-19 @ 05:49)  SpO2: 97% (09-04-19 @ 05:49)  Wt(kg): --    PHYSICAL EXAM: INCOMPLETE    Constitutional: NAD  Eyes: EOMI, sclera non-icteric  Neck: supple  Respiratory: CTAB  Cardiovascular: RRR, S1/S2  Gastrointestinal: +BS, soft, NTND  Extremities: no LE edema  Neurological: AAOx3    LABS:                        13.7   9.97  )-----------( 252      ( 04 Sep 2019 07:15 )             40.6     09-04    131<L>  |  95<L>  |  19  ----------------------------<  144<H>  4.4   |  23  |  0.65    Ca    8.7      04 Sep 2019 07:15  Phos  2.9     09-04  Mg     2.5     09-04    TPro  6.5  /  Alb  3.8  /  TBili  0.5  /  DBili  x   /  AST  11  /  ALT  38  /  AlkPhos  86  09-04    RADIOLOGY & ADDITIONAL TESTS:  Studies reviewed. INTERVAL HPI/OVERNIGHT EVENTS:  Patient S&E at bedside. Reports dizziness and vertigo is better after increased dose of steroids. Ambulated to the bathroom with assistance. Still has blurry vision.      MEDICATIONS  (STANDING):  dexamethasone  Injectable 4 milliGRAM(s) IV Push every 6 hours  docusate sodium 100 milliGRAM(s) Oral three times a day  enoxaparin Injectable 40 milliGRAM(s) SubCutaneous at bedtime  levETIRAcetam  IVPB 500 milliGRAM(s) IV Intermittent every 12 hours  meclizine 25 milliGRAM(s) Oral two times a day  pantoprazole    Tablet 40 milliGRAM(s) Oral before breakfast  polyethylene glycol 3350 17 Gram(s) Oral two times a day  QUEtiapine 25 milliGRAM(s) Oral at bedtime    MEDICATIONS  (PRN):  acetaminophen   Tablet .. 650 milliGRAM(s) Oral every 6 hours PRN Temp greater or equal to 38C (100.4F), Mild Pain (1 - 3), Moderate Pain (4 - 6)  ALBUTerol/ipratropium for Nebulization 3 milliLiter(s) Nebulizer every 6 hours PRN Shortness of Breath and/or Wheezing  aluminum hydroxide/magnesium hydroxide/simethicone Suspension 30 milliLiter(s) Oral every 6 hours PRN Dyspepsia  LORazepam     Tablet 0.5 milliGRAM(s) Oral once PRN Anxiety  melatonin 3 milliGRAM(s) Oral at bedtime PRN Insomnia  ondansetron Injectable 4 milliGRAM(s) IV Push every 6 hours PRN Nausea    Allergies    No Known Allergies    Intolerances    VITAL SIGNS:  T(F): 98.6 (09-04-19 @ 05:49)  HR: 70 (09-04-19 @ 05:49)  BP: 108/71 (09-04-19 @ 05:49)  RR: 16 (09-04-19 @ 05:49)  SpO2: 97% (09-04-19 @ 05:49)  Wt(kg): --    PHYSICAL EXAM:    Constitutional: NAD  Eyes: EOMI, sclera non-icteric  Neck: supple  Respiratory: CTAB  Cardiovascular: RRR, S1/S2  Gastrointestinal: +BS, soft, NTND  Extremities: no LE edema  Neurological: AAOx3    LABS:                        13.7   9.97  )-----------( 252      ( 04 Sep 2019 07:15 )             40.6     09-04    131<L>  |  95<L>  |  19  ----------------------------<  144<H>  4.4   |  23  |  0.65    Ca    8.7      04 Sep 2019 07:15  Phos  2.9     09-04  Mg     2.5     09-04    TPro  6.5  /  Alb  3.8  /  TBili  0.5  /  DBili  x   /  AST  11  /  ALT  38  /  AlkPhos  86  09-04    RADIOLOGY & ADDITIONAL TESTS:  Studies reviewed.

## 2019-09-04 NOTE — PROGRESS NOTE ADULT - ATTENDING COMMENTS
Patient seen and examined. Symptoms of vertigo improved, but pt with intermittent psychosis, which pt's daughter confirms was an issue prior to current hospitalization. Brain lesions and steroids likely contributing to hallucinations.   Mr. Daugherty is a 67 yo man with symptoms of vertigo and ataxia 2/2 to metastatic lesions to the brain, now confirmed to have non-small cell adenocarcinoma of the lung s/p EMNB-guided bx and FNA of the RUL.   - f/u with heme/onc regarding possible treatment options and/or prognosis  - f/u palliative care recs  - will discuss with oncology and pall care dosing of steroids, since it may be exacerbating hallucinations  - noted improvement in hyponatremia, which likely 2/2 SIADH. Continue fluid restriction Patient seen and examined. Symptoms of vertigo improved, but pt with intermittent psychosis, which pt's daughter confirms was an issue prior to current hospitalization. Brain lesions and steroids likely contributing to hallucinations.   Mr. Daugherty is a 69 yo man with symptoms of vertigo and ataxia 2/2 to metastatic lesions to the brain, now confirmed to have non-small cell adenocarcinoma of the lung s/p EMNB-guided bx and FNA of the RUL.   - f/u with heme/onc regarding possible treatment options and/or prognosis  - f/u palliative care recs  - will discuss with oncology and pall care dosing of steroids, since it may be exacerbating hallucinations  - noted improvement in hyponatremia, which likely 2/2 SIADH. Continue fluid restriction  - advance diet as tolerated  Remainder of plan as discussed above

## 2019-09-04 NOTE — PROGRESS NOTE ADULT - PROBLEM SELECTOR PLAN 5
Patient seen with family at bedside. HCP documentation not yet complete. Patient has been living with daughter and her family in [place] on and off. Reports his physical condition has been deteriorating and symptoms have been worsening for the past year. Patient remains full code. Patient will establish care at [facility] and be evaluated for Gamma Knife SRS. GOC are established.     -F/u on HCP documentation.

## 2019-09-04 NOTE — PROGRESS NOTE ADULT - PROBLEM SELECTOR PLAN 3
52 pack/year smoking history, CT reveals emphysema and b/l pulmonary nodules in RUL and REINALDO, s/p bronchoscopy & biopsy. Pathology report c/w histologic findings of NSCLC adenocarinoma. Patient will establish care at [facility] upon discharge.

## 2019-09-04 NOTE — CHART NOTE - NSCHARTNOTEFT_GEN_A_CORE
MRi reviewed- I would suggest outpatient Gamma Knife SRS with my colleague Dr Dima Poole (Rad. Medicine office  ), not inpt. whole brain radiation-  Maurilio Mcknight MD  [facility]

## 2019-09-04 NOTE — PROGRESS NOTE ADULT - ASSESSMENT
69 y/o M from [facility] w/ 52 year/pack smoking history, found to have suspected primary lung cancer with metastasis to brain, s/p bronchoscopy and biopsy pending pathology, c/o progressive ataxia, changes in vision, nausea/vomting, and weight loss. Palliative care consulted for symptom management and GOC.                  Problem/Recommendation - 4:  ·  Problem: Brain metastases.  Recommendation:     Problem/Recommendation - 5:  ·  Problem: Palliative care encounter.  Recommendation: Patient seen with family at bedside. HCP documentation left bedside. Patient has been living with daughter and her family in [place] on and off. Reports his physical condition has been deteriorating and symptoms have been worsening for the part year. Family is awaiting pathology results and oncology input prior to discussing further goals. Patient remains full code. Will continue to follow for symptom management and on-going GOC discussion.

## 2019-09-04 NOTE — PROGRESS NOTE ADULT - PROBLEM SELECTOR PLAN 6
Na 135 at presentation with iggy of 128 to date  - ddx includes poor PO intake vs. central SIADH 2/2 to brain lesions vs. paraneoplastic syndrome  - hyponatremia did not respond to fluids over 2 days  - Erick 96 and UOsm 430 suggest element of cerebral salt wasting  - will begin fluid restrictions and repeat BMP this PM Na 135 at presentation with iggy of 128 to date  - ddx includes poor PO intake vs. central SIADH 2/2 to brain lesions vs. paraneoplastic syndrome  - hyponatremia did not respond to fluids over 2 days  - Erick 96 and UOsm 430 suggest element of SIADH  - will begin fluid restrictions and repeat BMP this PM

## 2019-09-04 NOTE — PROGRESS NOTE ADULT - PROBLEM SELECTOR PLAN 2
Most likely 2/2 metastatic lesions in brain. Reports nausea has improved.    -May switch to Zofran 4 mg ODT q6hr prn nausea/vomiting.  -May continue with Haldol 0.5 mg IVP prn nausea if refractory.

## 2019-09-04 NOTE — PROGRESS NOTE ADULT - ASSESSMENT
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  S/p bronchoscopy, pending tissue biopsy results.  Onc following, recs appreciated. Course c/b continued nausea/vertigo, but that symptoms improved with increased steroid suggests vasogenic edema as possible cause. 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  S/p bronchoscopy, biopsy suggest lung adenocarcinoma.  Course c/b continued nausea/vertigo, but that symptoms improved with increased steroid suggests vasogenic edema as possible cause.  Pt is not a candidate for surgery but possibly radiation +/- chemo; onc following and Rad Onc consulted

## 2019-09-04 NOTE — PROGRESS NOTE ADULT - PROBLEM SELECTOR PLAN 4
CT revealed multiple supra and infratentorial lesions; a high midline   cerebellar vermis lesion measuring 1.8 x 2.8 x 1.8 cm, and inferior heterogeneous mass effacing the fourth ventricle measuring 2.5 x 2.5 x 2.1 cm, multiple additional lesions noted in the cerebral hemispheres hemisphere, including one in the right frontal lobe measuring 1.3 x 1.0 x 0.92 cm, left parietal occipital lesion with increased attenuation and fluid level measuring 1.4 x 1.5 x 1.8 cm, edema and mass effect with effacement of the sulci. Multiple additional smaller lesions are   noted. Gamma Knife SRS recommended as outpatient, not inpatient whole brain radiation.     -Change IV to PO; Decadron 4 mg PO q6hr   -C/w Protonix 40 mg po daily (consider q12hr)  -C/w Tylenol prn headache  -Consider Oxycodone 10 mg IR po prn moderate-severe pain  -C/w Keppra

## 2019-09-04 NOTE — PROVIDER CONTACT NOTE (OTHER) - ACTION/TREATMENT ORDERED:
MD notified, neuro. check completed, safety education and use of the call bell system done with patient and family, continue to monitor.

## 2019-09-04 NOTE — PROGRESS NOTE ADULT - ASSESSMENT
67 yo M with no reported medical history, former smoker (52 pack years, quit 20 yrs ago) p/w one month history of worsening ataxia found to have multiple brain lesions with vasogenic edema and b/l spiculated lung nodules s/p bronchoscopy with biopsy on 8/29, pathology results consistent with NSCLC adenocarcinoma histology    1. Metastatic lung adenocarcinoma:  - CT chest with b/l spiculated lung nodules  - CTAP with no mets  - MRI brain with multiple intraparenchymal enhancing masses with associated hemorrhage and/or vasogenic edema  - seen by neurosurgery and pt currently on keppra and steroids  - recommend Rad Onc eval  - further treatment plan with chemo/immunotherapy vs targeted treatment to be determined pending molecular studies    INCOMPLETE 67 yo M with no reported medical history, former smoker (52 pack years, quit 20 yrs ago) p/w one month history of worsening ataxia found to have multiple brain lesions with vasogenic edema and b/l spiculated lung nodules s/p bronchoscopy with biopsy on 8/29, pathology results consistent with NSCLC adenocarcinoma histology    1. Metastatic lung adenocarcinoma:  - CT chest with b/l spiculated lung nodules  - CTAP with no mets  - MRI brain with multiple intraparenchymal enhancing masses with associated hemorrhage and/or vasogenic edema  - seen by neurosurgery and pt currently on keppra and steroids  - recommend Rad Onc eval  - further treatment plan with chemo/immunotherapy vs targeted treatment to be determined pending molecular studies  - discussed with patient and his daughter  - will need to establish care at [facility] and will make a referral after discharge    Lauren Robison, PGY-6  Hematology-Oncology Fellow  Pager: [phone] ([facility]), [phone] ([facility])  (After 5 pm or on weekends please page the on-call fellow) 69 yo M with no reported medical history, former smoker (52 pack years, quit 20 yrs ago) p/w one month history of worsening ataxia found to have multiple brain lesions with vasogenic edema and b/l spiculated lung nodules s/p bronchoscopy with biopsy on 8/29, pathology results consistent with NSCLC adenocarcinoma histology    1. Metastatic lung adenocarcinoma:  - CT chest with b/l spiculated lung nodules s/p bronch/EBUS  - CTAP with no mets  - MRI brain with multiple intraparenchymal enhancing masses with associated hemorrhage and/or vasogenic edema  - seen by neurosurgery and pt currently on keppra and steroids  - recommend Rad Onc eval prior to discharge; would consider inpatient WBRT given symptoms  - would consult ophthalmology for evaluation/complete exam given general blurry vision and known malignancy  - further treatment plan with chemo/immunotherapy vs targeted treatment to be determined pending molecular studies from biopsy specimen  - discussed with patient and his daughter  - will need to establish care at [facility] and will make a referral after discharge    Lauren Robison, PGY-6  Hematology-Oncology Fellow  Pager: [phone] ([facility]), [phone] ([facility])  (After 5 pm or on weekends please page the on-call fellow)

## 2019-09-04 NOTE — CHART NOTE - NSCHARTNOTEFT_GEN_A_CORE
Reached out to pathologist (Dr. Yin) regarding any information from biopsy. Still awaiting results of immunostains to classify malignancy. Will follow up once biopsy results available.

## 2019-09-04 NOTE — PROGRESS NOTE ADULT - ATTENDING COMMENTS
Pt seen with fellow.  Agree with above.  Improved symptoms.  Change iv to oral decadron, protonix and zofran.  Follow up at [facility] for further dmt

## 2019-09-04 NOTE — PROGRESS NOTE ADULT - SUBJECTIVE AND OBJECTIVE BOX
Magan Potter M.D.  PGY1  [id number] / [id number]    68y M w/ unknown hx a/f metastatic dx to the brain from suspected lung primary s/p bronch pending bx results    Interval Hx / subjective: c/o images being upsidedown close to midnight after seroquel and melatonin at 22:00; neuro exam by NF found that pt was disoriented to place but otherwise AAOx3; no intervention    ROS:  CON: dizziness, lightheadedness; NO fever, chills  HEENT: continued change to vision; denied changes to smell, hearing, taste; denied throat pain, chest pain  CV: denied chest pain, racing heart, skipped beats  RESP: denied SOB, cough, wheezing  GI: nausea, vomiting; denied heartburn, diarrhea, constipation, dark or bloody stool  : denied flank pain; pain or burning with urination; bloody urine  MSK: left lower leg pain now less than previously, no swelling; denied muscle ache  ENDO: denied heat or cold intolerance  NEURO: endorsed trouble finding words; denied headache, slurred speech; tingling, numbness  SKIN: denied itching, rash, other skin changes    MEDICATIONS  (STANDING):  dexamethasone  Injectable 4 milliGRAM(s) IV Push every 6 hours  docusate sodium 100 milliGRAM(s) Oral three times a day  enoxaparin Injectable 40 milliGRAM(s) SubCutaneous at bedtime  levETIRAcetam  IVPB 500 milliGRAM(s) IV Intermittent every 12 hours  meclizine 25 milliGRAM(s) Oral two times a day  pantoprazole    Tablet 40 milliGRAM(s) Oral before breakfast  polyethylene glycol 3350 17 Gram(s) Oral two times a day  QUEtiapine 25 milliGRAM(s) Oral at bedtime    MEDICATIONS  (PRN):  acetaminophen   Tablet .. 650 milliGRAM(s) Oral every 6 hours PRN Temp greater or equal to 38C (100.4F), Mild Pain (1 - 3), Moderate Pain (4 - 6)  ALBUTerol/ipratropium for Nebulization 3 milliLiter(s) Nebulizer every 6 hours PRN Shortness of Breath and/or Wheezing  aluminum hydroxide/magnesium hydroxide/simethicone Suspension 30 milliLiter(s) Oral every 6 hours PRN Dyspepsia  LORazepam     Tablet 0.5 milliGRAM(s) Oral once PRN Anxiety  melatonin 3 milliGRAM(s) Oral at bedtime PRN Insomnia  ondansetron Injectable 4 milliGRAM(s) IV Push every 6 hours PRN Nausea    Objective:  Vital Signs Last 24 Hrs  T(C): 37 (04 Sep 2019 05:49), Max: 37 (04 Sep 2019 05:49)  T(F): 98.6 (04 Sep 2019 05:49), Max: 98.6 (04 Sep 2019 05:49)  HR: 70 (04 Sep 2019 05:49) (63 - 72)  BP: 108/71 (04 Sep 2019 05:49) (102/55 - 124/79)  BP(mean): --  RR: 16 (04 Sep 2019 05:49) (16 - 17)  SpO2: 97% (04 Sep 2019 05:49) (97% - 98%)    Physical Exam:  GEN: NAD, less active this morning  HEENT: PERRL, EOMI though limited because it provokes vertigo; mucous dry; neck gaunt, without LAD  CV: RRR, S1, S2; no M/R/G; good capillary refill  PULM: LCTAB; not using accessory muscles  ABD: normal bowel sounds; non-distended, soft; non-tender; no rebound, no guarding  Extremities: no clubbing, cyanosis; no edema; DP and radial pulses palpable    NEURO EXAM  - Mental Status:  AAOx3; speech is fluent   - Cranial Nerves II-XII:    II:  PERRLA; visual fields are full to confrontation  III, IV, VI:  EOMI, no nystagmus, but limited due to provocation of vertigo  V:  facial sensation is intact in the V1-V3 distribution bilaterally.  VII:  face is symmetric with normal eye closure and smile  VIII:  hearing is intact to finger rub  IX, X:  uvula is midline and soft palate rises symmetrically  XI:  head turning and shoulder shrug are intact bilaterally  XII:  tongue protrudes in the midline  - Motor:  right bicep, tricep 4/5; strength is 5/5 throughout the rest of the MSK; decreased muscle bulk and tone throughout; no pronator drift  - Sensory:  intact to light touch and joint-position sense throughout  - Coordination:  finger-nose-finger and heel-knee-shin intact without dysmetria but provokes nausea/vomiting/vertigo; unable to complete rapid alternating hand movements  - Gait: pt too dizzy to walk    LABS TO BE DRAWN at 8:00     BRONCHOSCOPY RESULTS PENDING    Imaging personally reviewed: CT head, no interval change    < from: CT Head No Cont (09.02.19 @ 09:28) >  EXAM:  CT BRAIN        PROCEDURE DATE:  Sep  2 2019     INTERPRETATION:  Clinical indications: Follow-up brain lesions.    Multiple axial sections were performed from base of skull to vertex   without contrast enhancement. Coronal and sagittal reconstructions were   performed as well    Abnormal high attenuated lesions involving the posterior fossa and   supratentorial region are again identified. Overall these findings appear   unchanged in size and configuration and demonstrate some surrounding   edema. No significant shift or herniation is seen.    Evaluation of the osseous structures with the appropriate window appears   unremarkable    The visualized paranasal sinuses mastoid and middle ear regions appear   clear.    Impression: Nosignificant change when allowing for differences in   technique.    < end of copied text >    palliative and nutrition recs reviewed by team Magan Potter M.D.  PGY1  [phone] / [id number]    68y M w/ unknown hx a/f metastatic dx to the brain from suspected lung primary s/p bronch pending bx results    Interval Hx / subjective: c/o images being upsidedown close to midnight after seroquel and melatonin at 22:00; neuro exam by NF found that pt was disoriented to place but otherwise AAOx3; no intervention    ROS:  CON: dizziness, lightheadedness; NO fever, chills  HEENT: continued change to vision; denied changes to smell, hearing, taste; denied throat pain, chest pain  CV: denied chest pain, racing heart, skipped beats  RESP: denied SOB, cough, wheezing  GI: nausea, vomiting; denied heartburn, diarrhea, constipation, dark or bloody stool  : denied flank pain; pain or burning with urination; bloody urine  MSK: left lower leg pain now less than previously, no swelling; denied muscle ache  ENDO: denied heat or cold intolerance  NEURO: endorsed trouble finding words; denied headache, slurred speech; tingling, numbness  SKIN: denied itching, rash, other skin changes    MEDICATIONS  (STANDING):  dexamethasone  Injectable 4 milliGRAM(s) IV Push every 6 hours  docusate sodium 100 milliGRAM(s) Oral three times a day  enoxaparin Injectable 40 milliGRAM(s) SubCutaneous at bedtime  levETIRAcetam  IVPB 500 milliGRAM(s) IV Intermittent every 12 hours  meclizine 25 milliGRAM(s) Oral two times a day  pantoprazole    Tablet 40 milliGRAM(s) Oral before breakfast  polyethylene glycol 3350 17 Gram(s) Oral two times a day  QUEtiapine 25 milliGRAM(s) Oral at bedtime    MEDICATIONS  (PRN):  acetaminophen   Tablet .. 650 milliGRAM(s) Oral every 6 hours PRN Temp greater or equal to 38C (100.4F), Mild Pain (1 - 3), Moderate Pain (4 - 6)  ALBUTerol/ipratropium for Nebulization 3 milliLiter(s) Nebulizer every 6 hours PRN Shortness of Breath and/or Wheezing  aluminum hydroxide/magnesium hydroxide/simethicone Suspension 30 milliLiter(s) Oral every 6 hours PRN Dyspepsia  LORazepam     Tablet 0.5 milliGRAM(s) Oral once PRN Anxiety  melatonin 3 milliGRAM(s) Oral at bedtime PRN Insomnia  ondansetron Injectable 4 milliGRAM(s) IV Push every 6 hours PRN Nausea    Objective:  Vital Signs Last 24 Hrs  T(C): 37 (04 Sep 2019 05:49), Max: 37 (04 Sep 2019 05:49)  T(F): 98.6 (04 Sep 2019 05:49), Max: 98.6 (04 Sep 2019 05:49)  HR: 70 (04 Sep 2019 05:49) (63 - 72)  BP: 108/71 (04 Sep 2019 05:49) (102/55 - 124/79)  BP(mean): --  RR: 16 (04 Sep 2019 05:49) (16 - 17)  SpO2: 97% (04 Sep 2019 05:49) (97% - 98%)    Physical Exam:  GEN: NAD, less active this morning  HEENT: PERRL, EOMI though limited because it provokes vertigo; mucous dry; neck gaunt, without LAD  CV: RRR, S1, S2; no M/R/G; good capillary refill  PULM: LCTAB; not using accessory muscles  ABD: normal bowel sounds; non-distended, soft; non-tender; no rebound, no guarding  Extremities: no clubbing, cyanosis; no edema; DP and radial pulses palpable    NEURO EXAM  - Mental Status:  AAOx3; speech is fluent   - Cranial Nerves II-XII:    II:  PERRLA; visual fields are full to confrontation  III, IV, VI:  EOMI, but limited due to provocation of vertigo  V:  facial sensation is intact in the V1-V3 distribution bilaterally  VII:  face is symmetric with normal eye closure and baring of teeth  VIII:  hearing is intact to finger rub  IX, X:  uvula is midline and soft palate rises symmetrically  XI:  head turning and shoulder shrug are intact bilaterally  XII:  tongue protrudes in the midline  - Motor:  right bicep, tricep 4/5; strength is 5/5 throughout the rest of the MSK; decreased muscle bulk and tone throughout; no pronator drift  - Sensory:  intact to light touch and joint-position sense throughout  - Coordination:  finger-nose-finger and heel-knee-shin intact without dysmetria but provokes nausea/vomiting/vertigo  - Gait: pt too dizzy to walk but was able to transfer from bed to chair                          13.7   9.97  )-----------( 252      ( 04 Sep 2019 07:15 )             40.6   09-04    131<L>  |  95<L>  |  19  ----------------------------<  144<H>  4.4   |  23  |  0.65    Ca    8.7      04 Sep 2019 07:15  Phos  2.9     09-04  Mg     2.5     09-04    TPro  6.5  /  Alb  3.8  /  TBili  0.5  /  DBili  x   /  AST  11  /  ALT  38  /  AlkPhos  86  09-04    Cytopathology - Non Gyn Report (08.29.19 @ 18:04)    Cytopathology - Non Gyn Report:   ACCESSION No:  [id number]    MD ARLIN CARABALLO               3    Cytopathology Report    Final Diagnosis  LUNG, RIGHT UPPER LOBE, EMNB-GUIDED BIOPSY AND FNA  POSITIVE FOR MALIGNANT CELLS.  Non-small cell carcinoma, favor adenocarcinoma  Cytology slides reveal a cellular specimen composed of some  syncytial, crowded groups and predominantly single-lying  neoplastic cells displaying monotony, mild enlargement, nuclear  hyperchromasia, moderate amount of cytoplasm. The biopsy,cell  block and touch prep material is scant. 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 An attempt will be made to obtain  molecular studies on the biopsy material,(block 1B), an addendum  will follow. Clinical, pathologic, and radiologic correlation is  essential.    Immunohistochemistry: The neoplastic cells are positive for TTF-1  and negative for synaptophysin and chromogranin (background  staining) supporting a diagnosis of pulmonary adenocarcinoma.  Please also refer to specimen number  [id number]  This case was reviewed for  with staff  cytopathologists who concur(s) with the diagnosis on 09/04/19.  Slide(s) with built in immunohistochemical study control(s) and  negative control associated with this case has/have been verified  by the sign out pathologist.    For slide(s) without built in controls positive control slides  has/have been reviewed and approved by immunohistochemistry lab  These immunohistochemical/ in-situ hybridization tests have been  developed and their performance characteristics determined by  [facility], Department of Pathology,  Division of Immunopathology, [address] [address].  It has not been cleared or approved by the U.[place]. Food  and Drug Administration.  The FDA has determined that such  clearance or approval is not necessary.  This test is used for  clinical purposes.  The laboratory is certified under the CLIA-88  as qualified to perform high complexity clinical testing.    Imaging personally reviewed: CT head, no interval change    < from: CT Head No Cont (09.02.19 @ 09:28) >  EXAM:  CT BRAIN        PROCEDURE DATE:  Sep  2 2019     INTERPRETATION:  Clinical indications: Follow-up brain lesions.    Multiple axial sections were performed from base of skull to vertex   without contrast enhancement. Coronal and sagittal reconstructions were   performed as well    Abnormal high attenuated lesions involving the posterior fossa and   supratentorial region are again identified. Overall these findings appear   unchanged in size and configuration and demonstrate some surrounding   edema. No significant shift or herniation is seen.    Evaluation of the osseous structures with the appropriate window appears   unremarkable    The visualized paranasal sinuses mastoid and middle ear regions appear   clear.    Impression: Nosignificant change when allowing for differences in   technique.    < end of copied text >    Pathology and Imaging reviewed  Palliative, Onc, and Rad Onc recommendations reviewed Magan Potter M.D.  PGY1  [phone] / [id number]    68y M w/ unknown hx a/f metastatic dx to the brain from suspected lung primary s/p bronch pending bx results    Interval Hx / subjective: c/o images being upsidedown close to midnight after seroquel and melatonin at 22:00; neuro exam by NF found that pt was disoriented to place but otherwise AAOx3; no intervention; pt confirmed o/n events but denied they have persisted into the morning; vertigo persists, no acute interval.    ROS:  CON: dizziness, lightheadedness; NO fever, chills  HEENT: continued change to vision; denied changes to smell, hearing, taste; denied throat pain, chest pain  CV: denied chest pain, racing heart, skipped beats  RESP: denied SOB, cough, wheezing  GI: nausea, vomiting; denied heartburn, diarrhea, constipation, dark or bloody stool  : denied flank pain; pain or burning with urination; bloody urine  MSK: left lower leg pain now less than previously, no swelling; denied muscle ache  ENDO: denied heat or cold intolerance  NEURO: endorsed trouble finding words; denied headache, slurred speech; tingling, numbness  SKIN: denied itching, rash, other skin changes    MEDICATIONS  (STANDING):  dexamethasone  Injectable 4 milliGRAM(s) IV Push every 6 hours  docusate sodium 100 milliGRAM(s) Oral three times a day  enoxaparin Injectable 40 milliGRAM(s) SubCutaneous at bedtime  levETIRAcetam  IVPB 500 milliGRAM(s) IV Intermittent every 12 hours  meclizine 25 milliGRAM(s) Oral two times a day  pantoprazole    Tablet 40 milliGRAM(s) Oral before breakfast  polyethylene glycol 3350 17 Gram(s) Oral two times a day  QUEtiapine 25 milliGRAM(s) Oral at bedtime    MEDICATIONS  (PRN):  acetaminophen   Tablet .. 650 milliGRAM(s) Oral every 6 hours PRN Temp greater or equal to 38C (100.4F), Mild Pain (1 - 3), Moderate Pain (4 - 6)  ALBUTerol/ipratropium for Nebulization 3 milliLiter(s) Nebulizer every 6 hours PRN Shortness of Breath and/or Wheezing  aluminum hydroxide/magnesium hydroxide/simethicone Suspension 30 milliLiter(s) Oral every 6 hours PRN Dyspepsia  LORazepam     Tablet 0.5 milliGRAM(s) Oral once PRN Anxiety  melatonin 3 milliGRAM(s) Oral at bedtime PRN Insomnia  ondansetron Injectable 4 milliGRAM(s) IV Push every 6 hours PRN Nausea    Objective:  Vital Signs Last 24 Hrs  T(C): 37 (04 Sep 2019 05:49), Max: 37 (04 Sep 2019 05:49)  T(F): 98.6 (04 Sep 2019 05:49), Max: 98.6 (04 Sep 2019 05:49)  HR: 70 (04 Sep 2019 05:49) (63 - 72)  BP: 108/71 (04 Sep 2019 05:49) (102/55 - 124/79)  BP(mean): --  RR: 16 (04 Sep 2019 05:49) (16 - 17)  SpO2: 97% (04 Sep 2019 05:49) (97% - 98%)    Physical Exam:  GEN: NAD, less active this morning  HEENT: PERRL, EOMI though limited because it provokes vertigo; mucous dry; neck gaunt, without LAD  CV: RRR, S1, S2; no M/R/G; good capillary refill  PULM: LCTAB; not using accessory muscles  ABD: normal bowel sounds; non-distended, soft; non-tender; no rebound, no guarding  Extremities: no clubbing, cyanosis; no edema; DP and radial pulses palpable    NEURO EXAM  - Mental Status:  AAOx3; speech is fluent   - Cranial Nerves II-XII:    II:  PERRLA; visual fields are full to confrontation  III, IV, VI:  EOMI, but limited due to provocation of vertigo  V:  facial sensation is intact in the V1-V3 distribution bilaterally  VII:  face is symmetric with normal eye closure and baring of teeth  VIII:  hearing is intact to finger rub  IX, X:  uvula is midline and soft palate rises symmetrically  XI:  head turning and shoulder shrug are intact bilaterally  XII:  tongue protrudes in the midline  - Motor:  right bicep, tricep 4/5; strength is 5/5 throughout the rest of the MSK; decreased muscle bulk and tone throughout; no pronator drift  - Sensory:  intact to light touch and joint-position sense throughout  - Coordination:  finger-nose-finger and heel-knee-shin intact without dysmetria but provokes nausea/vomiting/vertigo  - Gait: pt too dizzy to walk but was able to transfer from bed to chair                          13.7   9.97  )-----------( 252      ( 04 Sep 2019 07:15 )             40.6   09-04    131<L>  |  95<L>  |  19  ----------------------------<  144<H>  4.4   |  23  |  0.65    Ca    8.7      04 Sep 2019 07:15  Phos  2.9     09-04  Mg     2.5     09-04    TPro  6.5  /  Alb  3.8  /  TBili  0.5  /  DBili  x   /  AST  11  /  ALT  38  /  AlkPhos  86  09-04    Cytopathology - Non Gyn Report (08.29.19 @ 18:04)    Cytopathology - Non Gyn Report:   ACCESSION No:  [id number]    MD ARLIN CARABALLO               3    Cytopathology Report    Final Diagnosis  LUNG, RIGHT UPPER LOBE, EMNB-GUIDED BIOPSY AND FNA  POSITIVE FOR MALIGNANT CELLS.  Non-small cell carcinoma, favor adenocarcinoma  Cytology slides reveal a cellular specimen composed of some  syncytial, crowded groups and predominantly single-lying  neoplastic cells displaying monotony, mild enlargement, nuclear  hyperchromasia, moderate amount of cytoplasm. The biopsy,cell  block and touch prep material is scant. 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 An attempt will be made to obtain  molecular studies on the biopsy material,(block 1B), an addendum  will follow. Clinical, pathologic, and radiologic correlation is  essential.    Immunohistochemistry: The neoplastic cells are positive for TTF-1  and negative for synaptophysin and chromogranin (background  staining) supporting a diagnosis of pulmonary adenocarcinoma.  Please also refer to specimen number  [id number]  This case was reviewed for  with staff  cytopathologists who concur(s) with the diagnosis on 09/04/19.  Slide(s) with built in immunohistochemical study control(s) and  negative control associated with this case has/have been verified  by the sign out pathologist.    For slide(s) without built in controls positive control slides  has/have been reviewed and approved by immunohistochemistry lab  These immunohistochemical/ in-situ hybridization tests have been  developed and their performance characteristics determined by  [facility], Department of Pathology,  Division of Immunopathology, [id number] [place] [address], [place] [address].  It has not been cleared or approved by the U.[place]. Food  and Drug Administration.  The FDA has determined that such  clearance or approval is not necessary.  This test is used for  clinical purposes.  The laboratory is certified under the CLIA-88  as qualified to perform high complexity clinical testing.    Imaging personally reviewed: CT head, no interval change    < from: CT Head No Cont (09.02.19 @ 09:28) >  EXAM:  CT BRAIN        PROCEDURE DATE:  Sep  2 2019     INTERPRETATION:  Clinical indications: Follow-up brain lesions.    Multiple axial sections were performed from base of skull to vertex   without contrast enhancement. Coronal and sagittal reconstructions were   performed as well    Abnormal high attenuated lesions involving the posterior fossa and   supratentorial region are again identified. Overall these findings appear   unchanged in size and configuration and demonstrate some surrounding   edema. No significant shift or herniation is seen.    Evaluation of the osseous structures with the appropriate window appears   unremarkable    The visualized paranasal sinuses mastoid and middle ear regions appear   clear.    Impression: Nosignificant change when allowing for differences in   technique.    < end of copied text >    Pathology and Imaging reviewed  Palliative, Onc, and Rad Onc recommendations reviewed

## 2019-09-04 NOTE — PROVIDER CONTACT NOTE (OTHER) - ASSESSMENT
Patient stated that he is seeing the room upside down and feeling dizzy. No complaints of nausea, headache, spots in vision.

## 2019-09-04 NOTE — PROGRESS NOTE ADULT - SUBJECTIVE AND OBJECTIVE BOX
SUBJECTIVE AND OBJECTIVE:  INTERVAL HPI/OVERNIGHT EVENTS:    DNR on chart:   Allergies    No Known Allergies    Intolerances    MEDICATIONS  (STANDING):  dexamethasone  Injectable 4 milliGRAM(s) IV Push every 6 hours  docusate sodium 100 milliGRAM(s) Oral three times a day  enoxaparin Injectable 40 milliGRAM(s) SubCutaneous at bedtime  levETIRAcetam  IVPB 500 milliGRAM(s) IV Intermittent every 12 hours  meclizine 25 milliGRAM(s) Oral two times a day  pantoprazole    Tablet 40 milliGRAM(s) Oral before breakfast  polyethylene glycol 3350 17 Gram(s) Oral two times a day  QUEtiapine 25 milliGRAM(s) Oral at bedtime    MEDICATIONS  (PRN):  acetaminophen   Tablet .. 650 milliGRAM(s) Oral every 6 hours PRN Temp greater or equal to 38C (100.4F), Mild Pain (1 - 3), Moderate Pain (4 - 6)  ALBUTerol/ipratropium for Nebulization 3 milliLiter(s) Nebulizer every 6 hours PRN Shortness of Breath and/or Wheezing  aluminum hydroxide/magnesium hydroxide/simethicone Suspension 30 milliLiter(s) Oral every 6 hours PRN Dyspepsia  LORazepam     Tablet 0.5 milliGRAM(s) Oral once PRN Anxiety  melatonin 3 milliGRAM(s) Oral at bedtime PRN Insomnia  ondansetron Injectable 4 milliGRAM(s) IV Push every 6 hours PRN Nausea      ITEMS UNCHECKED ARE NOT PRESENT    PRESENT SYMPTOMS: [ ]Unable to obtain due to poor mentation   Source if other than patient:  [ ]Family   [ ]Team     Pain:  [ ] yes [ ] no  QOL impact -   Location -                    Aggravating factors -  Quality -  Radiation -  Timing-  Severity (0-10 scale):  Minimal acceptable level (0-10 scale):     Dyspnea:                           [ ]Mild [ ]Moderate [ ]Severe  Anxiety:                             [ ]Mild [ ]Moderate [ ]Severe  Fatigue:                             [ ]Mild [ ]Moderate [ ]Severe  Nausea:                             [ ]Mild [ ]Moderate [ ]Severe  Loss of appetite:              [ ]Mild [ ]Moderate [ ]Severe  Constipation:                    [ ]Mild [ ]Moderate [ ]Severe    PAIN AD Score:	  http://geriatrictoolkit.[facility]/cog/painad.pdf (Ctrl + left click to view)    Other Symptoms:  [ ]All other review of systems negative     Karnofsky Performance Score/Palliative Performance Status Version 2:         %    http://npcrc.org/files/news/palliative_performance_scale_ppsv2.pdf  PPSv2.pdf  PHYSICAL EXAM:  Vital Signs Last 24 Hrs  T(C): 37 (04 Sep 2019 05:49), Max: 37 (04 Sep 2019 05:49)  T(F): 98.6 (04 Sep 2019 05:49), Max: 98.6 (04 Sep 2019 05:49)  HR: 70 (04 Sep 2019 05:49) (63 - 72)  BP: 108/71 (04 Sep 2019 05:49) (102/55 - 124/79)  BP(mean): --  RR: 16 (04 Sep 2019 05:49) (16 - 17)  SpO2: 97% (04 Sep 2019 05:49) (97% - 98%) I&O's Summary   GENERAL:  [ ]Alert  [ ]Oriented x   [ ]Lethargic  [ ]Cachexia  [ ]Unarousable  [ ]Verbal  [ ]Non-Verbal  Behavioral:   [ ] Anxiety  [ ] Delirium [ ] Agitation [ ] Other  HEENT:  [ ]Normal   [ ]Dry mouth   [ ]ET Tube/Trach  [ ]Oral lesions  PULMONARY:   [ ]Clear [ ]Tachypnea  [ ]Audible excessive secretions   [ ]Rhonchi        [ ]Right [ ]Left [ ]Bilateral  [ ]Crackles        [ ]Right [ ]Left [ ]Bilateral  [ ]Wheezing     [ ]Right [ ]Left [ ]Bilateral  CARDIOVASCULAR:    [ ]Regular [ ]Irregular [ ]Tachy  [ ]Bryson [ ]Murmur [ ]Other  GASTROINTESTINAL:  [ ]Soft  [ ]Distended   [ ]+BS  [ ]Non tender [ ]Tender  [ ]PEG [ ]OGT/ NGT   Last BM:      GENITOURINARY:  [ ]Normal [ ] Incontinent   [ ]Oliguria/Anuria   [ ]Jimenez  MUSCULOSKELETAL:   [ ]Normal   [ ]Weakness  [ ]Bed/Wheelchair bound [ ]Edema  NEUROLOGIC:   [ ]No focal deficits  [ ] Cognitive impairment  [ ] Dysphagia [ ]Dysarthria [ ] Paresis [ ]Other   SKIN:   [ ]Normal   [ ]Pressure ulcer(s)  [ ]Rash    CRITICAL CARE:  [ ] Shock Present  [ ]Septic [ ]Cardiogenic [ ]Neurologic [ ]Hypovolemic  [ ]  Vasopressors [ ]  Inotropes   [ ] Respiratory failure present [ ] Mechanical Ventilation [ ] Non-invasive ventilatory support [ ] High-Flow  [ ] Acute  [ ] Chronic [ ] Hypoxic  [ ] Hypercarbic [ ] Other  [ ] Other organ failure     LABS:                        13.7   9.97  )-----------( 252      ( 04 Sep 2019 07:15 )             40.6   09-04    131<L>  |  95<L>  |  19  ----------------------------<  144<H>  4.4   |  23  |  0.65    Ca    8.7      04 Sep 2019 07:15  Phos  2.9     09-04  Mg     2.5     09-04    TPro  6.5  /  Alb  3.8  /  TBili  0.5  /  DBili  x   /  AST  11  /  ALT  38  /  AlkPhos  86  09-04        RADIOLOGY & ADDITIONAL STUDIES:    Protein Calorie Malnutrition Present: [ ] yes [ ] no  [ ] PPSV2 < or = 30%  [ ] significant weight loss [ ] poor nutritional intake [ ] anasarca [ ] catabolic state Albumin, Serum: 3.8 g/dL (09-04-19 @ 07:15)  Artificial Nutrition [ ]     REFERRALS:   [ ]Chaplaincy  [ ] Hospice  [ ]Child Life  [ ]Social Work  [ ]Case management [ ]Holistic Therapy     Goals of Care Document:     Chart Note-Oncology Fellow [MANDY Robison] (09-04-19 @ 09:41) SUBJECTIVE AND OBJECTIVE:  INTERVAL HPI/OVERNIGHT EVENTS: No acute events overnight. Patient was seen and examined at bedside. Patient reports hallucinations overnight, "room was upside down", and one episode of vomiting. Patient denies headache or changes in vision and states that he is able to ambulate to and from bathroom with assistance.     DNR on chart:   Allergies    No Known Allergies    Intolerances    MEDICATIONS  (STANDING):  dexamethasone  Injectable 4 milliGRAM(s) IV Push every 6 hours  docusate sodium 100 milliGRAM(s) Oral three times a day  enoxaparin Injectable 40 milliGRAM(s) SubCutaneous at bedtime  levETIRAcetam  IVPB 500 milliGRAM(s) IV Intermittent every 12 hours  meclizine 25 milliGRAM(s) Oral two times a day  pantoprazole    Tablet 40 milliGRAM(s) Oral before breakfast  polyethylene glycol 3350 17 Gram(s) Oral two times a day  QUEtiapine 25 milliGRAM(s) Oral at bedtime    MEDICATIONS  (PRN):  acetaminophen   Tablet .. 650 milliGRAM(s) Oral every 6 hours PRN Temp greater or equal to 38C (100.4F), Mild Pain (1 - 3), Moderate Pain (4 - 6)  ALBUTerol/ipratropium for Nebulization 3 milliLiter(s) Nebulizer every 6 hours PRN Shortness of Breath and/or Wheezing  aluminum hydroxide/magnesium hydroxide/simethicone Suspension 30 milliLiter(s) Oral every 6 hours PRN Dyspepsia  LORazepam     Tablet 0.5 milliGRAM(s) Oral once PRN Anxiety  melatonin 3 milliGRAM(s) Oral at bedtime PRN Insomnia  ondansetron Injectable 4 milliGRAM(s) IV Push every 6 hours PRN Nausea      ITEMS UNCHECKED ARE NOT PRESENT    PRESENT SYMPTOMS: [ ]Unable to obtain due to poor mentation   Source if other than patient:  [ ]Family   [ ]Team     Pain:  [ ] yes [x] no  QOL impact -   Location -                    Aggravating factors -  Quality -  Radiation -  Timing-  Severity (0-10 scale):  Minimal acceptable level (0-10 scale):     Dyspnea:                           [ ]Mild [ ]Moderate [ ]Severe  Anxiety:                             [x]Mild [ ]Moderate [ ]Severe  Fatigue:                             [x]Mild [ ]Moderate [ ]Severe  Nausea:                             [ ]Mild [ ]Moderate [ ]Severe  Loss of appetite:              [ ]Mild [x]Moderate [ ]Severe  Constipation:                    [ ]Mild [ ]Moderate [ ]Severe    PAIN AD Score:	  http://geriatrictoolkit.[email or web address]/cog/painad.pdf (Ctrl + left click to view)    Other Symptoms:  [x]All other review of systems negative     Karnofsky Performance Score/Palliative Performance Status Version 2:  40-50%    http://[facility].org/files/news/palliative_performance_scale_ppsv2.pdf  [email or web address].pdf  PHYSICAL EXAM:  Vital Signs Last 24 Hrs  T(C): 37 (04 Sep 2019 05:49), Max: 37 (04 Sep 2019 05:49)  T(F): 98.6 (04 Sep 2019 05:49), Max: 98.6 (04 Sep 2019 05:49)  HR: 70 (04 Sep 2019 05:49) (63 - 72)  BP: 108/71 (04 Sep 2019 05:49) (102/55 - 124/79)  BP(mean): --  RR: 16 (04 Sep 2019 05:49) (16 - 17)  SpO2: 97% (04 Sep 2019 05:49) (97% - 98%) I&O's Summary   GENERAL:  [ ]Alert  [ ]Oriented x   [ ]Lethargic  [ ]Cachexia  [ ]Unarousable  [ ]Verbal  [ ]Non-Verbal  Behavioral:   [ ] Anxiety  [ ] Delirium [ ] Agitation [ ] Other  HEENT:  [ ]Normal   [ ]Dry mouth   [ ]ET Tube/Trach  [ ]Oral lesions  PULMONARY:   [ ]Clear [ ]Tachypnea  [ ]Audible excessive secretions   [ ]Rhonchi        [ ]Right [ ]Left [ ]Bilateral  [ ]Crackles        [ ]Right [ ]Left [ ]Bilateral  [ ]Wheezing     [ ]Right [ ]Left [ ]Bilateral  CARDIOVASCULAR:    [ ]Regular [ ]Irregular [ ]Tachy  [ ]Bryson [ ]Murmur [ ]Other  GASTROINTESTINAL:  [ ]Soft  [ ]Distended   [ ]+BS  [ ]Non tender [ ]Tender  [ ]PEG [ ]OGT/ NGT   Last BM:      GENITOURINARY:  [ ]Normal [ ] Incontinent   [ ]Oliguria/Anuria   [ ]Jimenez  MUSCULOSKELETAL:   [ ]Normal   [ ]Weakness  [ ]Bed/Wheelchair bound [ ]Edema  NEUROLOGIC:   [ ]No focal deficits  [ ] Cognitive impairment  [ ] Dysphagia [ ]Dysarthria [ ] Paresis [ ]Other   SKIN:   [ ]Normal   [ ]Pressure ulcer(s)  [ ]Rash    CRITICAL CARE:  [ ] Shock Present  [ ]Septic [ ]Cardiogenic [ ]Neurologic [ ]Hypovolemic  [ ]  Vasopressors [ ]  Inotropes   [ ] Respiratory failure present [ ] Mechanical Ventilation [ ] Non-invasive ventilatory support [ ] High-Flow  [ ] Acute  [ ] Chronic [ ] Hypoxic  [ ] Hypercarbic [ ] Other  [ ] Other organ failure     LABS:                        13.7   9.97  )-----------( 252      ( 04 Sep 2019 07:15 )             40.6   09-04    131<L>  |  95<L>  |  19  ----------------------------<  144<H>  4.4   |  23  |  0.65    Ca    8.7      04 Sep 2019 07:15  Phos  2.9     09-04  Mg     2.5     09-04    TPro  6.5  /  Alb  3.8  /  TBili  0.5  /  DBili  x   /  AST  11  /  ALT  38  /  AlkPhos  86  09-04        RADIOLOGY & ADDITIONAL STUDIES:    Protein Calorie Malnutrition Present: [ ] yes [ ] no  [ ] PPSV2 < or = 30%  [ ] significant weight loss [ ] poor nutritional intake [ ] anasarca [ ] catabolic state Albumin, Serum: 3.8 g/dL (09-04-19 @ 07:15)  Artificial Nutrition [ ]     REFERRALS:   [ ]Chaplaincy  [ ] Hospice  [ ]Child Life  [ ]Social Work  [ ]Case management [ ]Holistic Therapy     Goals of Care Document:     Chart Note-Oncology Fellow [MANDY Robison] (09-04-19 @ 09:41) SUBJECTIVE AND OBJECTIVE:  INTERVAL HPI/OVERNIGHT EVENTS: No acute events overnight. Patient was seen and examined at bedside. Patient reports hallucinations overnight, "room was upside down", and one episode of vomiting. Patient denies headache or changes in vision and states that he is able to ambulate to and from bathroom with assistance. Present bedside when oncology fellow delivered pathology results of NSCLC adenocarcinoma.     DNR on chart:   Allergies    No Known Allergies    Intolerances    MEDICATIONS  (STANDING):  dexamethasone  Injectable 4 milliGRAM(s) IV Push every 6 hours  docusate sodium 100 milliGRAM(s) Oral three times a day  enoxaparin Injectable 40 milliGRAM(s) SubCutaneous at bedtime  levETIRAcetam  IVPB 500 milliGRAM(s) IV Intermittent every 12 hours  meclizine 25 milliGRAM(s) Oral two times a day  pantoprazole    Tablet 40 milliGRAM(s) Oral before breakfast  polyethylene glycol 3350 17 Gram(s) Oral two times a day  QUEtiapine 25 milliGRAM(s) Oral at bedtime    MEDICATIONS  (PRN):  acetaminophen   Tablet .. 650 milliGRAM(s) Oral every 6 hours PRN Temp greater or equal to 38C (100.4F), Mild Pain (1 - 3), Moderate Pain (4 - 6)  ALBUTerol/ipratropium for Nebulization 3 milliLiter(s) Nebulizer every 6 hours PRN Shortness of Breath and/or Wheezing  aluminum hydroxide/magnesium hydroxide/simethicone Suspension 30 milliLiter(s) Oral every 6 hours PRN Dyspepsia  LORazepam     Tablet 0.5 milliGRAM(s) Oral once PRN Anxiety  melatonin 3 milliGRAM(s) Oral at bedtime PRN Insomnia  ondansetron Injectable 4 milliGRAM(s) IV Push every 6 hours PRN Nausea      ITEMS UNCHECKED ARE NOT PRESENT    PRESENT SYMPTOMS: [ ]Unable to obtain due to poor mentation   Source if other than patient:  [ ]Family   [ ]Team     Pain:  [ ] yes [x] no  QOL impact -   Location -                    Aggravating factors -  Quality -  Radiation -  Timing-  Severity (0-10 scale):  Minimal acceptable level (0-10 scale):     Dyspnea:                           [ ]Mild [ ]Moderate [ ]Severe  Anxiety:                             [x]Mild [ ]Moderate [ ]Severe  Fatigue:                             [x]Mild [ ]Moderate [ ]Severe  Nausea:                             [ ]Mild [ ]Moderate [ ]Severe  Loss of appetite:              [ ]Mild [x]Moderate [ ]Severe  Constipation:                    [ ]Mild [ ]Moderate [ ]Severe    PAIN AD Score:	  http://geriatrictoolkit.[place]/cog/painad.pdf (Ctrl + left click to view)    Other Symptoms:  [x]All other review of systems negative     Karnofsky Performance Score/Palliative Performance Status Version 2:  40-50%    http://[email or web address]/files/news/palliative_performance_scale_ppsv2.pdf  [email or web address].pdf  PHYSICAL EXAM:  Vital Signs Last 24 Hrs  T(C): 37 (04 Sep 2019 05:49), Max: 37 (04 Sep 2019 05:49)  T(F): 98.6 (04 Sep 2019 05:49), Max: 98.6 (04 Sep 2019 05:49)  HR: 70 (04 Sep 2019 05:49) (63 - 72)  BP: 108/71 (04 Sep 2019 05:49) (102/55 - 124/79)  BP(mean): --  RR: 16 (04 Sep 2019 05:49) (16 - 17)  SpO2: 97% (04 Sep 2019 05:49) (97% - 98%) I&O's Summary   GENERAL:  [x] Alert  [x]Oriented x3  [ ]Lethargic  [xCachexia  [ ]Unarousable  [x]Verbal  [ ]Non-Verbal  Behavioral: Calm  [ ] Anxiety  [ ] Delirium [ ] Agitation [ ] Other  HEENT:  [x]Normal   [ ]Dry mouth   [ ]ET Tube/Trach  [ ]Oral lesions  PULMONARY:   [x]Clear [ ]Tachypnea  [ ]Audible excessive secretions   [ ]Rhonchi        [ ]Right [ ]Left [ ]Bilateral  [ ]Crackles        [ ]Right [ ]Left [ ]Bilateral  [ ]Wheezing     [ ]Right [ ]Left [ ]Bilateral  CARDIOVASCULAR:    [x]Regular [ ]Irregular [ ]Tachy  [ ]Bryson [ ]Murmur [ ]Other  GASTROINTESTINAL:  [x]Soft  [ ]Distended   [x]+BS  [x]Non tender [ ]Tender  [ ]PEG [ ]OGT/ NGT   Last BM: Today     GENITOURINARY:  [x]Normal [ ] Incontinent   [ ]Oliguria/Anuria   [ ]Jimenez  MUSCULOSKELETAL: Ataxia improving, ambulating with assistance  [x]Normal   [ ]Weakness  [ ]Bed/Wheelchair bound [ ]Edema  NEUROLOGIC:   [ ]No focal deficits  [ ] Cognitive impairment  [ ] Dysphagia [ ]Dysarthria [ ] Paresis [x]Other Multiple metastatic lesions in brain causing ataxia, n/v/ diplopia at times  SKIN:   [x]Normal   [ ]Pressure ulcer(s)  [ ]Rash    CRITICAL CARE:  [ ] Shock Present  [ ]Septic [ ]Cardiogenic [ ]Neurologic [ ]Hypovolemic  [ ]  Vasopressors [ ]  Inotropes   [ ] Respiratory failure present [ ] Mechanical Ventilation [ ] Non-invasive ventilatory support [ ] High-Flow  [ ] Acute  [ ] Chronic [ ] Hypoxic  [ ] Hypercarbic [ ] Other  [ ] Other organ failure     LABS:                        13.7   9.97  )-----------( 252      ( 04 Sep 2019 07:15 )             40.6   09-04    131<L>  |  95<L>  |  19  ----------------------------<  144<H>  4.4   |  23  |  0.65    Ca    8.7      04 Sep 2019 07:15  Phos  2.9     09-04  Mg     2.5     09-04    TPro  6.5  /  Alb  3.8  /  TBili  0.5  /  DBili  x   /  AST  11  /  ALT  38  /  AlkPhos  86  09-04        RADIOLOGY & ADDITIONAL STUDIES: reviewed. None new.    Protein Calorie Malnutrition Present: [ ] yes [ ] no  [ ] PPSV2 < or = 30%  [x] significant weight loss [ ] poor nutritional intake [ ] anasarca [ ] catabolic state Albumin, Serum: 3.8 g/dL (09-04-19 @ 07:15)  Artificial Nutrition [ ]     REFERRALS:   [ ]Chaplaincy  [ ] Hospice  [ ]Child Life  [ ]Social Work  [x]Case management [ ]Holistic Therapy

## 2019-09-05 ENCOUNTER — TRANSCRIPTION ENCOUNTER (OUTPATIENT)
Age: 69
End: 2019-09-05

## 2019-09-05 LAB
ANION GAP SERPL CALC-SCNC: 14 MMO/L — SIGNIFICANT CHANGE UP (ref 7–14)
BUN SERPL-MCNC: 20 MG/DL — SIGNIFICANT CHANGE UP (ref 7–23)
CALCIUM SERPL-MCNC: 9.2 MG/DL — SIGNIFICANT CHANGE UP (ref 8.4–10.5)
CHLORIDE SERPL-SCNC: 97 MMOL/L — LOW (ref 98–107)
CO2 SERPL-SCNC: 25 MMOL/L — SIGNIFICANT CHANGE UP (ref 22–31)
CREAT SERPL-MCNC: 0.68 MG/DL — SIGNIFICANT CHANGE UP (ref 0.5–1.3)
GLUCOSE SERPL-MCNC: 122 MG/DL — HIGH (ref 70–99)
MAGNESIUM SERPL-MCNC: 2.3 MG/DL — SIGNIFICANT CHANGE UP (ref 1.6–2.6)
PHOSPHATE SERPL-MCNC: 2.9 MG/DL — SIGNIFICANT CHANGE UP (ref 2.5–4.5)
POTASSIUM SERPL-MCNC: 4.3 MMOL/L — SIGNIFICANT CHANGE UP (ref 3.5–5.3)
POTASSIUM SERPL-SCNC: 4.3 MMOL/L — SIGNIFICANT CHANGE UP (ref 3.5–5.3)
SODIUM SERPL-SCNC: 136 MMOL/L — SIGNIFICANT CHANGE UP (ref 135–145)

## 2019-09-05 PROCEDURE — 99239 HOSP IP/OBS DSCHRG MGMT >30: CPT | Mod: GC

## 2019-09-05 PROCEDURE — 99222 1ST HOSP IP/OBS MODERATE 55: CPT

## 2019-09-05 RX ORDER — IPRATROPIUM/ALBUTEROL SULFATE 18-103MCG
3 AEROSOL WITH ADAPTER (GRAM) INHALATION
Qty: 360 | Refills: 0
Start: 2019-09-05 | End: 2019-10-04

## 2019-09-05 RX ORDER — LANOLIN ALCOHOL/MO/W.PET/CERES
1 CREAM (GRAM) TOPICAL
Qty: 30 | Refills: 0
Start: 2019-09-05 | End: 2019-10-04

## 2019-09-05 RX ORDER — ESOMEPRAZOLE MAGNESIUM 40 MG/1
1 CAPSULE, DELAYED RELEASE ORAL
Qty: 0 | Refills: 0 | DISCHARGE

## 2019-09-05 RX ORDER — ONDANSETRON 8 MG/1
1 TABLET, FILM COATED ORAL
Qty: 120 | Refills: 0
Start: 2019-09-05 | End: 2019-10-04

## 2019-09-05 RX ORDER — ACETAMINOPHEN 500 MG
2 TABLET ORAL
Qty: 0 | Refills: 0 | DISCHARGE
Start: 2019-09-05

## 2019-09-05 RX ORDER — POLYETHYLENE GLYCOL 3350 17 G/17G
17 POWDER, FOR SOLUTION ORAL
Qty: 1000 | Refills: 0
Start: 2019-09-05 | End: 2019-10-04

## 2019-09-05 RX ORDER — DEXAMETHASONE 0.5 MG/5ML
1 ELIXIR ORAL
Qty: 56 | Refills: 0
Start: 2019-09-05 | End: 2019-09-18

## 2019-09-05 RX ADMIN — Medication 4 MILLIGRAM(S): at 13:00

## 2019-09-05 RX ADMIN — Medication 25 MILLIGRAM(S): at 05:32

## 2019-09-05 RX ADMIN — LEVETIRACETAM 400 MILLIGRAM(S): 250 TABLET, FILM COATED ORAL at 17:22

## 2019-09-05 RX ADMIN — POLYETHYLENE GLYCOL 3350 17 GRAM(S): 17 POWDER, FOR SOLUTION ORAL at 17:22

## 2019-09-05 RX ADMIN — Medication 25 MILLIGRAM(S): at 17:22

## 2019-09-05 RX ADMIN — POLYETHYLENE GLYCOL 3350 17 GRAM(S): 17 POWDER, FOR SOLUTION ORAL at 05:32

## 2019-09-05 RX ADMIN — ENOXAPARIN SODIUM 40 MILLIGRAM(S): 100 INJECTION SUBCUTANEOUS at 22:10

## 2019-09-05 RX ADMIN — Medication 100 MILLIGRAM(S): at 22:10

## 2019-09-05 RX ADMIN — QUETIAPINE FUMARATE 25 MILLIGRAM(S): 200 TABLET, FILM COATED ORAL at 22:10

## 2019-09-05 RX ADMIN — Medication 100 MILLIGRAM(S): at 13:00

## 2019-09-05 RX ADMIN — PANTOPRAZOLE SODIUM 40 MILLIGRAM(S): 20 TABLET, DELAYED RELEASE ORAL at 05:32

## 2019-09-05 RX ADMIN — Medication 4 MILLIGRAM(S): at 17:22

## 2019-09-05 RX ADMIN — LEVETIRACETAM 400 MILLIGRAM(S): 250 TABLET, FILM COATED ORAL at 06:10

## 2019-09-05 RX ADMIN — Medication 100 MILLIGRAM(S): at 05:32

## 2019-09-05 RX ADMIN — Medication 4 MILLIGRAM(S): at 05:32

## 2019-09-05 NOTE — PROGRESS NOTE ADULT - PROBLEM SELECTOR PLAN 7
pt reported overnight on 8/28-29 lower left leg pain radiating up and down similar to pain previously reported s/p fall with avulsion fracture per pt  - XR left ankle and XR left knee w/o fx/dislocation/lytic or blastic lesions pt reported overnight on 8/28-29 lower left leg pain radiating up and down similar to pain previously reported s/p fall with avulsion fracture per pt  - XR left ankle and XR left knee w/o fx/dislocation/lytic or blastic lesions  - resolved

## 2019-09-05 NOTE — PROGRESS NOTE ADULT - PROBLEM SELECTOR PLAN 2
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

## 2019-09-05 NOTE — DISCHARGE NOTE PROVIDER - NSDCFUSCHEDAPPT_GEN_ALL_CORE_FT
MD ARLIN CARABALLO ; 09/12/2019 ; NPP [address] MD ARLIN CARABALLO ; 09/12/2019 ; NPP [address] MD ARLIN CARABALLO ; 09/12/2019 ; NPP [address] MD ARLIN CARABALLO ; 09/12/2019 ; NPP [address] MD ARLIN CARABALLO ; 09/12/2019 ; NPP [address] MD ARLIN CARABALLO ; 09/12/2019 ; NPP [address] MD ARLIN CARABALLO ; 09/12/2019 ; NPP [address] MD ARLIN CARABALLO ; 09/12/2019 ; NPP [address] MD ARLIN CARABALLO ; 09/12/2019 ; NPP [address] MD ARLIN CARABALLO ; 09/12/2019 ; NPP [address]

## 2019-09-05 NOTE — PROGRESS NOTE ADULT - PROBLEM SELECTOR PLAN 8
- appreciate nutrition recs, clear liquid diet with ensure - appreciate nutrition recs  - will trial regular diet today with fluid restriction

## 2019-09-05 NOTE — PROGRESS NOTE ADULT - SUBJECTIVE AND OBJECTIVE BOX
Danay Watts MD, S  Internal Medicine PGY1  Pager: [phone] | [facility] Spectra: [phone]    68y M w/ unknown hx a/f metastatic dx to the brain from suspected lung primary s/p bronch pending bx results    Interval Hx / subjective: c/o images being upsidedown close to midnight after seroquel and melatonin for past 3 days  - vertigo persists, no acute interval.  - denies CP, SOB, abd pain, n/v/d    ROS:  CON: dizziness, lightheadedness; NO fever, chills  HEENT: continued change to vision; denied changes to smell, hearing, taste; denied throat pain, chest pain  CV: denied chest pain, racing heart, skipped beats  RESP: denied SOB, cough, wheezing  GI: nausea, vomiting; denied heartburn, diarrhea, constipation, dark or bloody stool  : denied flank pain; pain or burning with urination; bloody urine  MSK: left lower leg pain now less than previously, no swelling; denied muscle ache  ENDO: denied heat or cold intolerance  NEURO: endorsed trouble finding words; denied headache, slurred speech; tingling, numbness  SKIN: denied itching, rash, other skin changes    MEDICATIONS  (STANDING):  dexamethasone     Tablet 4 milliGRAM(s) Oral every 6 hours  docusate sodium 100 milliGRAM(s) Oral three times a day  enoxaparin Injectable 40 milliGRAM(s) SubCutaneous at bedtime  levETIRAcetam  IVPB 500 milliGRAM(s) IV Intermittent every 12 hours  meclizine 25 milliGRAM(s) Oral two times a day  pantoprazole    Tablet 40 milliGRAM(s) Oral before breakfast  polyethylene glycol 3350 17 Gram(s) Oral two times a day  QUEtiapine 25 milliGRAM(s) Oral at bedtime    MEDICATIONS  (PRN):  acetaminophen   Tablet .. 650 milliGRAM(s) Oral every 6 hours PRN Temp greater or equal to 38C (100.4F), Mild Pain (1 - 3), Moderate Pain (4 - 6)  ALBUTerol/ipratropium for Nebulization 3 milliLiter(s) Nebulizer every 6 hours PRN Shortness of Breath and/or Wheezing  aluminum hydroxide/magnesium hydroxide/simethicone Suspension 30 milliLiter(s) Oral every 6 hours PRN Dyspepsia  LORazepam     Tablet 0.5 milliGRAM(s) Oral once PRN Anxiety  melatonin 3 milliGRAM(s) Oral at bedtime PRN Insomnia  ondansetron Injectable 4 milliGRAM(s) IV Push every 6 hours PRN Nausea    Allergies    No Known Allergies    Intolerances        VITAL SIGNS:  ICU Vital Signs Last 24 Hrs  T(C): 36.6 (05 Sep 2019 05:13), Max: 36.6 (04 Sep 2019 14:13)  T(F): 97.8 (05 Sep 2019 05:13), Max: 97.9 (04 Sep 2019 14:13)  HR: 67 (05 Sep 2019 05:13) (62 - 68)  BP: 110/71 (05 Sep 2019 05:13) (110/71 - 127/79)  BP(mean): --  ABP: --  ABP(mean): --  RR: 18 (05 Sep 2019 05:13) (17 - 18)  SpO2: 100% (05 Sep 2019 05:13) (98% - 100%)      CAPILLARY BLOOD GLUCOSE          Daily Weight in k (02 Sep 2019 14:14)      Physical Exam:****  GEN: NAD, less active this morning  HEENT: PERRL, EOMI though limited because it provokes vertigo; mucous dry; neck gaunt, without LAD  CV: RRR, S1, S2; no M/R/G; good capillary refill  PULM: LCTAB; not using accessory muscles  ABD: normal bowel sounds; non-distended, soft; non-tender; no rebound, no guarding  Extremities: no clubbing, cyanosis; no edema; DP and radial pulses palpable    NEURO EXAM  - Mental Status:  AAOx3; speech is fluent   - Cranial Nerves II-XII:    II:  PERRLA; visual fields are full to confrontation  III, IV, VI:  EOMI, but limited due to provocation of vertigo  V:  facial sensation is intact in the V1-V3 distribution bilaterally  VII:  face is symmetric with normal eye closure and baring of teeth  VIII:  hearing is intact to finger rub  IX, X:  uvula is midline and soft palate rises symmetrically  XI:  head turning and shoulder shrug are intact bilaterally  XII:  tongue protrudes in the midline  - Motor:  right bicep, tricep 4/5; strength is 5/5 throughout the rest of the MSK; decreased muscle bulk and tone throughout; no pronator drift  - Sensory:  intact to light touch and joint-position sense throughout  - Coordination:  finger-nose-finger and heel-knee-shin intact without dysmetria but provokes nausea/vomiting/vertigo  - Gait: pt too dizzy to walk but was able to transfer from bed to chair                          13.7   9.97  )-----------( 252      ( 04 Sep 2019 07:15 )             40.6   -    131<L>  |  95<L>  |  19  ----------------------------<  144<H>  4.4   |  23  |  0.65    Ca    8.7      04 Sep 2019 07:15  Phos  2.9     -  Mg     2.5     -    TPro  6.5  /  Alb  3.8  /  TBili  0.5  /  DBili  x   /  AST  11  /  ALT  38  /  AlkPhos  86      Cytopathology - Non Gyn Report (19 @ 18:04)    Cytopathology - Non Gyn Report:   ACCESSION No:  [id number]    MD ARLIN CARABALLO               3    Cytopathology Report    Final Diagnosis  LUNG, RIGHT UPPER LOBE, EMNB-GUIDED BIOPSY AND FNA  POSITIVE FOR MALIGNANT CELLS.  Non-small cell carcinoma, favor adenocarcinoma  Cytology slides reveal a cellular specimen composed of some  syncytial, crowded groups and predominantly single-lying  neoplastic cells displaying monotony, mild enlargement, nuclear  hyperchromasia, moderate amount of cytoplasm. The biopsy,cell  block and touch prep material is scant. 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 An attempt will be made to obtain  molecular studies on the biopsy material,(block 1B), an addendum  will follow. Clinical, pathologic, and radiologic correlation is  essential.    Immunohistochemistry: The neoplastic cells are positive for TTF-1  and negative for synaptophysin and chromogranin (background  staining) supporting a diagnosis of pulmonary adenocarcinoma.  Please also refer to specimen number  [id number]-.  This case was reviewed for  with staff  cytopathologists who concur(s) with the diagnosis on 19.  Slide(s) with built in immunohistochemical study control(s) and  negative control associated with this case has/have been verified  by the sign out pathologist.    For slide(s) without built in controls positive control slides  has/have been reviewed and approved by immunohistochemistry lab  These immunohistochemical/ in-situ hybridization tests have been  developed and their performance characteristics determined by  [facility], Department of Pathology,  Division of Immunopathology, [address] [place] [address].  It has not been cleared or approved by the U.S. Food  and Drug Administration.  The FDA has determined that such  clearance or approval is not necessary.  This test is used for  clinical purposes.  The laboratory is certified under the CLIA-88  as qualified to perform high complexity clinical testing.    Imaging personally reviewed: CT head, no interval change    < from: CT Head No Cont (19 @ 09:28) >  EXAM:  CT BRAIN        PROCEDURE DATE:  Sep  2 2019     INTERPRETATION:  Clinical indications: Follow-up brain lesions.    Multiple axial sections were performed from base of skull to vertex   without contrast enhancement. Coronal and sagittal reconstructions were   performed as well    Abnormal high attenuated lesions involving the posterior fossa and   supratentorial region are again identified. Overall these findings appear   unchanged in size and configuration and demonstrate some surrounding   edema. No significant shift or herniation is seen.    Evaluation of the osseous structures with the appropriate window appears   unremarkable    The visualized paranasal sinuses mastoid and middle ear regions appear   clear.    Impression: Nosignificant change when allowing for differences in   technique.    < end of copied text >    Pathology and Imaging reviewed  Palliative, Onc, and Rad Onc recommendations reviewed Danay Watts MD, S  Internal Medicine PGY1  Pager: [phone] | [facility] Spectra: [phone]    68y M w/ unknown hx a/f metastatic dx to the brain from suspected lung primary s/p bronch pending bx results    Interval Hx / subjective: c/o hallucinations late at night  for past 3 days with "images being hung upsidedown as on a hook," on seroquel and melatonin  - vertigo stable, no acute interval changes, n/v improved since yesterday on liquid diet; desires to start regular diet again today  - denies CP, SOB, abd pain, n/v/d    ROS:  CON: dizziness, lightheadedness; NO fever, chills  HEENT: continued change to vision; denied changes to smell, hearing, taste; denied throat pain, chest pain  CV: denied chest pain, racing heart, skipped beats  RESP: denied SOB, cough, wheezing  GI: nausea, vomiting; denied heartburn, diarrhea, constipation, dark or bloody stool  : denied flank pain; pain or burning with urination; bloody urine  MSK: left lower leg pain now less than previously, no swelling; denied muscle ache  ENDO: denied heat or cold intolerance  NEURO: endorsed trouble finding words; denied headache, slurred speech; tingling, numbness  SKIN: denied itching, rash, other skin changes    MEDICATIONS  (STANDING):  dexamethasone     Tablet 4 milliGRAM(s) Oral every 6 hours  docusate sodium 100 milliGRAM(s) Oral three times a day  enoxaparin Injectable 40 milliGRAM(s) SubCutaneous at bedtime  levETIRAcetam  IVPB 500 milliGRAM(s) IV Intermittent every 12 hours  meclizine 25 milliGRAM(s) Oral two times a day  pantoprazole    Tablet 40 milliGRAM(s) Oral before breakfast  polyethylene glycol 3350 17 Gram(s) Oral two times a day  QUEtiapine 25 milliGRAM(s) Oral at bedtime    MEDICATIONS  (PRN):  acetaminophen   Tablet .. 650 milliGRAM(s) Oral every 6 hours PRN Temp greater or equal to 38C (100.4F), Mild Pain (1 - 3), Moderate Pain (4 - 6)  ALBUTerol/ipratropium for Nebulization 3 milliLiter(s) Nebulizer every 6 hours PRN Shortness of Breath and/or Wheezing  aluminum hydroxide/magnesium hydroxide/simethicone Suspension 30 milliLiter(s) Oral every 6 hours PRN Dyspepsia  LORazepam     Tablet 0.5 milliGRAM(s) Oral once PRN Anxiety  melatonin 3 milliGRAM(s) Oral at bedtime PRN Insomnia  ondansetron Injectable 4 milliGRAM(s) IV Push every 6 hours PRN Nausea    Allergies    No Known Allergies    Intolerances        VITAL SIGNS:  ICU Vital Signs Last 24 Hrs  T(C): 36.6 (05 Sep 2019 05:13), Max: 36.6 (04 Sep 2019 14:13)  T(F): 97.8 (05 Sep 2019 05:13), Max: 97.9 (04 Sep 2019 14:13)  HR: 67 (05 Sep 2019 05:13) (62 - 68)  BP: 110/71 (05 Sep 2019 05:13) (110/71 - 127/79)  BP(mean): --  ABP: --  ABP(mean): --  RR: 18 (05 Sep 2019 05:13) (17 - 18)  SpO2: 100% (05 Sep 2019 05:13) (98% - 100%)      CAPILLARY BLOOD GLUCOSE          Daily Weight in k (02 Sep 2019 14:14)      Physical Exam:   GEN: NAD, conversational, sitting up in bed  HEENT:  EOMI no nystagmus; moist mucous membranes; neck gaunt, without LAD  CV: RRR, S1, S2; no M/R/G  PULM: CTAB; not using accessory muscles  ABD: normal bowel sounds; non-distended, soft; non-tender; no rebound, no guarding  Extremities: no edema; DP and radial pulses palpable    NEURO EXAM  - Mental Status:  AAOx3; speech is fluent   - Cranial Nerves II-XII:    II:  PERRLA; visual fields are full to confrontation  III, IV, VI:  EOMI, but limited due to provocation of vertigo  V:  facial sensation is intact in the V1-V3 distribution bilaterally  VII:  face is symmetric with normal eye closure and baring of teeth  VIII:  hearing is intact to finger rub  IX, X:  uvula is midline and soft palate rises symmetrically  XI:  head turning and shoulder shrug are intact bilaterally  XII:  tongue protrudes in the midline  - Motor:  rt upper leg 4/5, strength is 5/5 throughout the rest of the MSK; decreased muscle bulk and tone throughout  - Sensory:  intact to light touch and joint-position sense throughout  - Coordination:  finger-nose-finger mild dysmetria worse on L>R                           13.7   9.97  )-----------( 252      ( 04 Sep 2019 07:15 )             40.6   -    131<L>  |  95<L>  |  19  ----------------------------<  144<H>  4.4   |  23  |  0.65    Ca    8.7      04 Sep 2019 07:15  Phos  2.9     -  Mg     2.5     -    TPro  6.5  /  Alb  3.8  /  TBili  0.5  /  DBili  x   /  AST  11  /  ALT  38  /  AlkPhos  86      Cytopathology - Non Gyn Report (19 @ 18:04)    Cytopathology - Non Gyn Report:   ACCESSION No:  [id number]    MD ARLIN CARABALLO               3    Cytopathology Report    Final Diagnosis  LUNG, RIGHT UPPER LOBE, EMNB-GUIDED BIOPSY AND FNA  POSITIVE FOR MALIGNANT CELLS.  Non-small cell carcinoma, favor adenocarcinoma  Cytology slides reveal a cellular specimen composed of some  syncytial, crowded groups and predominantly single-lying  neoplastic cells displaying monotony, mild enlargement, nuclear  hyperchromasia, moderate amount of cytoplasm. The biopsy,cell  block and touch prep material is scant. 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 An attempt will be made to obtain  molecular studies on the biopsy material,(block 1B), an addendum  will follow. Clinical, pathologic, and radiologic correlation is  essential.    Immunohistochemistry: The neoplastic cells are positive for TTF-1  and negative for synaptophysin and chromogranin (background  staining) supporting a diagnosis of pulmonary adenocarcinoma.  Please also refer to specimen number  [id number]-.  This case was reviewed for  with staff  cytopathologists who concur(s) with the diagnosis on 19.  Slide(s) with built in immunohistochemical study control(s) and  negative control associated with this case has/have been verified  by the sign out pathologist.    For slide(s) without built in controls positive control slides  has/have been reviewed and approved by immunohistochemistry lab  These immunohistochemical/ in-situ hybridization tests have been  developed and their performance characteristics determined by  [facility], Department of Pathology,  Division of Immunopathology, [phone] [place] [address], [place] [address].  It has not been cleared or approved by the U.S. Food  and Drug Administration.  The FDA has determined that such  clearance or approval is not necessary.  This test is used for  clinical purposes.  The laboratory is certified under the CLIA-88  as qualified to perform high complexity clinical testing.    Imaging personally reviewed: CT head, no interval change    < from: CT Head No Cont (19 @ 09:28) >  EXAM:  CT BRAIN        PROCEDURE DATE:  Sep  2 2019     INTERPRETATION:  Clinical indications: Follow-up brain lesions.    Multiple axial sections were performed from base of skull to vertex   without contrast enhancement. Coronal and sagittal reconstructions were   performed as well    Abnormal high attenuated lesions involving the posterior fossa and   supratentorial region are again identified. Overall these findings appear   unchanged in size and configuration and demonstrate some surrounding   edema. No significant shift or herniation is seen.    Evaluation of the osseous structures with the appropriate window appears   unremarkable    The visualized paranasal sinuses mastoid and middle ear regions appear   clear.    Impression: Nosignificant change when allowing for differences in   technique.    < end of copied text >    Pathology and Imaging reviewed  Palliative, Onc, and Rad Onc recommendations reviewed Danay Watts MD, S  Internal Medicine PGY1  Pager: [phone] | [facility] Spectra: [id number]    68y M w/ unknown hx a/f metastatic dx to the brain from suspected lung primary s/p bronch, bx notable for NSCLC likely adenocarcinoma.    Interval Hx / subjective: c/o hallucinations late at night  for past 3 days with "images being hung upsidedown as on a hook," on seroquel and melatonin  - vertigo stable, no acute interval changes, n/v improved since yesterday on liquid diet; desires to start regular diet again today  - denies CP, SOB, abd pain, n/v/d    ROS:  CON: dizziness, lightheadedness; NO fever, chills  HEENT: continued change to vision; denied changes to smell, hearing, taste; denied throat pain, chest pain  CV: denied chest pain, racing heart, skipped beats  RESP: denied SOB, cough, wheezing  GI: nausea, vomiting; denied heartburn, diarrhea, constipation, dark or bloody stool  : denied flank pain; pain or burning with urination; bloody urine  MSK: left lower leg pain now less than previously, no swelling; denied muscle ache  ENDO: denied heat or cold intolerance  NEURO: endorsed trouble finding words; denied headache, slurred speech; tingling, numbness  SKIN: denied itching, rash, other skin changes    MEDICATIONS  (STANDING):  dexamethasone     Tablet 4 milliGRAM(s) Oral every 6 hours  docusate sodium 100 milliGRAM(s) Oral three times a day  enoxaparin Injectable 40 milliGRAM(s) SubCutaneous at bedtime  levETIRAcetam  IVPB 500 milliGRAM(s) IV Intermittent every 12 hours  meclizine 25 milliGRAM(s) Oral two times a day  pantoprazole    Tablet 40 milliGRAM(s) Oral before breakfast  polyethylene glycol 3350 17 Gram(s) Oral two times a day  QUEtiapine 25 milliGRAM(s) Oral at bedtime    MEDICATIONS  (PRN):  acetaminophen   Tablet .. 650 milliGRAM(s) Oral every 6 hours PRN Temp greater or equal to 38C (100.4F), Mild Pain (1 - 3), Moderate Pain (4 - 6)  ALBUTerol/ipratropium for Nebulization 3 milliLiter(s) Nebulizer every 6 hours PRN Shortness of Breath and/or Wheezing  aluminum hydroxide/magnesium hydroxide/simethicone Suspension 30 milliLiter(s) Oral every 6 hours PRN Dyspepsia  LORazepam     Tablet 0.5 milliGRAM(s) Oral once PRN Anxiety  melatonin 3 milliGRAM(s) Oral at bedtime PRN Insomnia  ondansetron Injectable 4 milliGRAM(s) IV Push every 6 hours PRN Nausea    Allergies    No Known Allergies    Intolerances        VITAL SIGNS:  ICU Vital Signs Last 24 Hrs  T(C): 36.6 (05 Sep 2019 05:13), Max: 36.6 (04 Sep 2019 14:13)  T(F): 97.8 (05 Sep 2019 05:13), Max: 97.9 (04 Sep 2019 14:13)  HR: 67 (05 Sep 2019 05:13) (62 - 68)  BP: 110/71 (05 Sep 2019 05:13) (110/71 - 127/79)  BP(mean): --  ABP: --  ABP(mean): --  RR: 18 (05 Sep 2019 05:13) (17 - 18)  SpO2: 100% (05 Sep 2019 05:13) (98% - 100%)      CAPILLARY BLOOD GLUCOSE          Daily Weight in k (02 Sep 2019 14:14)      Physical Exam:   GEN: NAD, conversational, sitting up in bed  HEENT:  EOMI no nystagmus; moist mucous membranes; neck gaunt, without LAD  CV: RRR, S1, S2; no M/R/G  PULM: CTAB; not using accessory muscles  ABD: normal bowel sounds; non-distended, soft; non-tender; no rebound, no guarding  Extremities: no edema; DP and radial pulses palpable    NEURO EXAM  - Mental Status:  AAOx3; speech is fluent   - Cranial Nerves II-XII:    II:  PERRLA; visual fields are full to confrontation  III, IV, VI:  EOMI, but limited due to provocation of vertigo  V:  facial sensation is intact in the V1-V3 distribution bilaterally  VII:  face is symmetric with normal eye closure and baring of teeth  VIII:  hearing is intact to finger rub  IX, X:  uvula is midline and soft palate rises symmetrically  XI:  head turning and shoulder shrug are intact bilaterally  XII:  tongue protrudes in the midline  - Motor:  rt upper leg 4/5, strength is 5/5 throughout the rest of the MSK; decreased muscle bulk and tone throughout  - Sensory:  intact to light touch and joint-position sense throughout  - Coordination:  finger-nose-finger mild dysmetria worse on L>R                           13.7   9.97  )-----------( 252      ( 04 Sep 2019 07:15 )             40.6   -    131<L>  |  95<L>  |  19  ----------------------------<  144<H>  4.4   |  23  |  0.65    Ca    8.7      04 Sep 2019 07:15  Phos  2.9       Mg     2.5         TPro  6.5  /  Alb  3.8  /  TBili  0.5  /  DBili  x   /  AST  11  /  ALT  38  /  AlkPhos  86      Cytopathology - Non Gyn Report (19 @ 18:04)    Cytopathology - Non Gyn Report:   ACCESSION No:  [id number]    MD ARLIN CARABALLO               3    Cytopathology Report    Final Diagnosis  LUNG, RIGHT UPPER LOBE, EMNB-GUIDED BIOPSY AND FNA  POSITIVE FOR MALIGNANT CELLS.  Non-small cell carcinoma, favor adenocarcinoma  Cytology slides reveal a cellular specimen composed of some  syncytial, crowded groups and predominantly single-lying  neoplastic cells displaying monotony, mild enlargement, nuclear  hyperchromasia, moderate amount of cytoplasm. The biopsy,cell  block and touch prep material is scant. 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 An attempt will be made to obtain  molecular studies on the biopsy material,(block 1B), an addendum  will follow. Clinical, pathologic, and radiologic correlation is  essential.    Immunohistochemistry: The neoplastic cells are positive for TTF-1  and negative for synaptophysin and chromogranin (background  staining) supporting a diagnosis of pulmonary adenocarcinoma.  Please also refer to specimen number  [id number]-.  This case was reviewed for  with staff  cytopathologists who concur(s) with the diagnosis on 19.  Slide(s) with built in immunohistochemical study control(s) and  negative control associated with this case has/have been verified  by the sign out pathologist.    For slide(s) without built in controls positive control slides  has/have been reviewed and approved by immunohistochemistry lab  These immunohistochemical/ in-situ hybridization tests have been  developed and their performance characteristics determined by  [facility], Department of Pathology,  Division of Immunopathology, [phone] [place] [address], [place] [address].  It has not been cleared or approved by the U.S. Food  and Drug Administration.  The FDA has determined that such  clearance or approval is not necessary.  This test is used for  clinical purposes.  The laboratory is certified under the CLIA-88  as qualified to perform high complexity clinical testing.    Imaging personally reviewed: CT head, no interval change    < from: CT Head No Cont (19 @ 09:28) >  EXAM:  CT BRAIN        PROCEDURE DATE:  Sep  2 2019     INTERPRETATION:  Clinical indications: Follow-up brain lesions.    Multiple axial sections were performed from base of skull to vertex   without contrast enhancement. Coronal and sagittal reconstructions were   performed as well    Abnormal high attenuated lesions involving the posterior fossa and   supratentorial region are again identified. Overall these findings appear   unchanged in size and configuration and demonstrate some surrounding   edema. No significant shift or herniation is seen.    Evaluation of the osseous structures with the appropriate window appears   unremarkable    The visualized paranasal sinuses mastoid and middle ear regions appear   clear.    Impression: Nosignificant change when allowing for differences in   technique.    < end of copied text >    Pathology and Imaging reviewed  Palliative, Onc, and Rad Onc recommendations reviewed

## 2019-09-05 NOTE — PROGRESS NOTE ADULT - PROBLEM SELECTOR PLAN 9
diet: nutrition following; recs reviewed and appreciated     - will change pt's diet to clear liquids with ensure as pt c/o nausea provoked by the smell of hospital food     - nutrition heretofore will need to be part of the GOC discussion  VTE: SCDs in the setting of possible hemorrhage from brain lesion  dispo: palliative c/s, recs appreaciated; chaplaincy c/s, recs appreciated    case to be discussed with Dr. Kerr diet: nutrition following; recs reviewed and appreciated     - nutrition heretofore will need to be part of the C discussion  VTE: SCDs in the setting of possible hemorrhage from brain lesion  dispo: palliative c/s, recs appreaciated; chaplaincy c/s, recs appreciated  - pending PT recs re: rehab for dispo    case discussed with Dr. Kerr diet: nutrition following; recs reviewed and appreciated     - nutrition heretofore will need to be part of the [facility] discussion  VTE: lovenox 40 qHS  dispo: palliative c/s, recs appreaciated; chaplaincy c/s, recs appreciated  - pending PT recs re: rehab for dispo  - Establish care at [facility] for onc tx  - Make f/u appt for [facility] for brain mets    case discussed with Dr. Kerr

## 2019-09-05 NOTE — PROGRESS NOTE ADULT - PROBLEM SELECTOR PLAN 1
multiple lesions seen on CTH noncon and redemonstrated on MR Brain most likely representing metastasis disease from suspected primary lung CA  - CT A/P negative for suspicious lesion  - vision change likely 2/2 to occipital lesions  - ataxia likely 2/2 to cerebellar lesions  - neurosurgery c/s, interventions limited due to the extensive number of brain lesions  - c/w levetiracetam 500 mg BID for seizure ppx  - oncology c/s, recs appreciated  - fall risk precautions  - PT c/s on pt with worsening ataxia 2/2 to brain metastases  - meclizine for dizziness  - rad/onc eval re: RT once primary malignancy confirmed  - worsening presenting sxs, steroids were increased to decadron 2 mg IV TID, now increased to 4 mg IV TID per palliative recs  - 0.5 Haldol IV given last night for worsening N/V/vertigo with good symptom control  - no interval change on repeat CTH multiple lesions seen on CTH noncon and redemonstrated on MR Brain most likely representing metastasis disease from suspected primary lung CA  - CT A/P negative for suspicious lesion  - vision change likely 2/2 to occipital lesions  - ataxia likely 2/2 to cerebellar lesions  - neurosurgery c/s, interventions limited due to the extensive number of brain lesions  - c/w levetiracetam 500 mg BID for seizure ppx  - oncology c/s, recs appreciated  - fall risk precautions  - PT c/s on pt with worsening ataxia 2/2 to brain metastases  - meclizine for dizziness  - rad/onc eval rec outpatient gamma knife stereotactic treatment  - worsening presenting sxs, steroids were increased to decadron 4 mg PO q6h per palliative recs  - no interval change on repeat CTH  - onc recs: f/u molecular studies, as outpatient, chemo therapy to be determined as outpatient

## 2019-09-05 NOTE — PROGRESS NOTE ADULT - PROBLEM SELECTOR PLAN 5
- change to clear liquid diet, c/w symptomatic management, f/u palliative recs - change to regular diet, c/w symptomatic management, f/u palliative recs

## 2019-09-05 NOTE — DISCHARGE NOTE PROVIDER - NSDCFUADDAPPT_GEN_ALL_CORE_FT
Your appointment with Dr. Corona of Radiation Oncology is for   Sept 12, 3:00PM  His office is at  [address] [place] [address] - [place] [address]  [phone] Your appointment with Dr. Corona of Radiation Oncology is for   Sept 12, 3:00PM  His office is at  [address] [place] [address] [place] [address]  [phone]    ONCOLOGY at [facility] will reach out to you regarding your appointment with them.

## 2019-09-05 NOTE — DISCHARGE NOTE PROVIDER - CARE PROVIDER_API CALL
Tamica Toney)  Internal Medicine; Medical Oncology  [address] [place] [address]  Phone: [phone]  Fax: [phone]  Follow Up Time:

## 2019-09-05 NOTE — PROGRESS NOTE ADULT - ATTENDING COMMENTS
Patient seen and examined. Symptoms of vertigo improved, but pt with intermittent psychosis. Brain lesions and steroids likely contributing to hallucinations, in addition to hospital acquired delirium.  Mr. Daugherty is a 69 yo man with symptoms of vertigo and ataxia 2/2 to metastatic lesions to the brain, now confirmed to have non-small cell adenocarcinoma of the lung s/p EMNB-guided bx and FNA of the RUL.   - f/u with heme/onc regarding possible treatment options and/or prognosis. Pt to f/u as an outpatient at [facility]  - f/u palliative care recs  - agree with transitioning to oral dexamethasone  - hyponatremia resolved. Continue fluid restriction for SIADH  - advance diet as tolerated  - PT consult  Remainder of plan as discussed above. Patient is medically stable to be discharged home today. Spent 40 min coordinating discharge plan and discussion pt's condition with him and his daughter

## 2019-09-05 NOTE — DISCHARGE NOTE PROVIDER - NSDCHHNEEDSERVICEOTHER_GEN_ALL_CORE_FT
Need skilled home PT services for gait training with roller, strengthening, and transfer training. Needs rolling walker for d/c.
- - -

## 2019-09-05 NOTE — PROGRESS NOTE ADULT - PROBLEM SELECTOR PLAN 3
primary lung CA in pt with 50 pack year smoking hx vs. metastatic dx from unknown primary  - pulmonology appreciated, s/p bronchoscopy w/ bx   - biopsy results pending primary lung CA in pt with 50 pack year smoking hx vs. metastatic dx from unknown primary  - pulmonology appreciated, s/p bronchoscopy w/ bx showing non small cell adenocarcinoma  - genotyping pending, may be fine to f/u as outpatient by onc to determine exact treatment therapy primary lung CA in pt with 50 pack year smoking hx    - pulmonology appreciated, s/p bronchoscopy w/ bx showing non small cell adenocarcinoma  - genotyping pending, may be fine to f/u as outpatient by onc to determine exact treatment therapy

## 2019-09-05 NOTE — PROGRESS NOTE ADULT - PROBLEM SELECTOR PLAN 6
Na 135 at presentation with iggy of 128 to date  - ddx includes poor PO intake vs. central SIADH 2/2 to brain lesions vs. paraneoplastic syndrome  - hyponatremia did not respond to fluids over 2 days  - Erick 96 and UOsm 430 suggest element of SIADH  - will begin fluid restrictions and repeat BMP this PM Na 135 at presentation with iggy of 128 to date, now normalized to 136 on 9/5.  - Most likely 2/2 central SIADH from to brain lesions vs nausea/vomiting vs. paraneoplastic syndrome  - hyponatremia did not respond to fluids over 2 days, responded to fluid restriction  - Erick 96 and UOsm 430 suggest element of SIADH Na 135 at presentation with iggy of 128 to date, now normalized to 136 on 9/5.  - Most likely 2/2 SIADH from lung cancer vs brain lesions vs nausea/vomiting vs. paraneoplastic syndrome  - hyponatremia did not respond to fluids over 2 days, responded to fluid restriction  - Erick 96 and UOsm 430 suggest element of SIADH

## 2019-09-05 NOTE — DISCHARGE NOTE PROVIDER - HOSPITAL COURSE
69 y/o male with no known PMH p/w 1 year hx of vertigo and ataxia worsening in the last 2 months found to have metastatic disease to the brain on CTH from suspected lung primary on CT chest.  Pt had had initial work up in [facility] whence he resided and [place].  MRI confirmed multiple brain lesions in territories that was consistent with his vertigo and ataxia, as well as his change in vision.  The pt was continued on Keppra for seizure prophylaxis and decadron to mitigate the effects of vasogenic edema observed on imaging.  He was also given meclizine for dizziness, and zofran for nausea/vomiting, which inially worked but stopped several days into the admission.  The patient's vertigo, nausea, and vomiting worsened, and were relieved by increasing decadron ultimately to 4mg QID.  Neurosurgery was consulted but could offer no intervention due to the extensiveness of the pt's metastatic disease.  Pulmonolgy was consulted 67 y/o male with no known PMH p/w 1 year hx of vertigo and ataxia worsening in the last 2 months found to have metastatic disease to the brain on CTH from suspected lung primary on CT chest.  Pt had had initial work up in [facility] whence he resided and [place].  MRI confirmed multiple brain lesions in territories that was consistent with his vertigo and ataxia, as well as his change in vision.  The pt was continued on Keppra for seizure prophylaxis and decadron to mitigate the effects of vasogenic edema observed on imaging. He was also given meclizine for dizziness, and zofran for nausea/vomiting, which inially worked but stopped several days into the admission. Ophtho was consulted for blurred vision, and recommended outpt follow-up. The patient's vertigo, nausea, and vomiting worsened, and were relieved by increasing decadron ultimately to 4mg QID.  Neurosurgery was consulted but could offer no intervention due to the extensiveness of the pt's metastatic disease. Biopsy results returned as non-small cell lung cancer (likely adenocarcinoma), PDL negative, with final molecular results pending as of discharge. Pt was evaluated by PT, who recommended a home walker. Pt was DC'd home in stable condition. At the time of discharge, the patient was hemodynamically stable, was tolerating PO diet, was voiding urine and passing stool, was ambulating, and was comfortable with adequate pain control. He was instructed to f/u with heme/onc, rad/onc, and ophtho as an outpt.        Consults:    pulm    rad/onc    heme/onc    palliative    ophtho    PT

## 2019-09-05 NOTE — DISCHARGE NOTE PROVIDER - NSDCCPTREATMENT_GEN_ALL_CORE_FT
PRINCIPAL PROCEDURE  Procedure: Bronchoscopy in adult  Findings and Treatment: PRINCIPAL PROCEDURE  Procedure: Bronchoscopy in adult  Findings and Treatment: 8/28 MR head w/wo IV contrast:  IMPRESSION:    Multiple intraparenchymal enhancing masses several of which are   associated with hemorrhage and/or vasogenic edema. Metastatic disease is   a prime consideration.  2 adjacent lesions efface the fourth ventricular lumen however no   significant hydrocephalus is seen at this time.  8/29 cytopathology:  LUNG, RIGHT UPPER LOBE, BRONCHOALVEOLAR LAVAGE  POSITIVE FOR MALIGNANT CELLS.  Non-small cell carcinoma, favor Adenocarcinoma.  Cytology slide and cell block section shows a hypercellular  specimen composed of crowded 3-dimensional groups and single  lying malignant cells with enlarged nuclei containing prominent  nucleoli and areas of finely vacuolated cytoplasm. These  cytomorphologic findings are consistent with non-small cell  carcinoma, favor adenocarcinoma.  PD-L1 Immunohistochemistry  Antibody: [place] PDL1 ()  Tissue type: lung  Block: 1B  Result: Tumor Proportion Score (TPS*) 0%  Interpretation: negative  8/28/19 CT C/A/P w/IV contrast:  IMPRESSION:   Emphysema with bilateral solid pulmonary nodules measuring 2.3 cm in the   right upper lobe and 0.7 cm in the left upper lobe. Additional   groundglass and mixed solid and ground glass nodular opacities   bilaterally. Pulmonary nodules are indeterminate, but may be seen in the   setting of a primary lung malignancy. No intrathoracic lymphadenopathy.  No solid organ lesion or lymphadenopathy in the abdomen or pelvis. PRINCIPAL PROCEDURE  Procedure: Bronchoscopy in adult  Findings and Treatment: 8/28 MR head w/wo IV contrast:  IMPRESSION:    Multiple intraparenchymal enhancing masses several of which are   associated with hemorrhage and/or vasogenic edema. Metastatic disease is   a prime consideration.  2 adjacent lesions efface the fourth ventricular lumen however no   significant hydrocephalus is seen at this time.  8/29 cytopathology:  LUNG, RIGHT UPPER LOBE, BRONCHOALVEOLAR LAVAGE  POSITIVE FOR MALIGNANT CELLS.  Non-small cell carcinoma, favor Adenocarcinoma.  Cytology slide and cell block section shows a hypercellular  specimen composed of crowded 3-dimensional groups and single  lying malignant cells with enlarged nuclei containing prominent  nucleoli and areas of finely vacuolated cytoplasm. These  cytomorphologic findings are consistent with non-small cell  carcinoma, favor adenocarcinoma.  PD-L1 Immunohistochemistry  Antibody: [place] PDL1 ()  Tissue type: lung  Block: 1B  Result: Tumor Proportion Score (TPS*) 0%  Interpretation: negative  8/28/19 CT C/A/P w/IV contrast:  IMPRESSION:   Emphysema with bilateral solid pulmonary nodules measuring 2.3 cm in the   right upper lobe and 0.7 cm in the left upper lobe. Additional   groundglass and mixed solid and ground glass nodular opacities   bilaterally. Pulmonary nodules are indeterminate, but may be seen in the   setting of a primary lung malignancy. No intrathoracic lymphadenopathy.  No solid organ lesion or lymphadenopathy in the abdomen or pelvis.        SECONDARY PROCEDURE  Procedure: MRI brain  Findings and Treatment: EXAM:  MR BRAIN WAW     PROCEDURE DATE:  Aug 28 2019   INTERPRETATION:  INDICATIONS:  Multiple brain lesions seen on CT   concerning for metastatic disease.  TECHNIQUE:  Multiplanar imaging was performed using T1 weighted, T2   weighted and FLAIR sequences.  Diffusion weighted and susceptibility   sensitive images were also obtained.  Following intravenous gadolinium,   multiplanar T1 weighted images were performed. 6 cc Gadavist were   administered. 1.5 cc were discarded.    COMPARISON EXAMINATION:  Brain CT 8/28/2019    FINDINGS:    VENTRICLES AND SULCI:  Prominent in size compatible with age-appropriate   volume loss.  INTRA-AXIAL:  Multiple enhancing masses are seen throughout the cerebral   and cerebellar hemispheres as seen on the prior CT scan. Several lesions   demonstrate susceptibility artifact with or without precontrast T1   hyperintensity and heterogeneous T2 signal compatible with associated   hemorrhage. Several lesions are associated with vasogenic edema.  A left occipital mass with significant edema measures 1.4 x 1.3 cm.   A mass within the vermis measures 2.3 x 1.4 cm.   A mass within the right cerebellar hemisphere measures 1.8 x 1.6 cm.   A mass within the left cerebellum involving the cerebellar tonsil   measures 1.8 x 1.8 cm and an adjacent mass within or projecting into the   fourth ventricle measures 1.5 x 1 cm effacing the fourth ventricular   lumen. This nodule significantly indents the adjacent medulla.  Scattered foci of white matter T2 hyperintensity are seen compatible with   mild to moderate microvascular type white matter changes.  EXTRA-AXIAL:  No mass or collection.  VISUALIZED SINUSES:  Normal.  VISUALIZED MASTOIDS:  Clear.  CALVARIUM:  Normal.  CAROTID FLOW VOIDS:  Present.  MISCELLANEOUS:  None.  IMPRESSION:    Multiple intraparenchymal enhancing masses several of which are   associated with hemorrhage and/or vasogenic edema. Metastatic disease is   a prime consideration.  2 adjacent lesions efface the fourth ventricular lumen however no   significant hydroc    Procedure: CT head wo con  Findings and Treatment: EXAM:  CT BRAIN    PROCEDURE DATE:  Sep  2 2019   INTERPRETATION:  Clinical indications: Follow-up brain lesions.  Multiple axial sections were performed from base of skull to vertex   without contrast enhancement. Coronal and sagittal reconstructions were   performed as well  Abnormal high attenuated lesions involving the posterior fossa and   supratentorial region are again identified. Overall these findings appear   unchanged in size and configuration and demonstrate some surrounding   edema. No significant shift or herniation is seen.  Evaluation of the osseous structures with the appropriate window appears   unremarkable  The visualized paranasal sinuses mastoid and middle ear regions appear   clear.  Impression: Nosignificant change when allowing for differences in   technique.  DIANA VALLE M.D., ATTENDING RADIOLOGIST  This document has been electronically signed. Sep  2 2019 11:42AM

## 2019-09-05 NOTE — PROGRESS NOTE ADULT - PROVIDER SPECIALTY LIST ADULT
Anesthesia
Heme/Onc
Internal Medicine
Palliative Care
Pulmonology
Pulmonology
Internal Medicine

## 2019-09-05 NOTE — DISCHARGE NOTE PROVIDER - NSFOLLOWUPCLINICS_GEN_ALL_ED_FT
[facility] Ophthalmology  Ophthalmology  [address] [place] [address] [address]  [place] [address]  Phone: [phone]  Fax:   Follow Up Time:

## 2019-09-05 NOTE — DISCHARGE NOTE PROVIDER - NSDCCPCAREPLAN_GEN_ALL_CORE_FT
PRINCIPAL DISCHARGE DIAGNOSIS  Diagnosis: Brain mass  Assessment and Plan of Treatment: You were found to have brain lesions. This is likely metastatic disease from the lung to the brain. Please follow up with radiation oncology (appt included in note) and hematology oncology at the [facility], who will reach out to you. Also please follow up with ophthalmology at the address listed for your vision changes. Take your decadron, Keppra, meclizine, and seroquel as prescribed. If you experience blackouts, acute worsening, high fevers, inability to stand, or sudden weakness on one side of the body, please seek emergency medical attention immediately.

## 2019-09-05 NOTE — CONSULT NOTE ADULT - CONSULT REASON
Brain met
baseline exam prior to radiation
lung masses and brain lesions
symptom management in the setting of metastatic lung CA (brain), GOC
Lung mass

## 2019-09-05 NOTE — PROGRESS NOTE ADULT - PROBLEM SELECTOR PROBLEM 1
Brain mass
Lung mass
Brain mass
Ataxia
Lung mass

## 2019-09-05 NOTE — PROGRESS NOTE ADULT - ASSESSMENT
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  S/p bronchoscopy, biopsy suggest lung adenocarcinoma.  Course c/b continued nausea/vertigo, but that symptoms improved with increased steroid suggests vasogenic edema as possible cause.  Pt is not a candidate for surgery but possibly radiation +/- chemo; onc following and Rad Onc consulted 69 y/o M without documented PMH who p/w 1 month hx of worsening vertigo and ataxia most likely 2/2 to metastatic disease to the brain with suspected lung primary CA.  S/p bronchoscopy, biopsy suggest lung adenocarcinoma.  Course c/b continued nausea/vertigo, but that symptoms improved with increased steroid suggests vasogenic edema as possible cause.  Pt is not a candidate for surgery but will f/u with rad onc as outpatient for stereotactic radiation and oncology for chemo as outpatient pending genotyping.    - onc following  - rad onc following    PT re-consulted today 9/5, pending dispo  Opthalmology consulted for blurry vision today 9/5

## 2019-09-05 NOTE — DISCHARGE NOTE NURSING/CASE MANAGEMENT/SOCIAL WORK - PATIENT PORTAL LINK FT
You can access the FollowMyHealth Patient Portal offered by [facility] by registering at the following website: [email or web address]. By joining [facility]’s FollowMyHealth portal, you will also be able to view your health information using other applications (apps) compatible with our system.

## 2019-09-05 NOTE — CONSULT NOTE ADULT - SUBJECTIVE AND OBJECTIVE BOX
[facility] Ophthalmology Consult Note    HPI: 67 y/o M without documented PMH who p/w 1 month hx of worsening vertigo and ataxia most likely 2/2 to metastatic disease to the brain with suspected lung primary CA.  S/p bronchoscopy, biopsy suggest lung adenocarcinoma.  Course c/b continued nausea/vertigo, but that symptoms improved with increased steroid suggests vasogenic edema as possible cause.  Pt is not a candidate for surgery but will f/u with rad onc as outpatient for stereotactic radiation and oncology for chemo as outpatient pending genotyping. Ophthalmology consulted for baseline exam prior to treatment. Pt says he feels like his vision has been decreasing over the last year but thinks it may have gotten worse since a fall 2 months ago. Denies flashers, floaters. Was told one year ago that he has visually significant cataracts       PMH: None  MEDICATIONS  (STANDING):  dexamethasone     Tablet 4 milliGRAM(s) Oral every 6 hours  docusate sodium 100 milliGRAM(s) Oral three times a day  enoxaparin Injectable 40 milliGRAM(s) SubCutaneous at bedtime  levETIRAcetam  IVPB 500 milliGRAM(s) IV Intermittent every 12 hours  meclizine 25 milliGRAM(s) Oral two times a day  pantoprazole    Tablet 40 milliGRAM(s) Oral before breakfast  polyethylene glycol 3350 17 Gram(s) Oral two times a day  QUEtiapine 25 milliGRAM(s) Oral at bedtime    MEDICATIONS  (PRN):  acetaminophen   Tablet .. 650 milliGRAM(s) Oral every 6 hours PRN Temp greater or equal to 38C (100.4F), Mild Pain (1 - 3), Moderate Pain (4 - 6)  ALBUTerol/ipratropium for Nebulization 3 milliLiter(s) Nebulizer every 6 hours PRN Shortness of Breath and/or Wheezing  aluminum hydroxide/magnesium hydroxide/simethicone Suspension 30 milliLiter(s) Oral every 6 hours PRN Dyspepsia  LORazepam     Tablet 0.5 milliGRAM(s) Oral once PRN Anxiety  melatonin 3 milliGRAM(s) Oral at bedtime PRN Insomnia  ondansetron Injectable 4 milliGRAM(s) IV Push every 6 hours PRN Nausea  POcHx (including surgeries/lasers/trauma):  None  Drops: None  FamHx: None  Social Hx: None  Allergies: NKDA    ROS:  General (neg), Vision (per HPI), Head and Neck (neg), Pulm (neg), CV (neg), GI (neg),  (neg), Musculoskeletal (neg), Skin/Integ (neg), Neuro (neg), Endocrine (neg), Heme (neg), All/Immuno (neg)    Mood and Affect Appropriate ( x ),  Oriented to Time, Place, and Person x 3 ( x )    Ophthalmology Exam    Visual acuity (sc): 20/70 OU, PH 20/40 OU  Pupils: PERRL OU, no APD  Ttono: 19 OU  Extraocular movements (EOMs): Full OU, no pain, no diplopia   Confrontational Visual Field (CVF):  Full OU  Color Plates: 12/12 OU    Pen Light Exam (PLE)  External:  Flat OU  Lids/Lashes/Lacrimal Ducts: Flat OU    Sclera/Conjunctiva:  W+Q OU  Cornea: Cl OU  Anterior Chamber: D+F OU  Iris:  Flat OU  Lens:  Dense NS OU    Fundus Exam: dilated with 1% tropicamide and 2.5% phenylephrine  Approval obtained from primary team for dilation  Patient aware that pupils can remained dilated for at least 4-6 hours  Exam performed with 20D lens    Vitreous: wnl OU  Disc, cup/disc: sharp and pink, 0.3 OD, 0.4 OS  Macula:  wnl OU  Vessels:  wnl OU  Periphery: wnl OU      Assessment/Plan:   67 y/o M without documented PMH who p/w 1 month hx of worsening vertigo and ataxia most likely 2/2 to metastatic disease to the brain with suspected lung primary CA.  S/p bronchoscopy, biopsy suggest lung adenocarcinoma.  Will f/u with rad onc as outpatient for stereotactic radiation and oncology for chemo as outpatient pending genotyping. Ophthalmology consulted for baseline exam prior to treatment. Pt says he feels like his vision has been decreasing over the last year but thinks it may have gotten worse since a fall 2 months ago. Denies flashers, floaters. Was told one year ago that he has visually significant cataracts  - outpatient catarct eval  - rest of care per primary     Follow-Up:  Patient should follow up his/her ophthalmologist or in the [facility] Ophthalmology Practice within 1 week of discharge.  [address] [place] [id number]  [phone]    S/D/W Dr Galicia (attending)

## 2019-09-05 NOTE — PROGRESS NOTE ADULT - PROBLEM SELECTOR PLAN 4
possible metastatic dx in the setting of findings above in subcentimeter hypodense region seen on CT abdomen  - s/p abdominal U/S (wnl)  - ALT still mildly elevated possible metastatic dx in the setting of findings above in subcentimeter hypodense region seen on CT abdomen, now resolved.  - s/p abdominal U/S (wnl)

## 2019-09-06 VITALS
SYSTOLIC BLOOD PRESSURE: 117 MMHG | DIASTOLIC BLOOD PRESSURE: 70 MMHG | OXYGEN SATURATION: 100 % | TEMPERATURE: 99 F | HEART RATE: 66 BPM | RESPIRATION RATE: 16 BRPM

## 2019-09-06 PROBLEM — Z00.00 ENCOUNTER FOR PREVENTIVE HEALTH EXAMINATION: Status: ACTIVE | Noted: 2019-09-06

## 2019-09-06 RX ORDER — QUETIAPINE FUMARATE 200 MG/1
1 TABLET, FILM COATED ORAL
Qty: 30 | Refills: 0
Start: 2019-09-06 | End: 2019-10-05

## 2019-09-06 RX ORDER — LANOLIN ALCOHOL/MO/W.PET/CERES
1 CREAM (GRAM) TOPICAL
Qty: 30 | Refills: 0
Start: 2019-09-06 | End: 2019-10-05

## 2019-09-06 RX ORDER — IPRATROPIUM/ALBUTEROL SULFATE 18-103MCG
3 AEROSOL WITH ADAPTER (GRAM) INHALATION
Qty: 360 | Refills: 0
Start: 2019-09-06 | End: 2019-10-05

## 2019-09-06 RX ORDER — MECLIZINE HCL 12.5 MG
1 TABLET ORAL
Qty: 60 | Refills: 0
Start: 2019-09-06 | End: 2019-10-05

## 2019-09-06 RX ORDER — LEVETIRACETAM 250 MG/1
1 TABLET, FILM COATED ORAL
Qty: 60 | Refills: 0
Start: 2019-09-06 | End: 2019-10-05

## 2019-09-06 RX ORDER — MECLIZINE HCL 12.5 MG
1 TABLET ORAL
Qty: 0 | Refills: 0 | DISCHARGE

## 2019-09-06 RX ORDER — POLYETHYLENE GLYCOL 3350 17 G/17G
17 POWDER, FOR SOLUTION ORAL
Qty: 1000 | Refills: 0
Start: 2019-09-06 | End: 2019-10-05

## 2019-09-06 RX ORDER — LEVETIRACETAM 250 MG/1
1 TABLET, FILM COATED ORAL
Qty: 0 | Refills: 0 | DISCHARGE

## 2019-09-06 RX ORDER — ONDANSETRON 8 MG/1
1 TABLET, FILM COATED ORAL
Qty: 120 | Refills: 0
Start: 2019-09-06 | End: 2019-10-05

## 2019-09-06 RX ORDER — QUETIAPINE FUMARATE 200 MG/1
1 TABLET, FILM COATED ORAL
Qty: 0 | Refills: 0 | DISCHARGE

## 2019-09-06 RX ADMIN — Medication 4 MILLIGRAM(S): at 00:08

## 2019-09-06 RX ADMIN — PANTOPRAZOLE SODIUM 40 MILLIGRAM(S): 20 TABLET, DELAYED RELEASE ORAL at 06:05

## 2019-09-06 RX ADMIN — Medication 25 MILLIGRAM(S): at 06:05

## 2019-09-06 RX ADMIN — Medication 4 MILLIGRAM(S): at 06:05

## 2019-09-06 RX ADMIN — LEVETIRACETAM 400 MILLIGRAM(S): 250 TABLET, FILM COATED ORAL at 06:05

## 2019-09-06 RX ADMIN — POLYETHYLENE GLYCOL 3350 17 GRAM(S): 17 POWDER, FOR SOLUTION ORAL at 06:05

## 2019-09-06 RX ADMIN — Medication 100 MILLIGRAM(S): at 06:05

## 2019-09-09 ENCOUNTER — OUTPATIENT (OUTPATIENT)
Dept: OUTPATIENT SERVICES | Facility: HOSPITAL | Age: 69
LOS: 1 days | Discharge: HOME | End: 2019-09-09

## 2019-09-09 DIAGNOSIS — C78.00 SECONDARY MALIGNANT NEOPLASM OF UNSPECIFIED LUNG: ICD-10-CM

## 2019-09-10 ENCOUNTER — APPOINTMENT (OUTPATIENT)
Dept: HEMATOLOGY ONCOLOGY | Facility: CLINIC | Age: 69
End: 2019-09-10
Payer: MEDICAID

## 2019-09-10 ENCOUNTER — APPOINTMENT (OUTPATIENT)
Dept: INFUSION THERAPY | Facility: HOSPITAL | Age: 69
End: 2019-09-10

## 2019-09-10 VITALS
RESPIRATION RATE: 18 BRPM | SYSTOLIC BLOOD PRESSURE: 110 MMHG | HEART RATE: 72 BPM | DIASTOLIC BLOOD PRESSURE: 70 MMHG | OXYGEN SATURATION: 100 % | HEIGHT: 66 IN | WEIGHT: 117.95 LBS | BODY MASS INDEX: 18.96 KG/M2 | TEMPERATURE: 98 F

## 2019-09-10 DIAGNOSIS — R91.8 OTHER NONSPECIFIC ABNORMAL FINDING OF LUNG FIELD: ICD-10-CM

## 2019-09-10 DIAGNOSIS — Z84.89 FAMILY HISTORY OF OTHER SPECIFIED CONDITIONS: ICD-10-CM

## 2019-09-10 DIAGNOSIS — C34.11 MALIGNANT NEOPLASM OF UPPER LOBE, RIGHT BRONCHUS OR LUNG: ICD-10-CM

## 2019-09-10 DIAGNOSIS — J38.1 POLYP OF VOCAL CORD AND LARYNX: ICD-10-CM

## 2019-09-10 DIAGNOSIS — Z87.891 PERSONAL HISTORY OF NICOTINE DEPENDENCE: ICD-10-CM

## 2019-09-10 PROCEDURE — 99215 OFFICE O/P EST HI 40 MIN: CPT

## 2019-09-10 RX ORDER — ONDANSETRON 4 MG/1
4 TABLET, ORALLY DISINTEGRATING ORAL
Refills: 0 | Status: ACTIVE | COMMUNITY

## 2019-09-10 RX ORDER — DEXAMETHASONE 4 MG/1
4 TABLET ORAL
Qty: 120 | Refills: 1 | Status: ACTIVE | COMMUNITY
Start: 2019-09-10 | End: 1900-01-01

## 2019-09-10 RX ORDER — MECLIZINE HYDROCHLORIDE 25 MG/1
25 TABLET ORAL
Refills: 0 | Status: ACTIVE | COMMUNITY

## 2019-09-10 RX ORDER — LEVETIRACETAM 500 MG/1
500 TABLET, FILM COATED ORAL
Refills: 0 | Status: ACTIVE | COMMUNITY

## 2019-09-10 RX ORDER — QUETIAPINE FUMARATE 25 MG/1
25 TABLET ORAL
Refills: 0 | Status: ACTIVE | COMMUNITY

## 2019-09-10 RX ORDER — LORATADINE 5 MG
17 TABLET,CHEWABLE ORAL
Refills: 0 | Status: ACTIVE | COMMUNITY

## 2019-09-10 RX ORDER — OMEPRAZOLE 40 MG/1
40 CAPSULE, DELAYED RELEASE ORAL
Qty: 30 | Refills: 5 | Status: ACTIVE | COMMUNITY
Start: 2019-09-10 | End: 1900-01-01

## 2019-09-10 RX ORDER — ACETAMINOPHEN 325 MG/1
325 TABLET ORAL
Refills: 0 | Status: ACTIVE | COMMUNITY

## 2019-09-11 ENCOUNTER — INBOUND DOCUMENT (OUTPATIENT)
Age: 69
End: 2019-09-11

## 2019-09-12 ENCOUNTER — APPOINTMENT (OUTPATIENT)
Dept: RADIATION ONCOLOGY | Facility: CLINIC | Age: 69
End: 2019-09-12
Payer: MEDICAID

## 2019-09-12 ENCOUNTER — INPATIENT (INPATIENT)
Facility: HOSPITAL | Age: 69
LOS: 17 days | Discharge: HOSPICE | End: 2019-09-30
Attending: INTERNAL MEDICINE | Admitting: INTERNAL MEDICINE
Payer: MEDICAID

## 2019-09-12 VITALS
TEMPERATURE: 98 F | DIASTOLIC BLOOD PRESSURE: 83 MMHG | OXYGEN SATURATION: 100 % | RESPIRATION RATE: 24 BRPM | SYSTOLIC BLOOD PRESSURE: 127 MMHG | HEART RATE: 62 BPM

## 2019-09-12 VITALS
TEMPERATURE: 98.2 F | OXYGEN SATURATION: 98 % | DIASTOLIC BLOOD PRESSURE: 73 MMHG | SYSTOLIC BLOOD PRESSURE: 110 MMHG | RESPIRATION RATE: 14 BRPM | HEART RATE: 91 BPM

## 2019-09-12 DIAGNOSIS — C79.31 SECONDARY MALIGNANT NEOPLASM OF BRAIN: ICD-10-CM

## 2019-09-12 DIAGNOSIS — R42 DIZZINESS AND GIDDINESS: ICD-10-CM

## 2019-09-12 LAB
ALBUMIN SERPL ELPH-MCNC: 4.1 G/DL — SIGNIFICANT CHANGE UP (ref 3.3–5)
ALP SERPL-CCNC: 87 U/L — SIGNIFICANT CHANGE UP (ref 40–120)
ALT FLD-CCNC: 28 U/L — SIGNIFICANT CHANGE UP (ref 4–41)
ANION GAP SERPL CALC-SCNC: 17 MMO/L — HIGH (ref 7–14)
APTT BLD: 25 SEC — LOW (ref 27.5–36.3)
AST SERPL-CCNC: 10 U/L — SIGNIFICANT CHANGE UP (ref 4–40)
BASOPHILS # BLD AUTO: 0.03 K/UL — SIGNIFICANT CHANGE UP (ref 0–0.2)
BASOPHILS NFR BLD AUTO: 0.2 % — SIGNIFICANT CHANGE UP (ref 0–2)
BILIRUB SERPL-MCNC: 0.6 MG/DL — SIGNIFICANT CHANGE UP (ref 0.2–1.2)
BLD GP AB SCN SERPL QL: NEGATIVE — SIGNIFICANT CHANGE UP
BUN SERPL-MCNC: 21 MG/DL — SIGNIFICANT CHANGE UP (ref 7–23)
CALCIUM SERPL-MCNC: 9.3 MG/DL — SIGNIFICANT CHANGE UP (ref 8.4–10.5)
CHLORIDE SERPL-SCNC: 87 MMOL/L — LOW (ref 98–107)
CO2 SERPL-SCNC: 22 MMOL/L — SIGNIFICANT CHANGE UP (ref 22–31)
CREAT SERPL-MCNC: 0.72 MG/DL — SIGNIFICANT CHANGE UP (ref 0.5–1.3)
EOSINOPHIL # BLD AUTO: 0.08 K/UL — SIGNIFICANT CHANGE UP (ref 0–0.5)
EOSINOPHIL NFR BLD AUTO: 0.5 % — SIGNIFICANT CHANGE UP (ref 0–6)
GLUCOSE SERPL-MCNC: 154 MG/DL — HIGH (ref 70–99)
HCT VFR BLD CALC: 40.5 % — SIGNIFICANT CHANGE UP (ref 39–50)
HGB BLD-MCNC: 14.1 G/DL — SIGNIFICANT CHANGE UP (ref 13–17)
IMM GRANULOCYTES NFR BLD AUTO: 1.5 % — SIGNIFICANT CHANGE UP (ref 0–1.5)
INR BLD: 1.03 — SIGNIFICANT CHANGE UP (ref 0.88–1.17)
LYMPHOCYTES # BLD AUTO: 1.18 K/UL — SIGNIFICANT CHANGE UP (ref 1–3.3)
LYMPHOCYTES # BLD AUTO: 6.7 % — LOW (ref 13–44)
MCHC RBC-ENTMCNC: 28.8 PG — SIGNIFICANT CHANGE UP (ref 27–34)
MCHC RBC-ENTMCNC: 34.8 % — SIGNIFICANT CHANGE UP (ref 32–36)
MCV RBC AUTO: 82.8 FL — SIGNIFICANT CHANGE UP (ref 80–100)
MONOCYTES # BLD AUTO: 1.12 K/UL — HIGH (ref 0–0.9)
MONOCYTES NFR BLD AUTO: 6.4 % — SIGNIFICANT CHANGE UP (ref 2–14)
NEUTROPHILS # BLD AUTO: 14.92 K/UL — HIGH (ref 1.8–7.4)
NEUTROPHILS NFR BLD AUTO: 84.7 % — HIGH (ref 43–77)
NRBC # FLD: 0 K/UL — SIGNIFICANT CHANGE UP (ref 0–0)
PLATELET # BLD AUTO: 278 K/UL — SIGNIFICANT CHANGE UP (ref 150–400)
PMV BLD: 9.6 FL — SIGNIFICANT CHANGE UP (ref 7–13)
POTASSIUM SERPL-MCNC: 3.8 MMOL/L — SIGNIFICANT CHANGE UP (ref 3.5–5.3)
POTASSIUM SERPL-SCNC: 3.8 MMOL/L — SIGNIFICANT CHANGE UP (ref 3.5–5.3)
PROT SERPL-MCNC: 6.8 G/DL — SIGNIFICANT CHANGE UP (ref 6–8.3)
PROTHROM AB SERPL-ACNC: 11.4 SEC — SIGNIFICANT CHANGE UP (ref 9.8–13.1)
RBC # BLD: 4.89 M/UL — SIGNIFICANT CHANGE UP (ref 4.2–5.8)
RBC # FLD: 12.4 % — SIGNIFICANT CHANGE UP (ref 10.3–14.5)
RH IG SCN BLD-IMP: POSITIVE — SIGNIFICANT CHANGE UP
SODIUM SERPL-SCNC: 126 MMOL/L — LOW (ref 135–145)
WBC # BLD: 17.6 K/UL — HIGH (ref 3.8–10.5)
WBC # FLD AUTO: 17.6 K/UL — HIGH (ref 3.8–10.5)

## 2019-09-12 PROCEDURE — 70470 CT HEAD/BRAIN W/O & W/DYE: CPT | Mod: 26

## 2019-09-12 PROCEDURE — 99223 1ST HOSP IP/OBS HIGH 75: CPT | Mod: GC

## 2019-09-12 PROCEDURE — 99203 OFFICE O/P NEW LOW 30 MIN: CPT | Mod: 25,GC

## 2019-09-12 RX ORDER — ONDANSETRON 4 MG/1
4 TABLET, ORALLY DISINTEGRATING ORAL
Qty: 0 | Refills: 0 | Status: COMPLETED | OUTPATIENT
Start: 2019-09-12

## 2019-09-12 RX ORDER — IPRATROPIUM BROMIDE AND ALBUTEROL SULFATE 2.5; .5 MG/3ML; MG/3ML
0.5-2.5 (3) SOLUTION RESPIRATORY (INHALATION)
Refills: 0 | Status: ACTIVE | COMMUNITY
Start: 2019-09-12

## 2019-09-12 RX ORDER — DEXAMETHASONE 0.5 MG/5ML
10 ELIXIR ORAL ONCE
Refills: 0 | Status: DISCONTINUED | OUTPATIENT
Start: 2019-09-12 | End: 2019-09-12

## 2019-09-12 RX ORDER — DEXAMETHASONE 4 MG/1
4 TABLET ORAL EVERY 6 HOURS
Refills: 0 | Status: ACTIVE | COMMUNITY
Start: 2019-09-12

## 2019-09-12 RX ORDER — DEXAMETHASONE 0.5 MG/5ML
10 ELIXIR ORAL ONCE
Refills: 0 | Status: COMPLETED | OUTPATIENT
Start: 2019-09-12 | End: 2019-09-12

## 2019-09-12 RX ADMIN — Medication 102 MILLIGRAM(S): at 20:51

## 2019-09-12 NOTE — H&P ADULT - NSICDXPASTMEDICALHX_GEN_ALL_CORE_FT
PAST MEDICAL HISTORY:  Brain metastases     Chronic GERD     Metastatic lung cancer (metastasis from lung to other site)

## 2019-09-12 NOTE — ED PROVIDER NOTE - CARE PLAN
Principal Discharge DX:	Dizziness Principal Discharge DX:	Brain metastases  Secondary Diagnosis:	Lung cancer metastatic to brain  Secondary Diagnosis:	Palliative care encounter  Secondary Diagnosis:	Nausea  Secondary Diagnosis:	Dizziness

## 2019-09-12 NOTE — CONSULT NOTE ADULT - PROBLEM SELECTOR RECOMMENDATION 9
- no neurosurgical intervention warranted.  - c/w decadron 4q6h  - c/w keppra  - Rad/onc f/u    d/w attending.

## 2019-09-12 NOTE — ED PROVIDER NOTE - PMH
Brain metastases    Metastatic lung cancer (metastasis from lung to other site) Brain metastases    Chronic GERD    Metastatic lung cancer (metastasis from lung to other site)

## 2019-09-12 NOTE — H&P ADULT - NSHPREVIEWOFSYSTEMS_GEN_ALL_CORE
REVIEW OF SYSTEMS:    CONSTITUTIONAL: + weakness, fatigue, malaise, fevers or chills, no weight change, appetite change  EYES: + blurry vision  Ears: no otalgia, no otorhea, no hearing loss, tinnitus  Nose: no epistaxis, rhinorrhea, post-discharge, sinus pressure  Throat: no throat pain, no oral lesions, tooth pain   NECK: No pain or stiffness  RESPIRATORY: No cough (productive or dry), wheezing, hemoptysis; No shortness of breath, orthnopnea, PND, CLINE, snoring,  CARDIOVASCULAR: No chest pain or palpitations, no leg edema, no claudication    GASTROINTESTINAL: No abdominal or epigastric pain. No nausea, vomiting, or hematemesis; No diarrhea or constipation. No melena or hematochezia.  GENITOURINARY: No dysuria, frequency, urgency or hematuria, no pelvic pain, urinary incontinence, urgency  Musculoskeletal: no joints or muscle pain, no swelling in joints or muscles  NEUROLOGICAL: No numbness or weakness, headache, memory loss, seizures, dizziness, vertigo, syncope, ataxia  SKIN: No pruritis, rashes, lesions or new moles  Psych: No anxiety, sadness, insomnia, suicide thoughts  Endocrine: No Heat or Cold intolerance, polydipsia, polyphagia  Heme/Lymph: no LN enlargement, no easy bruising or bleeding REVIEW OF SYSTEMS:    CONSTITUTIONAL: + weakness, fatigue, malaise, fevers or chills, no weight change, appetite change  EYES: + blurry vision  Ears: no otalgia   Nose: no rhinorrhea,  sinus pressure  Throat: no throat pain, no oral lesions, tooth pain   NECK: No pain or stiffness  RESPIRATORY: No cough (productive or dry), wheezing, hemoptysis; No shortness of breath, orthopnea   CARDIOVASCULAR: No chest pain or palpitations, no leg edema    GASTROINTESTINAL: No abdominal or epigastric pain. No nausea, vomiting, or hematemesis; No diarrhea or constipation. No melena or hematochezia.  GENITOURINARY: No dysuria, frequency, urgency or hematuria, no pelvic pain, urinary incontinence, urgency  Musculoskeletal: no joints or muscle pain, no swelling in joints or muscles  NEUROLOGICAL: per HPI  SKIN: No pruritis, rashes   Psych: No anxiety, sadness   Endocrine: No Heat or Cold intolerance, polydipsia, polyphagia  Heme/Lymph: no easy bruising or bleeding Constitutional Symptoms: +weakness, fatigue, no fevers, chills  Eyes: +headache, blurry vision  Ears, Nose, Mouth, Throat: +vertigo, no  sinus pain, ear pain  Cardiovascular: No chest pain, palpitations, edema  Respiratory: No cough, wheezing, hemoptysis, shortness of breath  Gastrointestinal: No abdominal pain, dysphagia, anorexia, nausea/vomiting, diarrhea/constipation, hematemesis, BRBPR, melena  Genitourinary: No dysuria, frequency, hematuria  Musculoskeletal: No joint pain, joint swelling, decreased ROM  Skin: No pruritus, rashes, lesions, wounds  Neurologic:  +HA, no seizures, headache, paraesthesia, numbness, limb weakness    Positives and pertinent negatives noted and all other systems negative.

## 2019-09-12 NOTE — CONSULT NOTE ADULT - ASSESSMENT
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 Ct head stable brain mets no hydrocephalus.

## 2019-09-12 NOTE — H&P ADULT - PROBLEM SELECTOR PLAN 1
Pt diagnosed on recent admission 8/28-9/5. 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  - oncology and radiation oncology c/s in am  - consider palliative consult for continued GOC and sx management Pt diagnosed on recent admission 8/28-9/5. 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 RUL EMNB-guided biopsy and FNA performed 8/29/19, positive for malignant cells. Molecular testing is pending.   - oncology and radiation oncology c/s in am  - consider palliative consult for continued GOC and sx management Pt found to have brain lesions in July 2019, likely 2/2 NSCLC. Now came to ED with worsening vertigo, n/v w/ concern for hydrocephalus. CT H redemonstrated multiple intracranial metastases, largest 9.3 cm in the R frontal region, but also with numerous cerebellar lesions.  - pt evaluated by Neurosurgery in the ED, not candidate for surgery  - oncology and rad onc c/s to coordinate possible inpatient radiation treatment, pt was planned for gamma knife   - c/w decadron 4 mg q6 hr  - c/w Keppra 500 mg bid for seizure prophylaxis  - PPI prophylaxis  - c/w zofran and meclizine for n/v and vertigo management  - fall precautions  - neuro checks

## 2019-09-12 NOTE — CONSULT NOTE ADULT - SUBJECTIVE AND OBJECTIVE BOX
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 patient was dc from the hospital on friday on decadron 4q6 and keppra was seen by Dr. tomlin at [facility] recommending whole brain RT. per daughter patient has been c/o increased headaches, lethargy, nausea, blurry vision since monday. he was sent from [facility] to ED for repeat ct head  and evaluation by neurosurgery incase hydrocephalus.     PAST MEDICAL & SURGICAL HISTORY:  Brain metastases  Metastatic lung cancer (metastasis from lung to other site)  No significant past surgical history    Allergies    No Known Allergies    Intolerances      MEDICATIONS  (STANDING):    MEDICATIONS  (PRN):    Vital Signs Last 24 Hrs  T(C): 36.7 (12 Sep 2019 18:44), Max: 36.7 (12 Sep 2019 17:22)  T(F): 98 (12 Sep 2019 18:44), Max: 98 (12 Sep 2019 17:22)  HR: 67 (12 Sep 2019 21:10) (62 - 67)  BP: 123/70 (12 Sep 2019 21:10) (123/70 - 127/83)  BP(mean): --  RR: 18 (12 Sep 2019 21:10) (18 - 24)  SpO2: 100% (12 Sep 2019 21:10) (99% - 100%)    PE:  AA&0 x 3, CN 2-12 grossly intact, speach clear, follows commands, PERRL  Motor: strength : BUE antigravity, b/l LE antigravity  Sensory: intact to light touch      LABS:                        14.1   17.60 )-----------( 278      ( 12 Sep 2019 18:18 )             40.5     09-12    126<L>  |  87<L>  |  21  ----------------------------<  154<H>  3.8   |  22  |  0.72    Ca    9.3      12 Sep 2019 18:18    TPro  6.8  /  Alb  4.1  /  TBili  0.6  /  DBili  x   /  AST  10  /  ALT  28  /  AlkPhos  87  09-12    PT/INR - ( 12 Sep 2019 18:18 )   PT: 11.4 SEC;   INR: 1.03          PTT - ( 12 Sep 2019 18:18 )  PTT:25.0 SEC      Redemonstration of multiple intracranial metastases, as described below:     9.3 cm high right frontal lesion (7:24)   1.3 cm posterior left parieto-occipital lesion with associated vasogenic   edema (7:18)   9 mm left parafalcine lesion (7:18)   1.9 cm centrally necrotic superior cerebellar lesion(7:11)    6 mm right inferior frontal lesion (7:9)  1.9 cm right cerebellar lesion (7:8)   2.3 cm inferior cerebellar lesion at midline bilobed, likely due two   adjacent lesions (7:5)   8 mm right cerebellar lesion (7:5)    No evidence of acute intracranial hemorrhage, hydrocephalus, or   herniation. No extra-axial collection.     The ventricles and sulci are within normal limits.  The visualized paranasal sinuses are clear. The mastoid air cells are   clear.  The calvarium is intact. The soft tissues of the scalp are unremarkable.    IMPRESSION:     Multiple intracranial metastases which have not changed significantly   from the prior MR scan.. No acute intracranial hemorrhage, hydrocephalus,   or evidence of herniation.

## 2019-09-12 NOTE — ED PROVIDER NOTE - CLINICAL SUMMARY MEDICAL DECISION MAKING FREE TEXT BOX
Worsening neuro symptoms with recently diagnosed brain mets from non small cell lung CA. Plan: IV decadron, continue Keppra, symptomatic treatment Zofran analgesia, labs CT head, will notify NS

## 2019-09-12 NOTE — ASSESSMENT
[FreeTextEntry1] : Non-small cell lung cancer with adenocarcinoma histology that is metastatic to the brain. The patient appears to have minimal disease burden outside of the CNS however he is very symptomatic from his brain metastases. I have made the following recommendations to the patient and his family, including his daughter who is a physician in [facility]:\par -Continue dexamethasone 4 mg p.o. q.6 hours. I have added a PPI for GI prophylaxis given the steroid dosing.\par -The patient has a radiation oncology outpatient appointment on 9/12/19. The patient will need Brain RT first given his extreme symptomatology. Discussed that should his clinical status worsen, he may need to go back to the hospital where he would be treated as an inpatient.\par -Recommended palliative care consultation for global symptom management. The patient was seen by the palliative care in the hospital, will help coordinate outpatient followup\par -Recommend Neuro-oncology evaluation given his significant vertigo and overall CNS symptoms\par -His tumor tested PDL1 negative indicating that single agent immunotherapy would not be appropriate in the first-line setting. Molecular testing is pending on his biopsy specimen however this is unlikely to reveal an actionable mutation given his prior smoking history. He needs to have his CNS metastases addressed first. Recommend he followup here while he is getting RT where we will hopefully have the molecular testing results and can decide and arrange for the start of appropriate palliative systemic therapy. \par -The lung nodules are likely metastases and can be monitored on subsequent imaging\par -Patient deconditioned and would benefit from Homecare evaluation.  He is also unable to ambulate due to his symptoms and requires a wheelchair.

## 2019-09-12 NOTE — H&P ADULT - HISTORY OF PRESENT ILLNESS
Pt is a 69 yo w/ PMHx recently diagnosed NSCLC with mets to brain, presenting with worsening generalized HA, blurry vision, and vertigo since 8/9. Pt was hospitalized at [facility] 8/28-9/5 for further work up of brain lesions initially found on imaging in [facility]. Pt had previously been experiencing worsening vertigo and ataxia for a year. The pt was started on Keppra, decadron, meclizine, zofran with initial improvement in his symptoms but pt eventually required uptitration of decadron. Pt was deemed not a surgical candidate due to extensive number of brain lesions. However, since 8/9, pt has had worsening generalized HA, numerous episodes of n/v, weakness, vertigo, and blurry vision. Pt states he has not been able to walk on account      In ED: VS Temp 98, HR 62, /83, RR 24 100% SpO2 on RA. WBC 17.6, Na 126, Cl 87, AG 17, CT H showing multiple intracranial metastases with no interval change. Pt s/p decadron 10 mg IVPB Pt is a 69 yo w/ PMHx recently diagnosed NSCLC with mets to brain, presenting with worsening generalized HA, blurry vision, and vertigo since 8/9. Pt was hospitalized at [facility] 8/28-9/5 for further work up of brain lesions initially found on imaging in [facility]. Pt had previously been experiencing ataxia for a year and worsening vertigo starting July 2019 which prompted him to go for brain imaging in [facility]. During his admission at [facility], the pt was started on Keppra, decadron, meclizine, zofran with initial improvement in his symptoms but pt eventually required uptitration of decadron. Pt was deemed not a neurosurgical candidate due to extensive number of brain lesions. However, since 8/9, pt has had worsening generalized HA, numerous episodes of n/v, weakness, vertigo, and blurry vision. Pt also states he has not been able to walk on account increasing LE weakness. On 8/12, pt saw Dr. Poole (radiation oncology) who recommended pt go to ED due to concern for hydrocephalus.    In ED: VS Temp 98, HR 62, /83, RR 24 100% SpO2 on RA. WBC 17.6, Na 126, Cl 87, AG 17, CT H showing multiple intracranial metastases with no interval change. Pt s/p decadron 10 mg IVPB Pt is a 67 yo w/ PMHx recently diagnosed NSCLC with mets to brain, presenting with worsening generalized HA, blurry vision, and vertigo since 8/9. Pt was hospitalized at [facility] 8/28-9/5 for further work up of brain lesions initially found on imaging in [facility]. Pt had previously been experiencing ataxia for a year and worsening vertigo starting July 2019 which prompted him to go for brain imaging in [facility]. During his admission at [facility], the pt was started on Keppra, decadron, meclizine, zofran with initial improvement in his symptoms but pt eventually required uptitration of decadron. Pt was deemed not a neurosurgical candidate due to extensive number of brain lesions. However, since 8/9, pt has had worsening generalized HA, numerous episodes of n/v, weakness, vertigo, and blurry vision. Pt also states he has not been able to walk on account increasing LE weakness. On 8/12, pt saw Dr. Poole (radiation oncology) who recommended pt go to ED due to concern for hydrocephalus. He currently denies CP, SOB, f/c, cough.    In ED: VS Temp 98, HR 62, /83, RR 24 100% SpO2 on RA. WBC 17.6, Na 126, Cl 87, AG 17, CT H showing multiple intracranial metastases with no interval change. Pt s/p decadron 10 mg IVPB Pt is a 67 yo w/ PMHx recently diagnosed NSCLC with mets to brain, presenting with worsening generalized HA, blurry vision, and vertigo since 9/9. Pt was hospitalized at [facility] 8/28-9/5 for further work up of brain lesions initially found on imaging in [facility]. Pt had previously been experiencing ataxia for a year and worsening vertigo starting July 2019 which prompted him to go for brain imaging in [facility]. During his admission at [facility], the pt was started on Keppra, decadron, meclizine, zofran with initial improvement in his symptoms but pt eventually required uptitration of decadron. Pt was deemed not a neurosurgical candidate due to extensive number of brain lesions. CT chest demonstrated b/l solid pulmonary nodules in addition to GGOs. Pt underwent RUL EMNB-guided biopsy and FNA performed 8/29/19, positive for malignant cells.     However, since 8/9, pt has had worsening generalized HA, numerous episodes of n/v, weakness, vertigo, and blurry vision. Pt also states he has not been able to walk on account increasing LE weakness. On 8/12, pt saw Dr. Poole (radiation oncology) who recommended pt go to ED due to concern for hydrocephalus. He currently denies CP, SOB, f/c, cough.    In ED: VS Temp 98, HR 62, /83, RR 24 100% SpO2 on RA. WBC 17.6, Na 126, Cl 87, AG 17, CT H showing multiple intracranial metastases with no interval change. Pt s/p decadron 10 mg IVPB

## 2019-09-12 NOTE — H&P ADULT - ASSESSMENT
Pt is a 69 yo w/ PMHx recently diagnosed NSCLC with mets to brain, presenting with worsening generalized HA, blurry vision, and vertigo since 8/9. CT H redemonstrates numerous metastases without significant interval change. Pt is a 67 yo w/ PMHx recently diagnosed NSCLC with mets to brain, presenting with worsening generalized HA, blurry vision, and vertigo since 8/9. CT H redemonstrates numerous metastases without significant interval change with no evidence of hydrocephalus. Pt awaiting oncology and rad onc evaluation for inpatient RT. 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 CT H redemonstrates numerous metastases without significant interval change with no evidence of hydrocephalus. Pt awaiting oncology and rad onc evaluation for inpatient RT.

## 2019-09-12 NOTE — PHYSICAL EXAM
[Thin] : thin [Sclera] : the sclera and conjunctiva were normal [Outer Ear] : the ears and nose were normal in appearance [Hearing Threshold Finger Rub Not [place]] : hearing was normal [Normal] : normal skin color and pigmentation and no rash [Oriented To Time, Place, And Person] : oriented to person, place, and time

## 2019-09-12 NOTE — H&P ADULT - PROBLEM SELECTOR PLAN 2
- pt evaluated by Neurosurgery in the ED, not candidate for surgery  - oncology and rad onc c/s to coordinate possible inpatient radiation treatment, pt was planned for gamma knife   - c/w decadron 4 mg q6 hr  - c/w Keppra 500 mg bid for seizure prophylaxis  - c/w zofran and meclizine for n/v and vertigo management  - fall precautions Pt found to have brain lesions in July 2019, likely 2/2 NSCLC. Now came to ED with worsening vertigo, n/v w/ concern for hydrocephalus. CT H redemonstrated multiple intracranial metastases, largest 9.3 cm in the R frontal region, but also with numerous cerebellar lesions.  - pt evaluated by Neurosurgery in the ED, not candidate for surgery  - oncology and rad onc c/s to coordinate possible inpatient radiation treatment, pt was planned for gamma knife   - c/w decadron 4 mg q6 hr  - c/w Keppra 500 mg bid for seizure prophylaxis  - PPI prophylaxis  - c/w zofran and meclizine for n/v and vertigo management  - fall precautions Pt diagnosed on recent admission 8/28-9/5. 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 RUL EMNB-guided biopsy and FNA performed 8/29/19, positive for malignant cells. Molecular testing is pending.   - oncology and radiation oncology c/s in am  - consider palliative consult for continued GOC and sx management Pt diagnosed on recent admission 8/28-9/5. S/p RUL EMNB-guided biopsy and FNA performed 8/29/19, positive for malignant cells. Molecular testing is pending.   - oncology and radiation oncology c/s in am  - consider palliative consult for continued GOC and sx management

## 2019-09-12 NOTE — H&P ADULT - PROBLEM SELECTOR PLAN 5
DVT ppx: Lovenox 40 mg qd  Diet: Clear liquid w/ ensurex2 and fluid restriction to 1000cc  Full code

## 2019-09-12 NOTE — H&P ADULT - NSHPLABSRESULTS_GEN_ALL_CORE
LABS:                        14.1   17.60 )-----------( 278      ( 12 Sep 2019 18:18 )             40.5     09-12    126<L>  |  87<L>  |  21  ----------------------------<  154<H>  3.8   |  22  |  0.72    Ca    9.3      12 Sep 2019 18:18    TPro  6.8  /  Alb  4.1  /  TBili  0.6  /  DBili  x   /  AST  10  /  ALT  28  /  AlkPhos  87  09-12    PT/INR - ( 12 Sep 2019 18:18 )   PT: 11.4 SEC;   INR: 1.03          PTT - ( 12 Sep 2019 18:18 )  PTT:25.0 SEC    CAPILLARY BLOOD GLUCOSE    RADIOLOGY & ADDITIONAL TESTS:    EKG: bradycardia. LABS:                        14.1   17.60 )-----------( 278      ( 12 Sep 2019 18:18 )             40.5     09-12    126<L>  |  87<L>  |  21  ----------------------------<  154<H>  3.8   |  22  |  0.72    Ca    9.3      12 Sep 2019 18:18    TPro  6.8  /  Alb  4.1  /  TBili  0.6  /  DBili  x   /  AST  10  /  ALT  28  /  AlkPhos  87  09-12    PT/INR - ( 12 Sep 2019 18:18 )   PT: 11.4 SEC;   INR: 1.03          PTT - ( 12 Sep 2019 18:18 )  PTT:25.0 SEC    CAPILLARY BLOOD GLUCOSE    RADIOLOGY & ADDITIONAL TESTS:    EKG: sinus bradycardia. LABS:                        14.1   17.60 )-----------( 278      ( 12 Sep 2019 18:18 )             40.5     09-12    126<L>  |  87<L>  |  21  ----------------------------<  154<H>  3.8   |  22  |  0.72    Ca    9.3      12 Sep 2019 18:18    TPro  6.8  /  Alb  4.1  /  TBili  0.6  /  DBili  x   /  AST  10  /  ALT  28  /  AlkPhos  87  09-12    PT/INR - ( 12 Sep 2019 18:18 )   PT: 11.4 SEC;   INR: 1.03          PTT - ( 12 Sep 2019 18:18 )  PTT:25.0 SEC    CAPILLARY BLOOD GLUCOSE    RADIOLOGY & ADDITIONAL TESTS:    EKG: sinus bradycardia. .     < from: CT Head w/wo IV Cont (09.12.19 @ 20:03) >      EXAM:  CT BRAIN WAW IC        PROCEDURE DATE:  Sep 12 2019         INTERPRETATION:  CLINICAL INFORMATION: Known intracranial metastases.   Evaluate for mass effect.    TECHNIQUE: Noncontrast axial CT images were acquired through the head,   followed by contrast-enhanced CT of the head. Two-dimensional sagittal   and coronal reformats were generated.     COMPARISON STUDY: CT head dated 9/2/2019. MRI of the brain dated   8/28/2019.    FINDINGS:     Redemonstration of multiple intracranial metastases, as described below:     9.3 cm high right frontal lesion (7:24)   1.3 cm posterior left parieto-occipital lesion with associated vasogenic   edema (7:18)   9 mm left parafalcine lesion (7:18)   1.9 cm centrally necrotic superior cerebellar lesion(7:11)    6 mm right inferior frontal lesion (7:9)  1.9 cm right cerebellar lesion (7:8)   2.3 cm inferior cerebellar lesion at midline bilobed, likely due two   adjacent lesions (7:5)   8 mm right cerebellar lesion (7:5)    No evidence of acute intracranial hemorrhage, hydrocephalus, or   herniation. No extra-axial collection.     The ventricles and sulci are within normal limits.  The visualized paranasal sinuses are clear. The mastoid air cells are   clear.  The calvarium is intact. The soft tissues of the scalp are unremarkable.    IMPRESSION:     Multiple intracranial metastases which have not changed significantly   from the prior MR scan.. No acute intracranial hemorrhage, hydrocephalus,   or evidence of herniation.    < end of copied text > LABS:                        14.1   17.60 )-----------( 278      ( 12 Sep 2019 18:18 )             40.5     09-12    126<L>  |  87<L>  |  21  ----------------------------<  154<H>  3.8   |  22  |  0.72    Ca    9.3      12 Sep 2019 18:18    TPro  6.8  /  Alb  4.1  /  TBili  0.6  /  DBili  x   /  AST  10  /  ALT  28  /  AlkPhos  87  09-12    PT/INR - ( 12 Sep 2019 18:18 )   PT: 11.4 SEC;   INR: 1.03          PTT - ( 12 Sep 2019 18:18 )  PTT:25.0 SEC    CAPILLARY BLOOD GLUCOSE    RADIOLOGY & ADDITIONAL TESTS:    EKG: sinus bradycardia. .     < from: CT Head w/wo IV Cont (09.12.19 @ 20:03) >    EXAM:  CT BRAIN WAW IC      PROCEDURE DATE:  Sep 12 2019     INTERPRETATION:  CLINICAL INFORMATION: Known intracranial metastases.   Evaluate for mass effect.    TECHNIQUE: Noncontrast axial CT images were acquired through the head,   followed by contrast-enhanced CT of the head. Two-dimensional sagittal   and coronal reformats were generated.     COMPARISON STUDY: CT head dated 9/2/2019. MRI of the brain dated   8/28/2019.    FINDINGS:     Redemonstration of multiple intracranial metastases, as described below:     9.3 cm high right frontal lesion (7:24)   1.3 cm posterior left parieto-occipital lesion with associated vasogenic   edema (7:18)   9 mm left parafalcine lesion (7:18)   1.9 cm centrally necrotic superior cerebellar lesion(7:11)    6 mm right inferior frontal lesion (7:9)  1.9 cm right cerebellar lesion (7:8)   2.3 cm inferior cerebellar lesion at midline bilobed, likely due two   adjacent lesions (7:5)   8 mm right cerebellar lesion (7:5)    No evidence of acute intracranial hemorrhage, hydrocephalus, or   herniation. No extra-axial collection.     The ventricles and sulci are within normal limits.  The visualized paranasal sinuses are clear. The mastoid air cells are   clear.  The calvarium is intact. The soft tissues of the scalp are unremarkable.    IMPRESSION:     Multiple intracranial metastases which have not changed significantly   from the prior MR scan.. No acute intracranial hemorrhage, hydrocephalus,   or evidence of herniation.    < end of copied text > LABS:                        14.1   17.60 )-----------( 278      ( 12 Sep 2019 18:18 )             40.5     09-12    126<L>  |  87<L>  |  21  ----------------------------<  154<H>  3.8   |  22  |  0.72    Ca    9.3      12 Sep 2019 18:18    TPro  6.8  /  Alb  4.1  /  TBili  0.6  /  DBili  x   /  AST  10  /  ALT  28  /  AlkPhos  87  09-12    PT/INR - ( 12 Sep 2019 18:18 )   PT: 11.4 SEC;   INR: 1.03          PTT - ( 12 Sep 2019 18:18 )  PTT:25.0 SEC    CAPILLARY BLOOD GLUCOSE    RADIOLOGY & ADDITIONAL TESTS:    EKG personally reviewed, : sinus bradycardia. .     < from: CT Head w/wo IV Cont (09.12.19 @ 20:03) >    EXAM:  CT BRAIN WAW IC      PROCEDURE DATE:  Sep 12 2019     INTERPRETATION:  CLINICAL INFORMATION: Known intracranial metastases.   Evaluate for mass effect.    TECHNIQUE: Noncontrast axial CT images were acquired through the head,   followed by contrast-enhanced CT of the head. Two-dimensional sagittal   and coronal reformats were generated.     COMPARISON STUDY: CT head dated 9/2/2019. MRI of the brain dated   8/28/2019.    FINDINGS:     Redemonstration of multiple intracranial metastases, as described below:     9.3 cm high right frontal lesion (7:24)   1.3 cm posterior left parieto-occipital lesion with associated vasogenic   edema (7:18)   9 mm left parafalcine lesion (7:18)   1.9 cm centrally necrotic superior cerebellar lesion(7:11)    6 mm right inferior frontal lesion (7:9)  1.9 cm right cerebellar lesion (7:8)   2.3 cm inferior cerebellar lesion at midline bilobed, likely due two   adjacent lesions (7:5)   8 mm right cerebellar lesion (7:5)    No evidence of acute intracranial hemorrhage, hydrocephalus, or   herniation. No extra-axial collection.     The ventricles and sulci are within normal limits.  The visualized paranasal sinuses are clear. The mastoid air cells are   clear.  The calvarium is intact. The soft tissues of the scalp are unremarkable.    IMPRESSION:     Multiple intracranial metastases which have not changed significantly   from the prior MR scan.. No acute intracranial hemorrhage, hydrocephalus,   or evidence of herniation.    < end of copied text >

## 2019-09-12 NOTE — ED PROVIDER NOTE - PROGRESS NOTE DETAILS
Some improvement of symptoms. CT head results noted. NS consult appreciated. No NS intervention at this time. Plan: admitted to [facility] service.

## 2019-09-12 NOTE — PHYSICAL EXAM
[Capable of only limited self care, confined to bed or chair more than 50% of waking hours] : Status 3- Capable of only limited self care, confined to bed or chair more than 50% of waking hours [Thin] : thin [Normal] : normal appearance, no rash, nodules, vesicles, ulcers, erythema [de-identified] : No icterus  [de-identified] : MMM O/P clear  [de-identified] : Supple No LAD  [de-identified] : No edema  [de-identified] : Soft NT [de-identified] : No spine tenderness [de-identified] : Depressed

## 2019-09-12 NOTE — H&P ADULT - PROBLEM SELECTOR PLAN 4
DVT ppx: Lovenox 40 mg qd    Full code Per documentation, pt has lost 25 lbs over a 2 week period.   - nutrition c/s

## 2019-09-12 NOTE — ED ADULT NURSE NOTE - CHIEF COMPLAINT QUOTE
Patient brought to ER by EMS from [email or web address] for first consult for brain cell carcinoma. Pt has c/o blurred vision, headache, chest pain, weakness, nausea, vomiting, inability to ambulate and eat. Zofran 4mg IV and Zofran 4 sublingual given. Pt also has Vertigo. Right AC 20 gauge placed by EMS.

## 2019-09-12 NOTE — RESULTS/DATA
[FreeTextEntry1] : Reviewed:\par \par -CT Head 8/28/19:  Multiple lesions concerning for metastasis with hemorrhagic and/or proteinaceous debris as detailed above, including left parietal occipital mass with fluid fluid level with edema and mass effect, MRI with gadolinium will better assess. Basal cisterns are patent, no extra axial collection or \par midline shift. Strongly suggest improved assessment using MRI with gadolinium. \par \par -CT C/A/P 8/28/19:  Emphysema with bilateral solid pulmonary nodules measuring 2.3 cm in the \par right upper lobe and 0.7 cm in the left upper lobe. Additional groundglass and mixed solid and ground glass nodular opacities bilaterally. Pulmonary nodules are indeterminate, but may be seen in the setting of a primary lung malignancy. No intrathoracic lymphadenopathy. No solid organ lesion or lymphadenopathy in the abdomen or pelvis. \par \par -MRI Brain 8/28/19:  Multiple intraparenchymal enhancing masses several of which are associated \par with hemorrhage and/or vasogenic edema. Metastatic disease is a prime consideration. Two adjacent lesions efface the fourth ventricular lumen however no significant hydrocephalus is seen at this time. \par \par -Abdominal U/S 8/29/19:  Normal right upper quadrant abdominal ultrasound. \par \par -CT Head 9/2/19:  No significant change when allowing for differences in technique. \par \par -Bloodwork from 9/5/19:  Normal CBC.  Serum Creat 0.68

## 2019-09-12 NOTE — REASON FOR VISIT
[Initial Consultation] : an initial consultation [Spouse] : spouse [Family Member] : family member [FreeTextEntry2] : Lung Cancer

## 2019-09-12 NOTE — ED ADULT TRIAGE NOTE - CHIEF COMPLAINT QUOTE
Patient brought to ER by EMS from [facility] for first consult for brain cell carcinoma. Pt has c/o blurred vision, headache, chest pain, weakness, nausea, vomiting, inability to ambulate and eat. Zofran 4mg IV and Zofran 4 sublingual given. Pt also has Vertigo Patient brought to ER by EMS from [facility] for first consult for brain cell carcinoma. Pt has c/o blurred vision, headache, chest pain, weakness, nausea, vomiting, inability to ambulate and eat. Zofran 4mg IV and Zofran 4 sublingual given. Pt also has Vertigo. Right AC 20 gauge placed by EMS.

## 2019-09-12 NOTE — ED PROVIDER NOTE - OBJECTIVE STATEMENT
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 Treated with decadron 4mg q6, Keppra 500mg BID, meclizine, with initial improvement of headaches vision loss and dizziness. Had first onc visit at [facility] this week and saw radiation onc today at [facility]. 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 Treated with decadron 4mg q6, Keppra 500mg BID, meclizine, with initial improvement of headaches vision loss and dizziness. Had first onc visit at [facility] this week and saw radiation onc today at [facility]. Per his daughter (a physician) his symptoms have worsened since Friday with headaches, vision decrease increased dizziness and nausea and vomiting. Sent in by [facility] for CT head and NS consult, r/o increased hydrocephalus. Pt denies any other symptoms.

## 2019-09-12 NOTE — H&P ADULT - NSHPSOCIALHISTORY_GEN_ALL_CORE
Pt has a 50 yr smoking history. No hx of etoh/illicit drug use. Pt was an  in [facility]. Pt alternates between living in  and [facility]. [place] wife and daughter who is physician. Pt has a 50 yr smoking history, quit 1 month ago. No hx of etoh/illicit drug use. Pt was an  in [facility]. Pt alternates between living in  and [facility]. [place] with [place] wife and daughter who is physician. Pt has a 50 yr smoking history, quit 1 month ago. No hx of etoh/illicit drug use.  He uses a walker. Pt was an  in [facility].

## 2019-09-12 NOTE — H&P ADULT - PROBLEM SELECTOR PLAN 3
Pt hyponatremic to 126. 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 Pt responded to fluid resuscitation at that time  - c/w 1000 cc fluid restriction Pt hyponatremic to 126. 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 Pt responded to fluid restriction at that time  - c/w 1000 cc fluid restriction  - f/u urine Na, urine Cr, U osm, serum osm

## 2019-09-12 NOTE — H&P ADULT - NSHPPHYSICALEXAM_GEN_ALL_CORE
PHYSICAL EXAM:  GENERAL: NAD, well-groomed, well-developed  HEAD:  Atraumatic, Normocephalic  EYES: unable to fully assess EOM as pt developed vertigo during exam. visual fields intact. PERRLA, conjunctiva and sclera clear.   ENMT: No tonsillar erythema, exudates, or enlargement; Moist mucous membranes  NECK: Supple   HEART: Regular rate and rhythm; No murmurs, rubs, or gallops  RESPIRATORY: CTA B/L, No W/R/R  ABDOMEN: Soft, Nontender, Nondistended; Bowel sounds present  NEUROLOGY: A&Ox3, nonfocal, 5/5 strength in b/l UE and LE, sensation intact in UE and LE  EXTREMITIES:  2+ Peripheral Pulses, No clubbing, cyanosis, or edema  SKIN: warm, dry, normal color, no rash or abnormal lesions PHYSICAL EXAM:  GENERAL: NAD, well-groomed  HEAD:  Atraumatic, Normocephalic  EYES: unable to fully assess EOM as pt developed vertigo during exam. visual fields intact. PERRLA, conjunctiva and sclera clear.   ENMT: No tonsillar erythema, exudates, or enlargement; Moist mucous membranes  NECK: Supple   HEART: Regular rate and rhythm; No murmurs, rubs, or gallops  RESPIRATORY: CTA B/L, No W/R/R  ABDOMEN: Soft, Nontender, Nondistended; Bowel sounds present  NEUROLOGY: A&Ox3, nonfocal, 5/5 strength in b/l UE and LE, sensation intact in UE and LE  EXTREMITIES:  2+ Peripheral Pulses, No clubbing, cyanosis, or edema  SKIN: warm, dry, normal color, no rash or abnormal lesions T(C): 36.4 (09-13-19 @ 03:18), Max: 36.7 (09-12-19 @ 17:22)  HR: 66 (09-13-19 @ 03:18) (62 - 76)  BP: 118/74 (09-13-19 @ 03:18) (110/64 - 127/83)  RR: 18 (09-13-19 @ 03:18) (14 - 24)  SpO2: 100% (09-13-19 @ 03:18) (99% - 100%)    Constitutional: NAD, well-developed, well-nourished  Ears, Nose, Mouth, and Throat: No tonsillar erythema, exudates, or enlargement; Moist mucous membranes  Eyes: unable to fully assess EOM as pt developed vertigo during exam. visual fields intact. PERRLA, conjunctiva and sclera clear.   Neck: supple, no JVD  Respiratory: Clear to auscultation bilaterally. No wheezes, rales or rhonchi. Normal respiratory effort  Cardiovascular: RRR, no M/R/G, no edema, 2+ Peripheral Pulses  Gastrointestinal: soft, nontender, nondistended, +BS, no hernia  Skin: warm, dry, no rash  Neurologic: sensation grossly intact, CN grossly intact, non-focal exam  Musculoskeletal: no clubbing, no cyanosis, no joint swelling  Psychiatric: AOX3, appropriate mood, affect

## 2019-09-12 NOTE — ED ADULT NURSE NOTE - OBJECTIVE STATEMENT
Pt presenting to room 1 2 AxOx4 ambulatory at baseline with a walker with c/o dizziness, lightheadedness, N/V, CP, weakness, blurry vision x1 week. PMH of lung ca with metastases to brain, pt not on chemo. On arrival to ED pt's breathing is even and unlabored, pt satting well on RA. Pt appears comfortable, is c/o worsening dizziness with movement. Abdomen soft and nondistended. No active vomiting noted at this time. Pt denies fevers, chills, CP, SOB, diarrhea. IV established by EMS in [facility], pt on cardiac monitor- NSR, labs drawn and sent, MD at bedside, will continue to monitor.

## 2019-09-12 NOTE — HISTORY OF PRESENT ILLNESS
[Disease: _____________________] : Disease: [unfilled] [AJCC Stage: ____] : AJCC Stage: [unfilled] [de-identified] : The patient is a former smoker who was in his usual state of health until July 2019 when he began to experience dizziness.  In late July 2019, his symptoms worsened. In mid-August 2019, the dizziness was associated with vomiting. He was taken to the hospital in [facility] where he was found to have multiple brain lesions consistent with metastatic disease. [place] way to [place] and is now status post admission at [facility] from 8/28/19-9/6/19. 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 Upon symptomatic improvement with steroids, he was discharged home with outpatient medical oncology and radiation oncology appointments.\par \par His main complaint is vertigo symptoms. Even slight movement of his head can result in vomiting. He has mild headaches for which Tylenol helps. He denies any cough. He has dyspnea on exertion. He has lost 25 pounds over the past 2 weeks.  He presents for today's visit in a wheelchair sitting very still for fear of vertigo.  \par \par All other ROS otherwise as outlined or noncontributory. [de-identified] : -Lung, RUL BAL 8/29/19: Positive for malignant cells. Non-small cell carcinoma, favor adenocarcinoma.\par -Lung, RUL EMNB-guided biopsy and FNA 8/29/19: Positive for malignant cells. Non-small cell carcinoma favor adenocarcinoma.  PD L1 negative at 0%. Molecular testing is pending.

## 2019-09-13 DIAGNOSIS — C34.90 MALIGNANT NEOPLASM OF UNSPECIFIED PART OF UNSPECIFIED BRONCHUS OR LUNG: ICD-10-CM

## 2019-09-13 DIAGNOSIS — R53.81 OTHER MALAISE: ICD-10-CM

## 2019-09-13 DIAGNOSIS — Z29.9 ENCOUNTER FOR PROPHYLACTIC MEASURES, UNSPECIFIED: ICD-10-CM

## 2019-09-13 DIAGNOSIS — R11.0 NAUSEA: ICD-10-CM

## 2019-09-13 DIAGNOSIS — E46 UNSPECIFIED PROTEIN-CALORIE MALNUTRITION: ICD-10-CM

## 2019-09-13 DIAGNOSIS — Z51.5 ENCOUNTER FOR PALLIATIVE CARE: ICD-10-CM

## 2019-09-13 DIAGNOSIS — E87.1 HYPO-OSMOLALITY AND HYPONATREMIA: ICD-10-CM

## 2019-09-13 DIAGNOSIS — Z98.890 OTHER SPECIFIED POSTPROCEDURAL STATES: Chronic | ICD-10-CM

## 2019-09-13 PROBLEM — C79.31 BRAIN METASTASES: Status: ACTIVE | Noted: 2019-09-10

## 2019-09-13 LAB
ALBUMIN SERPL ELPH-MCNC: 3.9 G/DL — SIGNIFICANT CHANGE UP (ref 3.3–5)
ALP SERPL-CCNC: 86 U/L — SIGNIFICANT CHANGE UP (ref 40–120)
ALT FLD-CCNC: 26 U/L — SIGNIFICANT CHANGE UP (ref 4–41)
ANION GAP SERPL CALC-SCNC: 14 MMO/L — SIGNIFICANT CHANGE UP (ref 7–14)
AST SERPL-CCNC: 11 U/L — SIGNIFICANT CHANGE UP (ref 4–40)
BILIRUB SERPL-MCNC: 0.6 MG/DL — SIGNIFICANT CHANGE UP (ref 0.2–1.2)
BUN SERPL-MCNC: 22 MG/DL — SIGNIFICANT CHANGE UP (ref 7–23)
CALCIUM SERPL-MCNC: 9.4 MG/DL — SIGNIFICANT CHANGE UP (ref 8.4–10.5)
CHLORIDE SERPL-SCNC: 90 MMOL/L — LOW (ref 98–107)
CO2 SERPL-SCNC: 23 MMOL/L — SIGNIFICANT CHANGE UP (ref 22–31)
CREAT SERPL-MCNC: 0.68 MG/DL — SIGNIFICANT CHANGE UP (ref 0.5–1.3)
GLUCOSE SERPL-MCNC: 165 MG/DL — HIGH (ref 70–99)
HCT VFR BLD CALC: 40.2 % — SIGNIFICANT CHANGE UP (ref 39–50)
HGB BLD-MCNC: 13.9 G/DL — SIGNIFICANT CHANGE UP (ref 13–17)
MAGNESIUM SERPL-MCNC: 2.3 MG/DL — SIGNIFICANT CHANGE UP (ref 1.6–2.6)
MCHC RBC-ENTMCNC: 28.7 PG — SIGNIFICANT CHANGE UP (ref 27–34)
MCHC RBC-ENTMCNC: 34.6 % — SIGNIFICANT CHANGE UP (ref 32–36)
MCV RBC AUTO: 82.9 FL — SIGNIFICANT CHANGE UP (ref 80–100)
NRBC # FLD: 0 K/UL — SIGNIFICANT CHANGE UP (ref 0–0)
OSMOLALITY SERPL: 278 MOSMO/KG — SIGNIFICANT CHANGE UP (ref 275–295)
PHOSPHATE SERPL-MCNC: 4 MG/DL — SIGNIFICANT CHANGE UP (ref 2.5–4.5)
PLATELET # BLD AUTO: 258 K/UL — SIGNIFICANT CHANGE UP (ref 150–400)
PMV BLD: 9.3 FL — SIGNIFICANT CHANGE UP (ref 7–13)
POTASSIUM SERPL-MCNC: 4.2 MMOL/L — SIGNIFICANT CHANGE UP (ref 3.5–5.3)
POTASSIUM SERPL-SCNC: 4.2 MMOL/L — SIGNIFICANT CHANGE UP (ref 3.5–5.3)
PROT SERPL-MCNC: 6.8 G/DL — SIGNIFICANT CHANGE UP (ref 6–8.3)
RBC # BLD: 4.85 M/UL — SIGNIFICANT CHANGE UP (ref 4.2–5.8)
RBC # FLD: 12.3 % — SIGNIFICANT CHANGE UP (ref 10.3–14.5)
SODIUM SERPL-SCNC: 127 MMOL/L — LOW (ref 135–145)
WBC # BLD: 14.45 K/UL — HIGH (ref 3.8–10.5)
WBC # FLD AUTO: 14.45 K/UL — HIGH (ref 3.8–10.5)

## 2019-09-13 PROCEDURE — 99223 1ST HOSP IP/OBS HIGH 75: CPT | Mod: GC

## 2019-09-13 PROCEDURE — 77262 THER RADIOLOGY TX PLNG INTRM: CPT

## 2019-09-13 PROCEDURE — 77334 RADIATION TREATMENT AID(S): CPT | Mod: 26

## 2019-09-13 PROCEDURE — 99233 SBSQ HOSP IP/OBS HIGH 50: CPT | Mod: GC

## 2019-09-13 PROCEDURE — 77290 THER RAD SIMULAJ FIELD CPLX: CPT | Mod: 26

## 2019-09-13 RX ORDER — PANTOPRAZOLE SODIUM 20 MG/1
40 TABLET, DELAYED RELEASE ORAL
Refills: 0 | Status: DISCONTINUED | OUTPATIENT
Start: 2019-09-13 | End: 2019-09-30

## 2019-09-13 RX ORDER — MECLIZINE HCL 12.5 MG
25 TABLET ORAL
Refills: 0 | Status: DISCONTINUED | OUTPATIENT
Start: 2019-09-13 | End: 2019-09-30

## 2019-09-13 RX ORDER — QUETIAPINE FUMARATE 200 MG/1
25 TABLET, FILM COATED ORAL AT BEDTIME
Refills: 0 | Status: DISCONTINUED | OUTPATIENT
Start: 2019-09-13 | End: 2019-09-30

## 2019-09-13 RX ORDER — ONDANSETRON 8 MG/1
4 TABLET, FILM COATED ORAL EVERY 6 HOURS
Refills: 0 | Status: DISCONTINUED | OUTPATIENT
Start: 2019-09-13 | End: 2019-09-30

## 2019-09-13 RX ORDER — HYDROMORPHONE HYDROCHLORIDE 2 MG/ML
0.5 INJECTION INTRAMUSCULAR; INTRAVENOUS; SUBCUTANEOUS EVERY 4 HOURS
Refills: 0 | Status: DISCONTINUED | OUTPATIENT
Start: 2019-09-13 | End: 2019-09-13

## 2019-09-13 RX ORDER — LEVETIRACETAM 250 MG/1
500 TABLET, FILM COATED ORAL
Refills: 0 | Status: DISCONTINUED | OUTPATIENT
Start: 2019-09-13 | End: 2019-09-30

## 2019-09-13 RX ORDER — POLYETHYLENE GLYCOL 3350 17 G/17G
17 POWDER, FOR SOLUTION ORAL
Refills: 0 | Status: DISCONTINUED | OUTPATIENT
Start: 2019-09-13 | End: 2019-09-30

## 2019-09-13 RX ORDER — DEXAMETHASONE 0.5 MG/5ML
4 ELIXIR ORAL EVERY 6 HOURS
Refills: 0 | Status: DISCONTINUED | OUTPATIENT
Start: 2019-09-13 | End: 2019-09-30

## 2019-09-13 RX ORDER — ENOXAPARIN SODIUM 100 MG/ML
40 INJECTION SUBCUTANEOUS DAILY
Refills: 0 | Status: DISCONTINUED | OUTPATIENT
Start: 2019-09-13 | End: 2019-09-26

## 2019-09-13 RX ORDER — LANOLIN ALCOHOL/MO/W.PET/CERES
3 CREAM (GRAM) TOPICAL AT BEDTIME
Refills: 0 | Status: DISCONTINUED | OUTPATIENT
Start: 2019-09-13 | End: 2019-09-30

## 2019-09-13 RX ORDER — ACETAMINOPHEN 500 MG
650 TABLET ORAL EVERY 6 HOURS
Refills: 0 | Status: DISCONTINUED | OUTPATIENT
Start: 2019-09-13 | End: 2019-09-30

## 2019-09-13 RX ADMIN — Medication 4 MILLIGRAM(S): at 23:03

## 2019-09-13 RX ADMIN — Medication 650 MILLIGRAM(S): at 12:24

## 2019-09-13 RX ADMIN — Medication 0.25 MILLIGRAM(S): at 23:03

## 2019-09-13 RX ADMIN — Medication 4 MILLIGRAM(S): at 06:23

## 2019-09-13 RX ADMIN — Medication 0.25 MILLIGRAM(S): at 17:10

## 2019-09-13 RX ADMIN — Medication 25 MILLIGRAM(S): at 06:23

## 2019-09-13 RX ADMIN — Medication 0.25 MILLIGRAM(S): at 13:23

## 2019-09-13 RX ADMIN — QUETIAPINE FUMARATE 25 MILLIGRAM(S): 200 TABLET, FILM COATED ORAL at 23:03

## 2019-09-13 RX ADMIN — Medication 650 MILLIGRAM(S): at 13:19

## 2019-09-13 RX ADMIN — ENOXAPARIN SODIUM 40 MILLIGRAM(S): 100 INJECTION SUBCUTANEOUS at 12:25

## 2019-09-13 RX ADMIN — LEVETIRACETAM 500 MILLIGRAM(S): 250 TABLET, FILM COATED ORAL at 17:08

## 2019-09-13 RX ADMIN — ONDANSETRON 4 MILLIGRAM(S): 8 TABLET, FILM COATED ORAL at 12:24

## 2019-09-13 RX ADMIN — Medication 4 MILLIGRAM(S): at 12:24

## 2019-09-13 RX ADMIN — Medication 4 MILLIGRAM(S): at 17:08

## 2019-09-13 RX ADMIN — LEVETIRACETAM 500 MILLIGRAM(S): 250 TABLET, FILM COATED ORAL at 06:23

## 2019-09-13 RX ADMIN — PANTOPRAZOLE SODIUM 40 MILLIGRAM(S): 20 TABLET, DELAYED RELEASE ORAL at 06:23

## 2019-09-13 NOTE — CONSULT NOTE ADULT - PROBLEM SELECTOR RECOMMENDATION 3
Continue Dexamethasone q6h as ordered by primary team. Pending RT beginning Monday 9/16. Appreciate neurosurgery involvement.

## 2019-09-13 NOTE — PROGRESS NOTE ADULT - SUBJECTIVE AND OBJECTIVE BOX
[facility] ([facility])   Internal Medicine  PGY-1  Pager: [phone]; [phone]    Patient is a 68y old  Male who presents with a chief complaint of blurry vision, HA, vertigo (13 Sep 2019 12:46)      SUBJECTIVE / OVERNIGHT EVENTS: No acute event ON. Reports having vertigo when moving his head or eyes. Reports constant, nonradiating HA in the crown region of the head associated with chest pressure and SOB when HA worsens. Last BM was two days ago. Reports diffuse weakness and pain in both legs. Feels nauseous and vomiting with the vertigo. Currently, denies CP/SOB/d/fever/chills.    MEDICATIONS  (STANDING):  dexamethasone     Tablet 4 milliGRAM(s) Oral every 6 hours  enoxaparin Injectable 40 milliGRAM(s) SubCutaneous daily  levETIRAcetam 500 milliGRAM(s) Oral two times a day  LORazepam   Injectable 0.25 milliGRAM(s) IV Push every 6 hours  LORazepam   Injectable 0.25 milliGRAM(s) IV Push once  pantoprazole    Tablet 40 milliGRAM(s) Oral before breakfast  QUEtiapine 25 milliGRAM(s) Oral at bedtime    MEDICATIONS  (PRN):  acetaminophen   Tablet .. 650 milliGRAM(s) Oral every 6 hours PRN Mild Pain (1 - 3)  HYDROmorphone  Injectable 0.5 milliGRAM(s) IV Push every 4 hours PRN Severe Pain (7 - 10)  meclizine 25 milliGRAM(s) Oral two times a day PRN Dizziness  melatonin 3 milliGRAM(s) Oral at bedtime PRN Insomnia  ondansetron    Tablet 4 milliGRAM(s) Oral every 6 hours PRN Nausea and/or Vomiting  polyethylene glycol 3350 17 Gram(s) Oral two times a day PRN Constipation        OBJECTIVE:    Vital Signs Last 24 Hrs  T(C): 36.6 (13 Sep 2019 06:19), Max: 36.7 (12 Sep 2019 17:22)  T(F): 97.9 (13 Sep 2019 06:19), Max: 98 (12 Sep 2019 17:22)  HR: 64 (13 Sep 2019 06:19) (62 - 76)  BP: 114/76 (13 Sep 2019 06:19) (110/64 - 127/83)  BP(mean): --  RR: 16 (13 Sep 2019 06:19) (14 - 24)  SpO2: 100% (13 Sep 2019 06:19) (99% - 100%)    PHYSICAL EXAM:  GENERAL: NAD, breathing comfortably on NC  HEAD:  Atraumatic, Normocephalic  EYES: EOMI, PERRLA, conjunctiva and sclera clear  NECK: Supple, No JVD. Anterior cervical LAD on the L  CHEST/LUNG: Clear to auscultation bilaterally; No wheeze.   HEART: Regular rate and rhythm; No murmurs, rubs, or gallops  ABDOMEN: Soft, Nontender, Nondistended; Bowel sounds present  EXTREMITIES:  2+ Peripheral Pulses, No clubbing, cyanosis, or edema. Tender to palpation of bilateral distal legs. No swelling or erythema.  PSYCH: AAOx3  NEUROLOGY: CN II-XII intact, strength 5/5 BUE and LUE, sensation intact throughout  SKIN: No rashes or lesions      I&O's Summary    12 Sep 2019 07:01  -  13 Sep 2019 07:00  --------------------------------------------------------  IN: 0 mL / OUT: 200 mL / NET: -200 mL        LABS:                        13.9   14.45 )-----------( 258      ( 13 Sep 2019 08:10 )             40.2     09-13    127<L>  |  90<L>  |  22  ----------------------------<  165<H>  4.2   |  23  |  0.68    Ca    9.4      13 Sep 2019 08:10  Phos  4.0     09-13  Mg     2.3     09-13    TPro  6.8  /  Alb  3.9  /  TBili  0.6  /  DBili  x   /  AST  11  /  ALT  26  /  AlkPhos  86  09-13    PT/INR - ( 12 Sep 2019 18:18 )   PT: 11.4 SEC;   INR: 1.03          PTT - ( 12 Sep 2019 18:18 )  PTT:25.0 SEC        RADIOLOGY & ADDITIONAL TESTS:  Unofficial POC u/s showed no pericardial effusion or pleural effusion [facility] ([facility]ander)   Internal Medicine  PGY-1  Pager: [phone]; [phone]    Patient is a 68y old  Male who presents with a chief complaint of blurry vision, HA, vertigo (13 Sep 2019 12:46)      SUBJECTIVE / OVERNIGHT EVENTS: No acute event ON. Reports having vertigo when moving his head or eyes. Reports constant, nonradiating HA in the crown region of the head associated with chest pressure and SOB when HA worsens. Last BM was two days ago. Reports diffuse weakness and pain in both legs. Feels nauseous and vomiting with the vertigo. Currently, denies CP/SOB/d/fever/chills.    MEDICATIONS  (STANDING):  dexamethasone     Tablet 4 milliGRAM(s) Oral every 6 hours  enoxaparin Injectable 40 milliGRAM(s) SubCutaneous daily  levETIRAcetam 500 milliGRAM(s) Oral two times a day  LORazepam   Injectable 0.25 milliGRAM(s) IV Push every 6 hours  LORazepam   Injectable 0.25 milliGRAM(s) IV Push once  pantoprazole    Tablet 40 milliGRAM(s) Oral before breakfast  QUEtiapine 25 milliGRAM(s) Oral at bedtime    MEDICATIONS  (PRN):  acetaminophen   Tablet .. 650 milliGRAM(s) Oral every 6 hours PRN Mild Pain (1 - 3)  HYDROmorphone  Injectable 0.5 milliGRAM(s) IV Push every 4 hours PRN Severe Pain (7 - 10)  meclizine 25 milliGRAM(s) Oral two times a day PRN Dizziness  melatonin 3 milliGRAM(s) Oral at bedtime PRN Insomnia  ondansetron    Tablet 4 milliGRAM(s) Oral every 6 hours PRN Nausea and/or Vomiting  polyethylene glycol 3350 17 Gram(s) Oral two times a day PRN Constipation        OBJECTIVE:    Vital Signs Last 24 Hrs  T(C): 36.6 (13 Sep 2019 06:19), Max: 36.7 (12 Sep 2019 17:22)  T(F): 97.9 (13 Sep 2019 06:19), Max: 98 (12 Sep 2019 17:22)  HR: 64 (13 Sep 2019 06:19) (62 - 76)  BP: 114/76 (13 Sep 2019 06:19) (110/64 - 127/83)  BP(mean): --  RR: 16 (13 Sep 2019 06:19) (14 - 24)  SpO2: 100% (13 Sep 2019 06:19) (99% - 100%)    PHYSICAL EXAM:  GENERAL: NAD, breathing comfortably on NC  HEAD:  Atraumatic, Normocephalic  EYES: EOMI, PERRLA, conjunctiva and sclera clear  NECK: Supple, No JVD. Anterior cervical LAD on the L  CHEST/LUNG: Decreased breath sound b/l otherwise clear to auscultation bilaterally; No wheeze.   HEART: Regular rate and rhythm; No murmurs, rubs, or gallops  ABDOMEN: Soft, Nontender, Nondistended; Bowel sounds present  EXTREMITIES:  2+ Peripheral Pulses, No clubbing, cyanosis, or edema. Tender to palpation of bilateral distal legs. No swelling or erythema.  PSYCH: AAOx3  NEUROLOGY: CN II-XII intact, strength 5/5 BUE and LUE, sensation intact throughout  SKIN: No rashes or lesions      I&O's Summary    12 Sep 2019 07:01  -  13 Sep 2019 07:00  --------------------------------------------------------  IN: 0 mL / OUT: 200 mL / NET: -200 mL        LABS:                        13.9   14.45 )-----------( 258      ( 13 Sep 2019 08:10 )             40.2     09-13    127<L>  |  90<L>  |  22  ----------------------------<  165<H>  4.2   |  23  |  0.68    Ca    9.4      13 Sep 2019 08:10  Phos  4.0     09-13  Mg     2.3     09-13    TPro  6.8  /  Alb  3.9  /  TBili  0.6  /  DBili  x   /  AST  11  /  ALT  26  /  AlkPhos  86  09-13    PT/INR - ( 12 Sep 2019 18:18 )   PT: 11.4 SEC;   INR: 1.03          PTT - ( 12 Sep 2019 18:18 )  PTT:25.0 SEC        RADIOLOGY & ADDITIONAL TESTS:  Unofficial POC u/s showed no pericardial effusion or pleural effusion

## 2019-09-13 NOTE — CONSULT NOTE ADULT - SUBJECTIVE AND OBJECTIVE BOX
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 Pt was hospitalized at [facility] 8/28-9/5 for further work up of brain lesions initially found on imaging in [facility]. Pt had previously been experiencing ataxia for a year and worsening vertigo starting July 2019 which prompted him to go for brain imaging in [facility]. During his admission at [facility], the pt was started on Keppra, decadron, meclizine, zofran with initial improvement in his symptoms but pt eventually required uptitration of decadron. Pt was deemed not a neurosurgical candidate due to extensive number of brain lesions. CT chest demonstrated b/l solid pulmonary nodules in addition to GGOs. Pt underwent RUL EMNB-guided biopsy and FNA performed 8/29/19, positive for malignant cells.     However, since 8/9, pt has had worsening generalized HA, numerous episodes of n/v, weakness, vertigo, and blurry vision. Pt also states he has not been able to walk on account increasing LE weakness. On 8/12, pt saw Dr. Poole (radiation oncology) who recommended pt go to ED due to concern for hydrocephalus. He currently denies CP, SOB, f/c, cough.    In ED: VS Temp 98, HR 62, /83, RR 24 100% SpO2 on RA. WBC 17.6, Na 126, Cl 87, AG 17, CT H showing multiple intracranial metastases with no interval change. Pt s/p decadron 10 mg IVPB (12 Sep 2019 23:36)    PERTINENT PM/SXH:   Chronic GERD  Brain metastases  Metastatic lung cancer (metastasis from lung to other site)  No pertinent past medical history    History of vocal cord polypectomy  No significant past surgical history    FAMILY HISTORY:  Family history of brain cancer: brother and sister      SOCIAL HISTORY:   Significant other/partner: Yes [ ]  No [ ] Children:  Yes [ ]  No [ ] Orthodox/Spirituality:  Substance hx: Yes[ ]  No [ ]   Tobacco hx:  Yes [ ] No [ ]   Alcohol hx: Yes [ ] No [ ]   Home Opioid hx:  [ ] I-Stop Reference No:  Living Situation: [ ]Home  [ ]Long term care  [ ]Rehab [ ]Other    ADVANCE DIRECTIVES:    DNR  MOLST  [ ]  Living Will  [ ]   DECISION MAKER(s):  [ ] Health Care Proxy(s)  [ ] Surrogate(s)  [ ] Guardian           Name(s): Phone Number(s):    BASELINE (I)ADL(s) (prior to admission):  [place]: [ ]Total  [ ] Moderate [ ]Dependent    Allergies    No Known Allergies    Intolerances    MEDICATIONS  (STANDING):  dexamethasone     Tablet 4 milliGRAM(s) Oral every 6 hours  enoxaparin Injectable 40 milliGRAM(s) SubCutaneous daily  levETIRAcetam 500 milliGRAM(s) Oral two times a day  pantoprazole    Tablet 40 milliGRAM(s) Oral before breakfast  QUEtiapine 25 milliGRAM(s) Oral at bedtime    MEDICATIONS  (PRN):  acetaminophen   Tablet .. 650 milliGRAM(s) Oral every 6 hours PRN Mild Pain (1 - 3)  meclizine 25 milliGRAM(s) Oral two times a day PRN Dizziness  melatonin 3 milliGRAM(s) Oral at bedtime PRN Insomnia  ondansetron    Tablet 4 milliGRAM(s) Oral every 6 hours PRN Nausea and/or Vomiting  polyethylene glycol 3350 17 Gram(s) Oral two times a day PRN Constipation    PRESENT SYMPTOMS: [ ]Unable to obtain due to poor mentation   Source if other than patient:  [ ]Family   [ ]Team     Pain (Impact on QOL):    Location -   Severity -        Minimal acceptable level (0-10 scale):  Quality:   Onset:   Duration:                 Aggravating factors -  Relieving factors -  Radiation -    PAIN AD Score:     [email or web address] (press ctrl +  left click to view)    Dyspnea:  Yes [ ] No [ ] - [ ]Mild [ ]Moderate [ ]Severe  Anxiety:    Yes [ ] No [ ] - [ ]Mild [ ]Moderate [ ]Severe  Fatigue:    Yes [ ] No [ ] - [ ]Mild [ ]Moderate [ ]Severe  Nausea:    Yes [ ] No [ ] - [ ]Mild [ ]Moderate [ ]Severe                         Loss of appetite: Yes [ ] No [ ] - [ ]Mild [ ]Moderate [ ]Severe             Constipation:  Yes [ ] No [ ] - [ ]Mild [ ]Moderate [ ]Severe  Grief:  Yes [ ] No [ ]     Other Symptoms:  [ ]All other review of systems negative     Karnofsky Performance Score/Palliative Performance Status Version 2:         %    http://palliative.info/resource_material/[email or web address].pdf  PHYSICAL EXAM:  Vital Signs Last 24 Hrs  T(C): 36.6 (13 Sep 2019 06:19), Max: 36.7 (12 Sep 2019 17:22)  T(F): 97.9 (13 Sep 2019 06:19), Max: 98 (12 Sep 2019 17:22)  HR: 64 (13 Sep 2019 06:19) (62 - 76)  BP: 114/76 (13 Sep 2019 06:19) (110/64 - 127/83)  BP(mean): --  RR: 16 (13 Sep 2019 06:19) (14 - 24)  SpO2: 100% (13 Sep 2019 06:19) (99% - 100%) I&O's Summary    12 Sep 2019 07:01  -  13 Sep 2019 07:00  --------------------------------------------------------  IN: 0 mL / OUT: 200 mL / NET: -200 mL    GENERAL:  [ ]Alert  [ ]Oriented x   [ ]Lethargic  [ ]Cachexia  [ ]Unarousable  [ ]Verbal  [ ]Non-Verbal  Behavioral:   [ ] Anxiety  [ ] Delirium [ ] Agitation [ ] Other  HEENT:  [ ]Normal   [ ]Dry mouth   [ ]ET Tube/Trach  [ ]Oral lesions  PULMONARY:   [ ]Clear  [ ]Tachypnea  [ ]Audible excessive secretions   [ ]Rhonchi        [ ]Right [ ]Left [ ]Bilateral  [ ]Crackles        [ ]Right [ ]Left [ ]Bilateral  [ ]Wheezing     [ ]Right [ ]Left [ ]Bilateral  CARDIOVASCULAR:    [ ]Regular [ ]Irregular [ ]Tachy  [ ]Bryson [ ]Murmur [ ]Other  GASTROINTESTINAL:  [ ]Soft  [ ]Distended   [ ]+BS  [ ]Non tender [ ]Tender  [ ]PEG [ ]OGT/ NGT  Last BM:     GENITOURINARY:  [ ]Normal [ ] Incontinent   [ ]Oliguria/Anuria   [ ]Jimenez  MUSCULOSKELETAL:   [ ]Normal   [ ]Weakness  [ ]Bed/Wheelchair bound [ ]Edema  NEUROLOGIC:   [ ]No focal deficits  [ ] Cognitive impairment  [ ] Dysphagia [ ]Dysarthria [ ] Paresis [ ]Other   SKIN:   [ ]Normal   [ ]Pressure ulcer(s)  [ ]Rash    LABS:                        13.9   14.45 )-----------( 258      ( 13 Sep 2019 08:10 )             40.2   09-13    127<L>  |  90<L>  |  22  ----------------------------<  165<H>  4.2   |  23  |  0.68    Ca    9.4      13 Sep 2019 08:10  Phos  4.0     09-13  Mg     2.3     09-13    TPro  6.8  /  Alb  3.9  /  TBili  0.6  /  DBili  x   /  AST  11  /  ALT  26  /  AlkPhos  86  09-13  PT/INR - ( 12 Sep 2019 18:18 )   PT: 11.4 SEC;   INR: 1.03          PTT - ( 12 Sep 2019 18:18 )  PTT:25.0 SEC      RADIOLOGY & ADDITIONAL STUDIES:    PROTEIN CALORIE MALNUTRITION PRESENT: [ ] Yes [ ] No  [ ] PPSV2 < or = to 30% [ ] significant weight loss  [ ] poor nutritional intake [ ] catabolic state [ ] anasarca     Albumin, Serum: 3.9 g/dL (09-13-19 @ 08:10)      REFERRALS:   [ ]Chaplaincy  [ ] Hospice  [ ]Child Life  [ ]Social Work  [ ]Case management [ ]Holistic Therapy   Goals of Care Discussion Document: HPI:  Pt is a 67 yo w/ PMHx recently diagnosed NSCLC with mets to brain, presenting with worsening generalized HA, blurry vision, and vertigo since 9/9. Pt was hospitalized at [facility] 8/28-9/5 for further work up of brain lesions initially found on imaging in [facility]. Pt had previously been experiencing ataxia for a year and worsening vertigo starting July 2019 which prompted him to go for brain imaging in [facility]. During his admission at [facility], the pt was started on Keppra, decadron, meclizine, zofran with initial improvement in his symptoms but pt eventually required uptitration of decadron. Pt was deemed not a neurosurgical candidate due to extensive number of brain lesions. CT chest demonstrated b/l solid pulmonary nodules in addition to GGOs. Pt underwent RUL EMNB-guided biopsy and FNA performed 8/29/19, positive for malignant cells.     However, since 8/9, pt has had worsening generalized HA, numerous episodes of n/v, weakness, vertigo, and blurry vision. Pt also states he has not been able to walk on account increasing LE weakness. On 8/12, pt saw Dr. Poole (radiation oncology) who recommended pt go to ED due to concern for hydrocephalus. He currently denies CP, SOB, f/c, cough.    In ED: VS Temp 98, HR 62, /83, RR 24 100% SpO2 on RA. WBC 17.6, Na 126, Cl 87, AG 17, CT H showing multiple intracranial metastases with no interval change. Pt s/p decadron 10 mg IVPB (12 Sep 2019 23:36)    Patient examined, resting in bed complaining of nausea and vertigo. Daughter at bedside.      PERTINENT PM/SXH:   Chronic GERD  Brain metastases  Metastatic lung cancer (metastasis from lung to other site)  No pertinent past medical history    History of vocal cord polypectomy  No significant past surgical history    FAMILY HISTORY:  Family history of brain cancer: brother and sister      SOCIAL HISTORY:   Significant other/partner:  [x]  Children:  [x]  Orthodox/Spirituality: Baptist  Substance hx:  [ ]   Tobacco hx:  [x]   Alcohol hx: [ ]   Home Opioid hx:  [ ] I-Stop Reference No:  Living Situation: [x]Home  [ ]Long term care  [ ]Rehab [ ]Other    ADVANCE DIRECTIVES:  No  Full Code  DECISION MAKER(s):  [ x] Health Care Proxy(s)  [ ] Surrogate(s)  [ ] Guardian           Name(s): Phone Number(s):  Mary Zavaleta (daughter): [phone]      BASELINE (I)ADL(s) (prior to admission):  [place]: [ ]Total  [x ] Moderate [ ]Dependent    Allergies    No Known Allergies    Intolerances    MEDICATIONS  (STANDING):  dexamethasone     Tablet 4 milliGRAM(s) Oral every 6 hours  enoxaparin Injectable 40 milliGRAM(s) SubCutaneous daily  levETIRAcetam 500 milliGRAM(s) Oral two times a day  pantoprazole    Tablet 40 milliGRAM(s) Oral before breakfast  QUEtiapine 25 milliGRAM(s) Oral at bedtime    MEDICATIONS  (PRN):  acetaminophen   Tablet .. 650 milliGRAM(s) Oral every 6 hours PRN Mild Pain (1 - 3)  meclizine 25 milliGRAM(s) Oral two times a day PRN Dizziness  melatonin 3 milliGRAM(s) Oral at bedtime PRN Insomnia  ondansetron    Tablet 4 milliGRAM(s) Oral every 6 hours PRN Nausea and/or Vomiting  polyethylene glycol 3350 17 Gram(s) Oral two times a day PRN Constipation    PRESENT SYMPTOMS: [ ]Unable to obtain due to poor mentation   Source if other than patient:  [ ]Family   [ ]Team     Pain (Impact on QOL):  no pain    PAIN AD Score:     [email or web address].[email or web address]/painad.pdf (press ctrl +  left click to view)    Dyspnea:  Yes [x ] No [ ] - [x ]Mild [ ]Moderate [ ]Severe  Anxiety:    Yes [ ] No [x ] - [ ]Mild [ ]Moderate [ ]Severe  Fatigue:    Yes [x ] No [ ] - [ ]Mild [x ]Moderate [ ]Severe  Nausea:    Yes [x ] No [ ] - [ ]Mild [ ]Moderate [x ]Severe                         Loss of appetite: Yes [x ] No [ ] - [ ]Mild [x ]Moderate [ ]Severe             Constipation:  Yes [ ] No [x ] - [ ]Mild [ ]Moderate [ ]Severe  Grief:  Yes [ ] No [x ]     Other Symptoms:  [x ]All other review of systems negative     Karnofsky Performance Score/Palliative Performance Status Version 2:      60%    http://palliative.info/resource_material/PPSv2.pdf  PHYSICAL EXAM:  Vital Signs Last 24 Hrs  T(C): 36.6 (13 Sep 2019 06:19), Max: 36.7 (12 Sep 2019 17:22)  T(F): 97.9 (13 Sep 2019 06:19), Max: 98 (12 Sep 2019 17:22)  HR: 64 (13 Sep 2019 06:19) (62 - 76)  BP: 114/76 (13 Sep 2019 06:19) (110/64 - 127/83)  BP(mean): --  RR: 16 (13 Sep 2019 06:19) (14 - 24)  SpO2: 100% (13 Sep 2019 06:19) (99% - 100%) I&O's Summary    12 Sep 2019 07:01  -  13 Sep 2019 07:00  --------------------------------------------------------  IN: 0 mL / OUT: 200 mL / NET: -200 mL    GENERAL:  [x ]Alert  [x ]Oriented x 3   [ ]Lethargic  [ ]Cachexia  [ ]Unarousable  [ ]Verbal  [ ]Non-Verbal  Behavioral:   [ ] Anxiety  [ ] Delirium [ ] Agitation [ ] Other  HEENT:  [x ]Normal   [ ]Dry mouth   [ ]ET Tube/Trach  [ ]Oral lesions  PULMONARY:   [x ]Clear  [ ]Tachypnea  [ ]Audible excessive secretions   [ ]Rhonchi        [ ]Right [ ]Left [ ]Bilateral  [ ]Crackles        [ ]Right [ ]Left [ ]Bilateral  [ ]Wheezing     [ ]Right [ ]Left [ ]Bilateral  CARDIOVASCULAR:    [x ]Regular [ ]Irregular [ ]Tachy  [ ]Bryson [ ]Murmur [ ]Other  GASTROINTESTINAL:  [x ]Soft  [ ]Distended   [x ]+BS  [x ]Non tender [ ]Tender  [ ]PEG [ ]OGT/ NGT  Last BM: 9/11/2019    GENITOURINARY:  [x ]Normal [ ] Incontinent   [ ]Oliguria/Anuria   [ ]Jimenez  MUSCULOSKELETAL:   [ ]Normal   [x ]Weakness  [ ]Bed/Wheelchair bound [ ]Edema  NEUROLOGIC:   [x ]No focal deficits  [ ] Cognitive impairment  [ ] Dysphagia [ ]Dysarthria [ ] Paresis [ ]Other   SKIN:   [x ]Normal   [ ]Pressure ulcer(s)  [ ]Rash    LABS:                        13.9   14.45 )-----------( 258      ( 13 Sep 2019 08:10 )             40.2   09-13    127<L>  |  90<L>  |  22  ----------------------------<  165<H>  4.2   |  23  |  0.68    Ca    9.4      13 Sep 2019 08:10  Phos  4.0     09-13  Mg     2.3     09-13    TPro  6.8  /  Alb  3.9  /  TBili  0.6  /  DBili  x   /  AST  11  /  ALT  26  /  AlkPhos  86  09-13  PT/INR - ( 12 Sep 2019 18:18 )   PT: 11.4 SEC;   INR: 1.03          PTT - ( 12 Sep 2019 18:18 )  PTT:25.0 SEC      RADIOLOGY & ADDITIONAL STUDIES:    < from: CT Head w/wo IV Cont (09.12.19 @ 20:03) >  EXAM:  CT BRAIN WAW IC        PROCEDURE DATE:  Sep 12 2019 IMPRESSION:     Multiple intracranial metastases which have not changed significantly   from the prior MR scan.. No acute intracranial hemorrhage, hydrocephalus,   or evidence of herniation.      < end of copied text >      PROTEIN CALORIE MALNUTRITION PRESENT: [ ] Yes [ ] No  [ ] PPSV2 < or = to 30% [ ] significant weight loss  [ ] poor nutritional intake [ ] catabolic state [ ] anasarca     Albumin, Serum: 3.9 g/dL (09-13-19 @ 08:10)      REFERRALS:   [ ]Chaplaincy  [ ] Hospice  [ ]Child Life  [ ]Social Work  [ ]Case management [ ]Holistic Therapy   Goals of Care Discussion Document: HPI:  Pt is a 67 yo w/ PMHx recently diagnosed NSCLC with mets to brain, presenting with worsening generalized HA, blurry vision, and vertigo since 9/9. Pt was hospitalized at [facility] 8/28-9/5 for further work up of brain lesions initially found on imaging in [facility]. Pt had previously been experiencing ataxia for a year and worsening vertigo starting July 2019 which prompted him to go for brain imaging in [facility]. During his admission at [facility], the pt was started on Keppra, decadron, meclizine, zofran with initial improvement in his symptoms but pt eventually required uptitration of decadron. Pt was deemed not a neurosurgical candidate due to extensive number of brain lesions. CT chest demonstrated b/l solid pulmonary nodules in addition to GGOs. Pt underwent RUL EMNB-guided biopsy and FNA performed 8/29/19, positive for malignant cells.     However, since 8/9, pt has had worsening generalized HA, numerous episodes of n/v, weakness, vertigo, and blurry vision. Pt also states he has not been able to walk on account increasing LE weakness. On 8/12, pt saw Dr. Poole (radiation oncology) who recommended pt go to ED due to concern for hydrocephalus. He currently denies CP, SOB, f/c, cough.    In ED: VS Temp 98, HR 62, /83, RR 24 100% SpO2 on RA. WBC 17.6, Na 126, Cl 87, AG 17, CT H showing multiple intracranial metastases with no interval change. Pt s/p decadron 10 mg IVPB (12 Sep 2019 23:36)    Patient examined, resting in bed complaining of nausea and vertigo. Daughter at bedside.      PERTINENT PM/SXH:   Chronic GERD  Brain metastases  Metastatic lung cancer (metastasis from lung to other site)  No pertinent past medical history    History of vocal cord polypectomy  No significant past surgical history    FAMILY HISTORY:  Family history of brain cancer: brother and sister    SOCIAL HISTORY:   Significant other/partner:  [x]  Children:  [x]  Judaism/Spirituality: Hoahaoism  Substance hx:  [ ]   Tobacco hx:  [x]   Alcohol hx: [ ]   Home Opioid hx:  [ ] I-Stop Reference No:  Living Situation: [x]Home  [ ]Long term care  [ ]Rehab [ ]Other    ADVANCE DIRECTIVES:  No  Full Code  DECISION MAKER(s):  [ x] Health Care Proxy(s)  [ ] Surrogate(s)  [ ] Guardian           Name(s): Phone Number(s):  Mary Zavaleta (daughter): [phone]      BASELINE (I)ADL(s) (prior to admission):  [place]: [ ]Total  [x ] Moderate [ ]Dependent    Allergies    No Known Allergies    Intolerances    MEDICATIONS  (STANDING):  dexamethasone     Tablet 4 milliGRAM(s) Oral every 6 hours  enoxaparin Injectable 40 milliGRAM(s) SubCutaneous daily  levETIRAcetam 500 milliGRAM(s) Oral two times a day  pantoprazole    Tablet 40 milliGRAM(s) Oral before breakfast  QUEtiapine 25 milliGRAM(s) Oral at bedtime    MEDICATIONS  (PRN):  acetaminophen   Tablet .. 650 milliGRAM(s) Oral every 6 hours PRN Mild Pain (1 - 3)  meclizine 25 milliGRAM(s) Oral two times a day PRN Dizziness  melatonin 3 milliGRAM(s) Oral at bedtime PRN Insomnia  ondansetron    Tablet 4 milliGRAM(s) Oral every 6 hours PRN Nausea and/or Vomiting  polyethylene glycol 3350 17 Gram(s) Oral two times a day PRN Constipation    PRESENT SYMPTOMS: [ ]Unable to obtain due to poor mentation   Source if other than patient:  [ ]Family   [ ]Team     Pain (Impact on QOL):  no pain    PAIN AD Score:     [email or web address]/[email or web address].pdf (press ctrl +  left click to view)    Dyspnea:  Yes [x ] No [ ] - [x ]Mild [ ]Moderate [ ]Severe  Anxiety:    Yes [ ] No [x ] - [ ]Mild [ ]Moderate [ ]Severe  Fatigue:    Yes [x ] No [ ] - [ ]Mild [x ]Moderate [ ]Severe  Nausea:    Yes [x ] No [ ] - [ ]Mild [ ]Moderate [x ]Severe                         Loss of appetite: Yes [x ] No [ ] - [ ]Mild [x ]Moderate [ ]Severe             Constipation:  Yes [ ] No [x ] - [ ]Mild [ ]Moderate [ ]Severe  Grief:  Yes [ ] No [x ]     Other Symptoms:  [x ]All other review of systems negative     Karnofsky Performance Score/Palliative Performance Status Version 2:      60%    http://palliative.info/resource_material/PPSv2.pdf  PHYSICAL EXAM:  Vital Signs Last 24 Hrs  T(C): 36.6 (13 Sep 2019 06:19), Max: 36.7 (12 Sep 2019 17:22)  T(F): 97.9 (13 Sep 2019 06:19), Max: 98 (12 Sep 2019 17:22)  HR: 64 (13 Sep 2019 06:19) (62 - 76)  BP: 114/76 (13 Sep 2019 06:19) (110/64 - 127/83)  BP(mean): --  RR: 16 (13 Sep 2019 06:19) (14 - 24)  SpO2: 100% (13 Sep 2019 06:19) (99% - 100%) I&O's Summary    12 Sep 2019 07:01  -  13 Sep 2019 07:00  --------------------------------------------------------  IN: 0 mL / OUT: 200 mL / NET: -200 mL    GENERAL:  [x ]Alert  [x ]Oriented x 3   [ ]Lethargic  [ ]Cachexia  [ ]Unarousable  [x ]Verbal  [ ]Non-Verbal  Behavioral:   [ ] Anxiety  [ ] Delirium [ ] Agitation [ ] Other  HEENT:  [x ]Normal   [ ]Dry mouth   [ ]ET Tube/Trach  [ ]Oral lesions  PULMONARY:   [x ]Clear  [ ]Tachypnea  [ ]Audible excessive secretions   [ ]Rhonchi        [ ]Right [ ]Left [ ]Bilateral  [ ]Crackles        [ ]Right [ ]Left [ ]Bilateral  [ ]Wheezing     [ ]Right [ ]Left [ ]Bilateral  CARDIOVASCULAR:    [x ]Regular [ ]Irregular [ ]Tachy  [ ]Bryson [ ]Murmur [ ]Other  GASTROINTESTINAL:  [x ]Soft  [ ]Distended   [x ]+BS  [x ]Non tender [ ]Tender  [ ]PEG [ ]OGT/ NGT  Last BM: 9/11/2019    GENITOURINARY:  [x ]Normal [ ] Incontinent   [ ]Oliguria/Anuria   [ ]Jimenez  MUSCULOSKELETAL:   [ ]Normal   [x ]Weakness  [ ]Bed/Wheelchair bound [ ]Edema  NEUROLOGIC:   [x ]No focal deficits  [ ] Cognitive impairment  [ ] Dysphagia [ ]Dysarthria [ ] Paresis [ ]Other   SKIN:   [x ]Normal   [ ]Pressure ulcer(s)  [ ]Rash    LABS:                        13.9   14.45 )-----------( 258      ( 13 Sep 2019 08:10 )             40.2   09-13    127<L>  |  90<L>  |  22  ----------------------------<  165<H>  4.2   |  23  |  0.68    Ca    9.4      13 Sep 2019 08:10  Phos  4.0     09-13  Mg     2.3     09-13    TPro  6.8  /  Alb  3.9  /  TBili  0.6  /  DBili  x   /  AST  11  /  ALT  26  /  AlkPhos  86  09-13  PT/INR - ( 12 Sep 2019 18:18 )   PT: 11.4 SEC;   INR: 1.03       PTT - ( 12 Sep 2019 18:18 )  PTT:25.0 SEC    RADIOLOGY & ADDITIONAL STUDIES: reviewed    < from: CT Head w/wo IV Cont (09.12.19 @ 20:03) >  EXAM:  CT BRAIN WAW IC        PROCEDURE DATE:  Sep 12 2019 IMPRESSION:     Multiple intracranial metastases which have not changed significantly   from the prior MR scan.. No acute intracranial hemorrhage, hydrocephalus,   or evidence of herniation.    PROTEIN CALORIE MALNUTRITION PRESENT: [ ] Yes [ ] No  [ ] PPSV2 < or = to 30% [ ] significant weight loss  [ ] poor nutritional intake [ ] catabolic state [ ] anasarca     Albumin, Serum: 3.9 g/dL (09-13-19 @ 08:10)    REFERRALS:   [ ]Chaplaincy  [ ] Hospice  [ ]Child Life  [ ]Social Work  [ ]Case management [ ]Holistic Therapy   Goals of Care Discussion Document:

## 2019-09-13 NOTE — PATIENT PROFILE ADULT - VISION (WITH CORRECTIVE LENSES IF THE PATIENT USUALLY WEARS THEM):
Normal vision: sees adequately in most situations; can see medication labels, newsprint/with eyeglasses Partially impaired: cannot see medication labels or newsprint, but can see obstacles in path, and the surrounding layout; can count fingers at arm's length/with eyeglasses

## 2019-09-13 NOTE — CONSULT NOTE ADULT - PROBLEM SELECTOR RECOMMENDATION 9
Patient with complaints of severe nausea with inability to tolerate food or movement. PRN Zofran administered with no relief. Will initiate Ativan 0.25mg IV q6hr. Patient with complaints of severe nausea with inability to tolerate food or movement. PRN Zofran administered with no relief. Will initiate Ativan 0.25mg IV q6hr atc.

## 2019-09-13 NOTE — PROGRESS NOTE ADULT - ASSESSMENT
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 CT H redemonstrates numerous metastases without significant interval change with no evidence of hydrocephalus. Patient is planned for inpatient RT on Monday 9/16.

## 2019-09-13 NOTE — HISTORY OF PRESENT ILLNESS
[FreeTextEntry1] : 68 year old former smoker who was in his usual state of health until July 2019 when he began to experience dizziness. In late July 2019, his symptoms worsened. In mid-August 2019, the dizziness was associated with vomiting. He was taken to the hospital in [facility] where he was found to have multiple brain lesions consistent with metastatic disease. He made his way to [place] and is now status post admission at [facility] from 8/28/19-9/6/19. 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 Upon symptomatic improvement with steroids, he was discharged home with outpatient medical oncology and radiation oncology appointments.\par \par Pathology: -Lung, RUL BAL 8/29/19: Positive for malignant cells. Non-small cell carcinoma, favor adenocarcinoma.\par -Lung, RUL EMNB-guided biopsy and FNA 8/29/19: Positive for malignant cells. Non-small cell carcinoma favor adenocarcinoma. PD L1 negative at 0%. Molecular testing is pending. \par \par 8/28/19 \par MRI brain: INTRA-AXIAL: Multiple enhancing masses are seen throughout the cerebral and \par cerebellar hemispheres as seen on the prior CT scan. Several lesions \par demonstrate susceptibility artifact with or without precontrast T1 \par hyperintensity and heterogeneous T2 signal compatible with associated \par hemorrhage. Several lesions are associated with vasogenic edema. \par A left occipital mass with significant edema measures 1.4 x 1.3 cm. \par A mass within the vermis measures 2.3 x 1.4 cm. \par A mass within the right cerebellar hemisphere measures 1.8 x 1.6 cm. \par A mass within the left cerebellum involving the cerebellar tonsil measures \par 1.8 x 1.8 cm and an adjacent mass within or projecting into the fourth \par ventricle measures 1.5 x 1 cm effacing the fourth ventricular lumen. This \par nodule significantly indents the adjacent medulla. \par \par Today in clinic, the patient endorses N/V, dramatic headaches, and cannot ambulate.  He has increasing generalized weakness, nausea, vomiting over the past week. Now unable to eat, and vomiting without eating over the last day. \par with increasing visual difficulty. Dizziness is now persistent even with his eyes closed. \par Headaches 8/10 at most times. On dexamethasone 4mg every 6 hours.\par \par \par \par

## 2019-09-13 NOTE — CONSULT NOTE ADULT - PROBLEM SELECTOR RECOMMENDATION 5
Patient seen with daughter at bedside. Patient designated daughter as HCP. Form in chart. Briefly started conversation with patient regarding goals of care, given patient acutely symptomatic not ready to discuss. Will continue to manage symptoms and will follow up for goals of care.

## 2019-09-13 NOTE — ED ADULT NURSE REASSESSMENT NOTE - NS ED NURSE REASSESS COMMENT FT1
Report given to CHAN Chavira, pt going to [facility]. Pt stable and in no apparent distress. respirations are equal and nonlabored. family at bedside.

## 2019-09-13 NOTE — VITALS
[Maximal Pain Intensity: 8/10] : 8/10 [Least Pain Intensity: 0/10] : 0/10 [40: Disabled, requires special care and assistance.] : 40: Disabled, requires special care and assistance. [ECOG Performance Status: 3 - Capable of only limited self care, confined to bed or chair more than 50% of waking hours] : Performance Status: 3 - Capable of only limited self care, confined to bed or chair more than 50% of waking hours

## 2019-09-13 NOTE — PROGRESS NOTE ADULT - PROBLEM SELECTOR PLAN 3
Pt hyponatremic to 126 on admission. 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 Pt responded to fluid restriction at that time  - c/w 1000 cc fluid restriction  - f/u urine Na, urine Cr, U osm, serum osm

## 2019-09-13 NOTE — CONSULT NOTE ADULT - ATTENDING COMMENTS
The patient was seen and examined today with the fellow, Dr. Robison, and I agree with the History, Physical Exam and Plan in the record. Labs and imaging reviewed by me.
Pt seen with NP.  Agree with above plan.

## 2019-09-13 NOTE — CONSULT NOTE ADULT - SUBJECTIVE AND OBJECTIVE BOX
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 Pt was hospitalized at [facility] 8/28-9/5 for further work up of brain lesions initially found on imaging in [facility]. Pt had previously been experiencing ataxia for a year and worsening vertigo starting July 2019 which prompted him to go for brain imaging in [facility]. During his admission at [facility], the pt was started on Keppra, decadron, meclizine, zofran with initial improvement in his symptoms but pt eventually required uptitration of decadron. Pt was deemed not a neurosurgical candidate due to extensive number of brain lesions. CT chest demonstrated b/l solid pulmonary nodules in addition to GGOs. Pt underwent RUL lung biopsy and FNA performed 8/29/19 which confirmed NSCLC. Pt seen by Dr. Boyce at [facility] on 9/10 and Dr. Poole on 9/12. Planned for WBRT and systemic treatment pending results of molecular studies. However, since 9/9, pt has had worsening generalized HA, numerous episodes of n/v, weakness, vertigo, and blurry vision. Pt also states he has not been able to walk on account increasing LE weakness. On 8/12, pt saw Dr. Poole (radiation oncology) who recommended pt go to ED due to concern for hydrocephalus. He currently denies CP, SOB, f/c, cough.    In ED: VS Temp 98, HR 62, /83, RR 24 100% SpO2 on RA. WBC 17.6, Na 126, Cl 87, AG 17, CT H showing multiple intracranial metastases with no interval change. Pt s/p decadron 10 mg IVPB (12 Sep 2019 23:36)    PAST MEDICAL & SURGICAL HISTORY:  Chronic GERD  Brain metastases  Metastatic lung cancer (metastasis from lung to other site)  History of vocal cord polypectomy    Allergies    No Known Allergies    Intolerances    MEDICATIONS  (STANDING):  dexamethasone     Tablet 4 milliGRAM(s) Oral every 6 hours  enoxaparin Injectable 40 milliGRAM(s) SubCutaneous daily  levETIRAcetam 500 milliGRAM(s) Oral two times a day  LORazepam   Injectable 0.25 milliGRAM(s) IV Push every 6 hours  pantoprazole    Tablet 40 milliGRAM(s) Oral before breakfast  QUEtiapine 25 milliGRAM(s) Oral at bedtime    MEDICATIONS  (PRN):  acetaminophen   Tablet .. 650 milliGRAM(s) Oral every 6 hours PRN Mild Pain (1 - 3)  HYDROmorphone  Injectable 0.5 milliGRAM(s) IV Push every 4 hours PRN Severe Pain (7 - 10)  meclizine 25 milliGRAM(s) Oral two times a day PRN Dizziness  melatonin 3 milliGRAM(s) Oral at bedtime PRN Insomnia  ondansetron    Tablet 4 milliGRAM(s) Oral every 6 hours PRN Nausea and/or Vomiting  polyethylene glycol 3350 17 Gram(s) Oral two times a day PRN Constipation    FAMILY HISTORY:  Family history of brain cancer: brother and sister    SOCIAL HISTORY: former 52 pack year smoker    REVIEW OF SYSTEMS: see HPI  All other review of systems is negative unless indicated above    Height (cm): 167.6 (09-13 @ 03:18)  Weight (kg): 53.9 (09-13 @ 03:18)  BMI (kg/m2): 19.2 (09-13 @ 03:18)  BSA (m2): 1.6 (09-13 @ 03:18)    VITALS:   T(F): 97.9 (09-13-19 @ 06:19), Max: 98 (09-12-19 @ 17:22)  HR: 64 (09-13-19 @ 06:19)  BP: 114/76 (09-13-19 @ 06:19)  RR: 16 (09-13-19 @ 06:19)  SpO2: 100% (09-13-19 @ 06:19)  Wt(kg): --    PHYSICAL EXAM:  GENERAL: resting in bed comfortably  NECK: supple  RESP: CTAB  HEART: RRR, S1/S2  ABDOMEN: +BS, soft, NT, ND  EXTREMITIES: no LE edema  NEUROLOGIC: AAO x 3    LABS:                         13.9   14.45 )-----------( 258      ( 13 Sep 2019 08:10 )             40.2       09-13    127<L>  |  90<L>  |  22  ----------------------------<  165<H>  4.2   |  23  |  0.68    Ca    9.4      13 Sep 2019 08:10  Phos  4.0     09-13  Mg     2.3     09-13    TPro  6.8  /  Alb  3.9  /  TBili  0.6  /  DBili  x   /  AST  11  /  ALT  26  /  AlkPhos  86  09-13  Phosphorus Level, Serum: 4.0 mg/dL (09-13 @ 08:10)  Magnesium, Serum: 2.3 mg/dL (09-13 @ 08:10)    PT/INR - ( 12 Sep 2019 18:18 )   PT: 11.4 SEC;   INR: 1.03     PTT - ( 12 Sep 2019 18:18 )  PTT:25.0 SEC    IMAGING:  - CT Head w/wo IV Cont (09.12.19 @ 20:03) >  IMPRESSION: Multiple intracranial metastases which have not changed significantly from the prior MR scan.. No acute intracranial hemorrhage, hydrocephalus, or evidence of herniation.

## 2019-09-13 NOTE — REVIEW OF SYSTEMS
[Dysphagia] : dysphagia [Chest Pain] : chest pain [Shortness Of Breath] : shortness of breath [Confused] : confusion [Dizziness] : dizziness [Negative] : Constitutional [Fever] : no fever [Night Sweats] : no night sweats [Chills] : no chills [FreeTextEntry3] : vision blurry [FreeTextEntry9] : generalized weakness, worse over the past 2 days [FreeTextEntry7] : nausea and vomiting for past 2 days [de-identified] : headaches 8/10 most times

## 2019-09-13 NOTE — CONSULT NOTE ADULT - ASSESSMENT
67 yo M former heavy smoker, recent admit for worsening ataxia found to have multiple brain lesions with vasogenic edema and b/l spiculated lung nodules s/p bronchoscopy with biopsy on 8/29, pathology results consistent with NSCLC adenocarcinoma a/w worsening headache, blurry vision, and vertigo.     1. Metastatic lung adenocarcinoma: with symptomatic brain mets, PDL-1 TPS 0%  - CT chest with b/l spiculated lung nodules s/p bronch/EBUS, CTAP with no mets, MRI brain with multiple intraparenchymal enhancing masses with associated hemorrhage and/or vasogenic edema  - Repeat CT head from 9/12 with no change in intracranial mets, no evidence of hydrocephalus. Appreciate Neurosurgery input.   - on keppra and dexamethasone 4 mg q6h  - Appreciate Rad Onc eval, plan for CT simulation and radiation treatment starting 9/16/19 (30 Gy in 10 fractions) for a total of 10 treatments  - PDL-1 is negative, therefore single agent immunotherapy would not be appropriate in first line setting. Plan per Dr. Boyce was to address intracranial mets first with RT given pt's main symptoms related to brain mets and then palliative chemotherapy.   - Pt's daughter/HCP raised concerns regarding pt's declining functional status and nutrition. States she is leaning towards focusing on symptom management at this time and is requesting to speak with Palliative care team.     d/w primary team    Lauren Robison, PGY-6  Hematology-Oncology Fellow  Pager: [phone] ([facility]), [phone] ([facility])  (After 5 pm or on weekends please page the on-call fellow) 67 yo M former heavy smoker, recent admit for worsening ataxia found to have multiple brain lesions with vasogenic edema and b/l spiculated lung nodules s/p bronchoscopy with biopsy on 8/29, pathology results consistent with NSCLC adenocarcinoma a/w worsening headache, blurry vision, and vertigo.     1. Metastatic lung adenocarcinoma: with symptomatic brain mets, PDL-1 TPS 0%  - CT chest with b/l spiculated lung nodules s/p bronch/EBUS, CTAP with no mets, MRI brain with multiple intraparenchymal enhancing masses with associated hemorrhage and/or vasogenic edema  - Repeat CT head from 9/12 with no change in intracranial mets, no evidence of hydrocephalus. Appreciate Neurosurgery input.   - on keppra and dexamethasone 4 mg q6h  - Appreciate Rad Onc eval, plan for CT simulation and radiation treatment starting 9/16/19 (30 Gy in 10 fractions) for a total of 10 treatments  - PDL-1 is negative, therefore single agent immunotherapy would not be appropriate in first line setting. Plan per Dr. Boyce was to address intracranial mets first with RT given pt's main symptoms related to brain mets and then to consider palliative chemotherapy (other molecular markers for targeted therapies also still pending).   - Pt's daughter/HCP raised concerns regarding pt's declining functional status and nutrition. States she is leaning towards focusing on symptom management at this time and is requesting to speak with Palliative care team.     d/w primary team    Lauren Robison, PGY-6  Hematology-Oncology Fellow  Pager: [phone] ([facility]), [phone] ([facility])  (After 5 pm or on weekends please page the on-call fellow)

## 2019-09-13 NOTE — PROGRESS NOTE ADULT - PROBLEM SELECTOR PLAN 2
Pt diagnosed on recent admission 8/28-9/5. S/p RUL EMNB-guided biopsy and FNA performed 8/29/19, positive for malignant cells. Molecular testing is pending.   - oncology and radiation oncology c/s. f/u recs as above  - consider palliative consulted for continued GOC and sx management. f/u recs as above

## 2019-09-13 NOTE — PROGRESS NOTE ADULT - PROBLEM SELECTOR PLAN 1
Pt found to have brain lesions in July 2019, likely 2/2 NSCLC. Now came to ED with worsening vertigo, n/v. CTH redemonstrated multiple intracranial metastases, largest 9.3 cm in the R frontal region, but also with numerous cerebellar lesions. No hydrocephalus.  - pt evaluated by Neurosurgery in the ED, not candidate for surgery  - oncology, rad onc and palliative care c/s. f/u recs  - rad onc: planned for inpatient RT on Monday 9/16   - c/w decadron 4 mg q6 hr  - c/w Keppra 500 mg bid for seizure prophylaxis  - PPI prophylaxis  - c/w zofran and meclizine for n/v and vertigo management  - dilaudid 0.5mg q4h for HA  - fall precautions  - neuro checks

## 2019-09-13 NOTE — CONSULT NOTE ADULT - PROBLEM SELECTOR RECOMMENDATION 4
Patient follows MD Boyce in the community. Given active symptoms not a candidate for treatment at this time. Pending further pathology. Appreciate Oncology and Radiation Oncology involvement.

## 2019-09-13 NOTE — PATIENT PROFILE ADULT - NSPROMUTINFOINDIVIDFT_GEN_A_NUR
use visitor bathroom in elevator lobby because needs sink beside toilet.(cultural need) respect cultural values

## 2019-09-13 NOTE — CONSULT NOTE ADULT - CONSULT REASON
Complex decision making related to goals of care and symptom management in the setting of metastatic disease

## 2019-09-13 NOTE — CONSULT NOTE ADULT - ASSESSMENT
68 year old male with NSCLC with metastasis to the brain. Presents with vertigo and severe nausea. Palliative care consulted for symptom management.

## 2019-09-14 DIAGNOSIS — B37.0 CANDIDAL STOMATITIS: ICD-10-CM

## 2019-09-14 LAB
ANION GAP SERPL CALC-SCNC: 16 MMO/L — HIGH (ref 7–14)
BUN SERPL-MCNC: 20 MG/DL — SIGNIFICANT CHANGE UP (ref 7–23)
CALCIUM SERPL-MCNC: 9.1 MG/DL — SIGNIFICANT CHANGE UP (ref 8.4–10.5)
CHLORIDE SERPL-SCNC: 95 MMOL/L — LOW (ref 98–107)
CO2 SERPL-SCNC: 21 MMOL/L — LOW (ref 22–31)
CREAT ?TM UR-MCNC: 85.8 MG/DL — SIGNIFICANT CHANGE UP
CREAT SERPL-MCNC: 0.64 MG/DL — SIGNIFICANT CHANGE UP (ref 0.5–1.3)
GLUCOSE SERPL-MCNC: 153 MG/DL — HIGH (ref 70–99)
HCT VFR BLD CALC: 41.7 % — SIGNIFICANT CHANGE UP (ref 39–50)
HGB BLD-MCNC: 14.3 G/DL — SIGNIFICANT CHANGE UP (ref 13–17)
LEVETIRACETAM SERPL-MCNC: 5.3 MCG/ML — LOW (ref 12–46)
MCHC RBC-ENTMCNC: 28.5 PG — SIGNIFICANT CHANGE UP (ref 27–34)
MCHC RBC-ENTMCNC: 34.3 % — SIGNIFICANT CHANGE UP (ref 32–36)
MCV RBC AUTO: 83.2 FL — SIGNIFICANT CHANGE UP (ref 80–100)
NRBC # FLD: 0 K/UL — SIGNIFICANT CHANGE UP (ref 0–0)
OSMOLALITY SERPL: 276 MOSMO/KG — SIGNIFICANT CHANGE UP (ref 275–295)
OSMOLALITY UR: 719 MOSMO/KG — SIGNIFICANT CHANGE UP (ref 50–1200)
PLATELET # BLD AUTO: 258 K/UL — SIGNIFICANT CHANGE UP (ref 150–400)
PMV BLD: 9.4 FL — SIGNIFICANT CHANGE UP (ref 7–13)
POTASSIUM SERPL-MCNC: 3.8 MMOL/L — SIGNIFICANT CHANGE UP (ref 3.5–5.3)
POTASSIUM SERPL-SCNC: 3.8 MMOL/L — SIGNIFICANT CHANGE UP (ref 3.5–5.3)
RBC # BLD: 5.01 M/UL — SIGNIFICANT CHANGE UP (ref 4.2–5.8)
RBC # FLD: 12.3 % — SIGNIFICANT CHANGE UP (ref 10.3–14.5)
SODIUM SERPL-SCNC: 132 MMOL/L — LOW (ref 135–145)
SODIUM UR-SCNC: < 20 MMOL/L — SIGNIFICANT CHANGE UP
WBC # BLD: 13.64 K/UL — HIGH (ref 3.8–10.5)
WBC # FLD AUTO: 13.64 K/UL — HIGH (ref 3.8–10.5)

## 2019-09-14 PROCEDURE — 99233 SBSQ HOSP IP/OBS HIGH 50: CPT | Mod: GC

## 2019-09-14 RX ORDER — SODIUM CHLORIDE 9 MG/ML
1000 INJECTION INTRAMUSCULAR; INTRAVENOUS; SUBCUTANEOUS
Refills: 0 | Status: COMPLETED | OUTPATIENT
Start: 2019-09-14 | End: 2019-09-14

## 2019-09-14 RX ORDER — NYSTATIN 500MM UNIT
500000 POWDER (EA) MISCELLANEOUS
Refills: 0 | Status: DISCONTINUED | OUTPATIENT
Start: 2019-09-14 | End: 2019-09-30

## 2019-09-14 RX ADMIN — QUETIAPINE FUMARATE 25 MILLIGRAM(S): 200 TABLET, FILM COATED ORAL at 23:01

## 2019-09-14 RX ADMIN — SODIUM CHLORIDE 50 MILLILITER(S): 9 INJECTION INTRAMUSCULAR; INTRAVENOUS; SUBCUTANEOUS at 18:09

## 2019-09-14 RX ADMIN — Medication 0.25 MILLIGRAM(S): at 11:12

## 2019-09-14 RX ADMIN — Medication 4 MILLIGRAM(S): at 23:01

## 2019-09-14 RX ADMIN — Medication 3 MILLIGRAM(S): at 23:01

## 2019-09-14 RX ADMIN — LEVETIRACETAM 500 MILLIGRAM(S): 250 TABLET, FILM COATED ORAL at 06:56

## 2019-09-14 RX ADMIN — Medication 4 MILLIGRAM(S): at 11:12

## 2019-09-14 RX ADMIN — Medication 4 MILLIGRAM(S): at 18:09

## 2019-09-14 RX ADMIN — Medication 500000 UNIT(S): at 12:05

## 2019-09-14 RX ADMIN — Medication 0.25 MILLIGRAM(S): at 05:58

## 2019-09-14 RX ADMIN — Medication 500000 UNIT(S): at 23:00

## 2019-09-14 RX ADMIN — Medication 0.25 MILLIGRAM(S): at 18:09

## 2019-09-14 RX ADMIN — ENOXAPARIN SODIUM 40 MILLIGRAM(S): 100 INJECTION SUBCUTANEOUS at 11:11

## 2019-09-14 RX ADMIN — SODIUM CHLORIDE 50 MILLILITER(S): 9 INJECTION INTRAMUSCULAR; INTRAVENOUS; SUBCUTANEOUS at 23:01

## 2019-09-14 RX ADMIN — Medication 3 MILLIGRAM(S): at 02:38

## 2019-09-14 RX ADMIN — Medication 500000 UNIT(S): at 18:08

## 2019-09-14 RX ADMIN — PANTOPRAZOLE SODIUM 40 MILLIGRAM(S): 20 TABLET, DELAYED RELEASE ORAL at 06:56

## 2019-09-14 RX ADMIN — Medication 0.25 MILLIGRAM(S): at 23:00

## 2019-09-14 RX ADMIN — Medication 4 MILLIGRAM(S): at 06:56

## 2019-09-14 RX ADMIN — LEVETIRACETAM 500 MILLIGRAM(S): 250 TABLET, FILM COATED ORAL at 18:09

## 2019-09-14 NOTE — PROGRESS NOTE ADULT - PROBLEM SELECTOR PLAN 3
Pt hyponatremic to 126 on admission. 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 Pt responded to fluid restriction at that time  - c/w 1000 cc fluid restriction  - f/u urine Na, urine Cr, U osm, serum osm -on tongue  -start nystatin swish and swallow

## 2019-09-14 NOTE — DIETITIAN INITIAL EVALUATION ADULT. - OTHER INFO
Pt. alert, son @ bedside reports pt. unable to tolerate solid foods , taking some liquids - juice, Ensure Clear apple, today little better -bites of cake, fresh fruits like gauva, watermelon , will try more solids as pt. is upto it . During this conversation pt. was asking for french fries, fried chicken . pt.'s son informed on the current diet order for Clear Fluids & PO supplements .Met w/ medical team & discussed the feasibility for advancing diet , team will advance diet .

## 2019-09-14 NOTE — PROGRESS NOTE ADULT - ASSESSMENT
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 CT H redemonstrates numerous metastases without significant interval change with no evidence of hydrocephalus. Patient is planned for inpatient RT on Monday 9/16.

## 2019-09-14 NOTE — DIETITIAN INITIAL EVALUATION ADULT. - PHYSICAL APPEARANCE
other (specify) Nutrition focused physical exam conducted - found signs of malnutrition - SEVERE Subcutaneous fat loss:  Orbital fat pads region,  Buccal fat region, Triceps region,  Ribs region. SEVERE Muscle wasting:  Temples region,  Clavicle region , Shoulder region & Scapula region.

## 2019-09-14 NOTE — PROGRESS NOTE ADULT - PROBLEM SELECTOR PLAN 5
DVT ppx: Lovenox 40 mg qd  Diet: Clear liquid w/ ensurex2 and fluid restriction to 1000cc  Full code DVT ppx: Lovenox 40 mg qd  Diet: Clear liquid w/ ensurex3  Full code

## 2019-09-14 NOTE — PROGRESS NOTE ADULT - SUBJECTIVE AND OBJECTIVE BOX
Whit (Vick   Internal Medicine  PGY-1  Pager: [phone]; LI--[phone]    Patient is a 68y old  Male who presents with a chief complaint of blurry vision, HA, vertigo (13 Sep 2019 13:29)      SUBJECTIVE / OVERNIGHT EVENTS: No acute event ON. Denies HA/CP/SOB/abd pain/n/v/d/fever/chills.    MEDICATIONS  (STANDING):  dexamethasone     Tablet 4 milliGRAM(s) Oral every 6 hours  enoxaparin Injectable 40 milliGRAM(s) SubCutaneous daily  levETIRAcetam 500 milliGRAM(s) Oral two times a day  LORazepam   Injectable 0.25 milliGRAM(s) IV Push every 6 hours  pantoprazole    Tablet 40 milliGRAM(s) Oral before breakfast  QUEtiapine 25 milliGRAM(s) Oral at bedtime    MEDICATIONS  (PRN):  acetaminophen   Tablet .. 650 milliGRAM(s) Oral every 6 hours PRN Mild Pain (1 - 3)  HYDROmorphone  Injectable 0.5 milliGRAM(s) IV Push every 4 hours PRN Severe Pain (7 - 10)  LORazepam   Injectable 0.25 milliGRAM(s) IV Push every 4 hours PRN Nausea and/or Vomiting  meclizine 25 milliGRAM(s) Oral two times a day PRN Dizziness  melatonin 3 milliGRAM(s) Oral at bedtime PRN Insomnia  ondansetron    Tablet 4 milliGRAM(s) Oral every 6 hours PRN Nausea and/or Vomiting  polyethylene glycol 3350 17 Gram(s) Oral two times a day PRN Constipation        OBJECTIVE:    Vital Signs Last 24 Hrs  T(C): 36.4 (14 Sep 2019 05:58), Max: 36.8 (13 Sep 2019 13:20)  T(F): 97.6 (14 Sep 2019 05:58), Max: 98.2 (13 Sep 2019 13:20)  HR: 83 (14 Sep 2019 05:58) (65 - 83)  BP: 115/75 (14 Sep 2019 05:58) (104/76 - 120/72)  BP(mean): --  RR: 17 (14 Sep 2019 05:58) (17 - 20)  SpO2: 100% (14 Sep 2019 05:58) (97% - 100%)    PHYSICAL EXAM:  GENERAL: NAD, well-developed  HEAD:  Atraumatic, Normocephalic  EYES: EOMI, PERRLA, conjunctiva and sclera clear  NECK: Supple, No JVD  CHEST/LUNG: Clear to auscultation bilaterally; No wheeze  HEART: Regular rate and rhythm; No murmurs, rubs, or gallops  ABDOMEN: Soft, Nontender, Nondistended; Bowel sounds present  EXTREMITIES:  2+ Peripheral Pulses, No clubbing, cyanosis, or edema  PSYCH: AAOx3  NEUROLOGY: non-focal  SKIN: No rashes or lesions    CAPILLARY BLOOD GLUCOSE        I&O's Summary      LABS:                        14.3   13.64 )-----------( 258      ( 14 Sep 2019 06:44 )             41.7     09-13    127<L>  |  90<L>  |  22  ----------------------------<  165<H>  4.2   |  23  |  0.68    Ca    9.4      13 Sep 2019 08:10  Phos  4.0     09-13  Mg     2.3     09-13    TPro  6.8  /  Alb  3.9  /  TBili  0.6  /  DBili  x   /  AST  11  /  ALT  26  /  AlkPhos  86  09-13    PT/INR - ( 12 Sep 2019 18:18 )   PT: 11.4 SEC;   INR: 1.03          PTT - ( 12 Sep 2019 18:18 )  PTT:25.0 SEC              RADIOLOGY & ADDITIONAL TESTS: Whit (Zander)   Internal Medicine  PGY-1  Pager: [phone]; [phone]    Patient is a 68y old  Male who presents with a chief complaint of blurry vision, HA, vertigo (13 Sep 2019 13:29)      SUBJECTIVE / OVERNIGHT EVENTS: No acute event ON. Reports vertigo is better and no additional nausea/vomiting episodes ON. Tolerating liquid diet well and prefers to continue clear liquid. Denies HA/CP/SOB/abd pain/n/v/d/fever/chills.    MEDICATIONS  (STANDING):  dexamethasone     Tablet 4 milliGRAM(s) Oral every 6 hours  enoxaparin Injectable 40 milliGRAM(s) SubCutaneous daily  levETIRAcetam 500 milliGRAM(s) Oral two times a day  LORazepam   Injectable 0.25 milliGRAM(s) IV Push every 6 hours  pantoprazole    Tablet 40 milliGRAM(s) Oral before breakfast  QUEtiapine 25 milliGRAM(s) Oral at bedtime    MEDICATIONS  (PRN):  acetaminophen   Tablet .. 650 milliGRAM(s) Oral every 6 hours PRN Mild Pain (1 - 3)  HYDROmorphone  Injectable 0.5 milliGRAM(s) IV Push every 4 hours PRN Severe Pain (7 - 10)  LORazepam   Injectable 0.25 milliGRAM(s) IV Push every 4 hours PRN Nausea and/or Vomiting  meclizine 25 milliGRAM(s) Oral two times a day PRN Dizziness  melatonin 3 milliGRAM(s) Oral at bedtime PRN Insomnia  ondansetron    Tablet 4 milliGRAM(s) Oral every 6 hours PRN Nausea and/or Vomiting  polyethylene glycol 3350 17 Gram(s) Oral two times a day PRN Constipation        OBJECTIVE:    Vital Signs Last 24 Hrs  T(C): 36.4 (14 Sep 2019 05:58), Max: 36.8 (13 Sep 2019 13:20)  T(F): 97.6 (14 Sep 2019 05:58), Max: 98.2 (13 Sep 2019 13:20)  HR: 83 (14 Sep 2019 05:58) (65 - 83)  BP: 115/75 (14 Sep 2019 05:58) (104/76 - 120/72)  BP(mean): --  RR: 17 (14 Sep 2019 05:58) (17 - 20)  SpO2: 100% (14 Sep 2019 05:58) (97% - 100%)    PHYSICAL EXAM:  GENERAL: NAD, well-developed  HEAD:  Atraumatic, Normocephalic  EYES: EOMI, PERRLA, conjunctiva and sclera clear  NECK: Supple, No JVD  CHEST/LUNG: Clear to auscultation bilaterally; No wheeze  HEART: Regular rate and rhythm; No murmurs, rubs, or gallops  ABDOMEN: Soft, Nontender, Nondistended; Bowel sounds present  EXTREMITIES:  2+ Peripheral Pulses, No clubbing, cyanosis, or edema  PSYCH: AAOx3  NEUROLOGY: non-focal  SKIN: No rashes or lesions    CAPILLARY BLOOD GLUCOSE        I&O's Summary      LABS:                        14.3   13.64 )-----------( 258      ( 14 Sep 2019 06:44 )             41.7     09-13    127<L>  |  90<L>  |  22  ----------------------------<  165<H>  4.2   |  23  |  0.68    Ca    9.4      13 Sep 2019 08:10  Phos  4.0     09-13  Mg     2.3     09-13    TPro  6.8  /  Alb  3.9  /  TBili  0.6  /  DBili  x   /  AST  11  /  ALT  26  /  AlkPhos  86  09-13    PT/INR - ( 12 Sep 2019 18:18 )   PT: 11.4 SEC;   INR: 1.03          PTT - ( 12 Sep 2019 18:18 )  PTT:25.0 SEC              RADIOLOGY & ADDITIONAL TESTS: Whit (Zander)   Internal Medicine  PGY-1  Pager: NS--[phone]; [facility]--[phone]    Patient is a 68y old  Male who presents with a chief complaint of blurry vision, HA, vertigo (13 Sep 2019 13:29)      SUBJECTIVE / OVERNIGHT EVENTS: No acute event ON. Reports vertigo is better and no additional nausea/vomiting episodes ON. Tolerating liquid diet well and prefers to continue clear liquid diet. HA is better. Denies CP/SOB/abd pain/n/v/d/fever/chills.    MEDICATIONS  (STANDING):  dexamethasone     Tablet 4 milliGRAM(s) Oral every 6 hours  enoxaparin Injectable 40 milliGRAM(s) SubCutaneous daily  levETIRAcetam 500 milliGRAM(s) Oral two times a day  LORazepam   Injectable 0.25 milliGRAM(s) IV Push every 6 hours  pantoprazole    Tablet 40 milliGRAM(s) Oral before breakfast  QUEtiapine 25 milliGRAM(s) Oral at bedtime    MEDICATIONS  (PRN):  acetaminophen   Tablet .. 650 milliGRAM(s) Oral every 6 hours PRN Mild Pain (1 - 3)  HYDROmorphone  Injectable 0.5 milliGRAM(s) IV Push every 4 hours PRN Severe Pain (7 - 10)  LORazepam   Injectable 0.25 milliGRAM(s) IV Push every 4 hours PRN Nausea and/or Vomiting  meclizine 25 milliGRAM(s) Oral two times a day PRN Dizziness  melatonin 3 milliGRAM(s) Oral at bedtime PRN Insomnia  ondansetron    Tablet 4 milliGRAM(s) Oral every 6 hours PRN Nausea and/or Vomiting  polyethylene glycol 3350 17 Gram(s) Oral two times a day PRN Constipation        OBJECTIVE:    Vital Signs Last 24 Hrs  T(C): 36.4 (14 Sep 2019 05:58), Max: 36.8 (13 Sep 2019 13:20)  T(F): 97.6 (14 Sep 2019 05:58), Max: 98.2 (13 Sep 2019 13:20)  HR: 83 (14 Sep 2019 05:58) (65 - 83)  BP: 115/75 (14 Sep 2019 05:58) (104/76 - 120/72)  BP(mean): --  RR: 17 (14 Sep 2019 05:58) (17 - 20)  SpO2: 100% (14 Sep 2019 05:58) (97% - 100%)    PHYSICAL EXAM:  GENERAL: NAD, breathing comfortably on NC  HEAD:  Atraumatic, Normocephalic  MOUTH: oral thrush noted on tongue  CHEST/LUNG: Decreased breath sound b/l otherwise clear to auscultation bilaterally; No wheeze.   HEART: Regular rate and rhythm; No murmurs, rubs, or gallops  ABDOMEN: Soft, Nontender, Nondistended; Bowel sounds present  EXTREMITIES:No clubbing, cyanosis, or edema.   SKIN: No rashes or lesions    LABS:                        14.3   13.64 )-----------( 258      ( 14 Sep 2019 06:44 )             41.7     09-13    127<L>  |  90<L>  |  22  ----------------------------<  165<H>  4.2   |  23  |  0.68    Ca    9.4      13 Sep 2019 08:10  Phos  4.0     09-13  Mg     2.3     09-13    TPro  6.8  /  Alb  3.9  /  TBili  0.6  /  DBili  x   /  AST  11  /  ALT  26  /  AlkPhos  86  09-13    PT/INR - ( 12 Sep 2019 18:18 )   PT: 11.4 SEC;   INR: 1.03          PTT - ( 12 Sep 2019 18:18 )  PTT:25.0 SEC

## 2019-09-14 NOTE — PROGRESS NOTE ADULT - PROBLEM SELECTOR PLAN 2
Pt diagnosed on recent admission 8/28-9/5. S/p RUL EMNB-guided biopsy and FNA performed 8/29/19, positive for malignant cells. Molecular testing is pending.   - oncology and radiation oncology c/s. f/u recs as above  - consider palliative consulted for continued GOC and sx management. f/u recs as above -126 likely pre-renal given urine lyte results   - start gental fluid 50cc/hr for 12hrs  - dc fluid restriction

## 2019-09-14 NOTE — PROGRESS NOTE ADULT - PROBLEM SELECTOR PLAN 1
Pt found to have brain lesions in July 2019, likely 2/2 NSCLC. Now came to ED with worsening vertigo, n/v. CTH redemonstrated multiple intracranial metastases, largest 9.3 cm in the R frontal region, but also with numerous cerebellar lesions. No hydrocephalus.  - pt evaluated by Neurosurgery in the ED, not candidate for surgery  - oncology, rad onc and palliative care c/s. f/u recs  - rad onc: planned for inpatient RT on Monday 9/16   - c/w decadron 4 mg q6 hr  - c/w Keppra 500 mg bid for seizure prophylaxis  - PPI prophylaxis  - c/w zofran and meclizine for n/v and vertigo management  - dilaudid 0.5mg q4h for HA  - fall precautions  - neuro checks -2/2 NSCLC  -CTH redemonstrated multiple intracranial metastases. No hydrocephalus.  - Neurosurgery consulted: not candidate for surgery  - oncology, rad onc and palliative care recs appreciated  - rad onc: planned for inpatient RT on Monday 9/16   - c/w decadron 4 mg q6 hr  - c/w Keppra 500 mg bid for seizure prophylaxis  - c/w zofran and meclizine for n/v and vertigo management  - dilaudid 0.5mg q4h for HA  - fall precautions  - neuro checks

## 2019-09-15 ENCOUNTER — TRANSCRIPTION ENCOUNTER (OUTPATIENT)
Age: 69
End: 2019-09-15

## 2019-09-15 LAB
ANION GAP SERPL CALC-SCNC: 17 MMO/L — HIGH (ref 7–14)
BUN SERPL-MCNC: 16 MG/DL — SIGNIFICANT CHANGE UP (ref 7–23)
CALCIUM SERPL-MCNC: 8.7 MG/DL — SIGNIFICANT CHANGE UP (ref 8.4–10.5)
CHLORIDE SERPL-SCNC: 94 MMOL/L — LOW (ref 98–107)
CO2 SERPL-SCNC: 22 MMOL/L — SIGNIFICANT CHANGE UP (ref 22–31)
CREAT SERPL-MCNC: 0.53 MG/DL — SIGNIFICANT CHANGE UP (ref 0.5–1.3)
GLUCOSE SERPL-MCNC: 118 MG/DL — HIGH (ref 70–99)
HCT VFR BLD CALC: 38.5 % — LOW (ref 39–50)
HGB BLD-MCNC: 13.3 G/DL — SIGNIFICANT CHANGE UP (ref 13–17)
MCHC RBC-ENTMCNC: 28.5 PG — SIGNIFICANT CHANGE UP (ref 27–34)
MCHC RBC-ENTMCNC: 34.5 % — SIGNIFICANT CHANGE UP (ref 32–36)
MCV RBC AUTO: 82.6 FL — SIGNIFICANT CHANGE UP (ref 80–100)
NRBC # FLD: 0 K/UL — SIGNIFICANT CHANGE UP (ref 0–0)
PLATELET # BLD AUTO: 251 K/UL — SIGNIFICANT CHANGE UP (ref 150–400)
PMV BLD: 9.8 FL — SIGNIFICANT CHANGE UP (ref 7–13)
POTASSIUM SERPL-MCNC: 4.3 MMOL/L — SIGNIFICANT CHANGE UP (ref 3.5–5.3)
POTASSIUM SERPL-SCNC: 4.3 MMOL/L — SIGNIFICANT CHANGE UP (ref 3.5–5.3)
RBC # BLD: 4.66 M/UL — SIGNIFICANT CHANGE UP (ref 4.2–5.8)
RBC # FLD: 12.4 % — SIGNIFICANT CHANGE UP (ref 10.3–14.5)
SODIUM SERPL-SCNC: 133 MMOL/L — LOW (ref 135–145)
WBC # BLD: 14.21 K/UL — HIGH (ref 3.8–10.5)
WBC # FLD AUTO: 14.21 K/UL — HIGH (ref 3.8–10.5)

## 2019-09-15 PROCEDURE — 99233 SBSQ HOSP IP/OBS HIGH 50: CPT | Mod: GC

## 2019-09-15 RX ADMIN — QUETIAPINE FUMARATE 25 MILLIGRAM(S): 200 TABLET, FILM COATED ORAL at 22:11

## 2019-09-15 RX ADMIN — ENOXAPARIN SODIUM 40 MILLIGRAM(S): 100 INJECTION SUBCUTANEOUS at 11:21

## 2019-09-15 RX ADMIN — LEVETIRACETAM 500 MILLIGRAM(S): 250 TABLET, FILM COATED ORAL at 17:36

## 2019-09-15 RX ADMIN — LEVETIRACETAM 500 MILLIGRAM(S): 250 TABLET, FILM COATED ORAL at 06:13

## 2019-09-15 RX ADMIN — Medication 0.25 MILLIGRAM(S): at 17:36

## 2019-09-15 RX ADMIN — Medication 4 MILLIGRAM(S): at 11:21

## 2019-09-15 RX ADMIN — Medication 500000 UNIT(S): at 17:36

## 2019-09-15 RX ADMIN — Medication 500000 UNIT(S): at 11:21

## 2019-09-15 RX ADMIN — Medication 4 MILLIGRAM(S): at 06:13

## 2019-09-15 RX ADMIN — Medication 500000 UNIT(S): at 06:13

## 2019-09-15 RX ADMIN — Medication 4 MILLIGRAM(S): at 17:36

## 2019-09-15 RX ADMIN — Medication 0.25 MILLIGRAM(S): at 06:13

## 2019-09-15 RX ADMIN — Medication 0.25 MILLIGRAM(S): at 11:21

## 2019-09-15 RX ADMIN — PANTOPRAZOLE SODIUM 40 MILLIGRAM(S): 20 TABLET, DELAYED RELEASE ORAL at 06:13

## 2019-09-15 NOTE — PROGRESS NOTE ADULT - SUBJECTIVE AND OBJECTIVE BOX
Dr. Arnav Hutchison  Internal Medicine PGY-2  Pager# [phone]    Patient is a 68y old  Male who presents with a chief complaint of blurry vision, HA, vertigo (15 Sep 2019 09:37)      SUBJECTIVE / OVERNIGHT EVENTS: No acute events overnight. Denies n/v/f/c. His last BM was yesterday and it was solid in consistency. Reports vertigo is improving.     MEDICATIONS  (STANDING):  dexamethasone     Tablet 4 milliGRAM(s) Oral every 6 hours  enoxaparin Injectable 40 milliGRAM(s) SubCutaneous daily  levETIRAcetam 500 milliGRAM(s) Oral two times a day  LORazepam   Injectable 0.25 milliGRAM(s) IV Push every 6 hours  nystatin    Suspension 883995 Unit(s) Oral four times a day  pantoprazole    Tablet 40 milliGRAM(s) Oral before breakfast  QUEtiapine 25 milliGRAM(s) Oral at bedtime    MEDICATIONS  (PRN):  acetaminophen   Tablet .. 650 milliGRAM(s) Oral every 6 hours PRN Mild Pain (1 - 3)  HYDROmorphone  Injectable 0.5 milliGRAM(s) IV Push every 4 hours PRN Severe Pain (7 - 10)  LORazepam   Injectable 0.25 milliGRAM(s) IV Push every 4 hours PRN Nausea and/or Vomiting  meclizine 25 milliGRAM(s) Oral two times a day PRN Dizziness  melatonin 3 milliGRAM(s) Oral at bedtime PRN Insomnia  ondansetron    Tablet 4 milliGRAM(s) Oral every 6 hours PRN Nausea and/or Vomiting  polyethylene glycol 3350 17 Gram(s) Oral two times a day PRN Constipation      Vital Signs Last 24 Hrs  T(C): 36.4 (15 Sep 2019 06:08), Max: 36.4 (15 Sep 2019 06:08)  T(F): 97.5 (15 Sep 2019 06:08), Max: 97.5 (15 Sep 2019 06:08)  HR: 78 (15 Sep 2019 06:08) (75 - 87)  BP: 115/73 (15 Sep 2019 06:08) (108/67 - 117/70)  BP(mean): --  RR: 14 (15 Sep 2019 06:08) (14 - 18)  SpO2: 99% (15 Sep 2019 06:08) (97% - 99%)  CAPILLARY BLOOD GLUCOSE        I&O's Summary    14 Sep 2019 07:01  -  15 Sep 2019 07:00  --------------------------------------------------------  IN: 400 mL / OUT: 0 mL / NET: 400 mL        PHYSICAL EXAM:  GENERAL: NAD, well-developed  CHEST/LUNG: Clear to auscultation bilaterally; No wheeze  HEART: Regular rate and rhythm; No murmurs, rubs, or gallops  ABDOMEN: Soft, Nontender, Nondistended; Bowel sounds present  EXTREMITIES:  2+ Peripheral Pulses, No clubbing, cyanosis, or edema  PSYCH: AAOx3  NEUROLOGY: non-focal  SKIN: No rashes or lesions    LABS:                        13.3   14.21 )-----------( 251      ( 15 Sep 2019 07:01 )             38.5     09-15    133<L>  |  94<L>  |  16  ----------------------------<  118<H>  4.3   |  22  |  0.53    Ca    8.7      15 Sep 2019 07:01                RADIOLOGY & ADDITIONAL TESTS:    Imaging Personally Reviewed:    Consultant(s) Notes Reviewed:      Care Discussed with Consultants/Other Providers:

## 2019-09-15 NOTE — DISCHARGE NOTE PROVIDER - CARE PROVIDER_API CALL
Cullen Boyce)  Hematology; Medical Oncology  [address] [place] [address]  Phone: [phone]  Fax: [phone]  Follow Up Time:

## 2019-09-15 NOTE — DISCHARGE NOTE PROVIDER - HOSPITAL COURSE
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 CT H redemonstrates numerous metastases without significant interval change with no evidence of hydrocephalus. Rad onc, onc and palliative are onboard. Patient is symptomatically treated while awaiting for RT. Patient is planned for inpatient RT on Monday 9/16. Oral thrush started on nystatin swish and swallow. 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 CT H redemonstrates numerous metastases without significant interval change with no evidence of hydrocephalus. Rad onc, onc and palliative are onboard. Patient was symptomatically treated and received radiation therapy. Oral thrush started on nystatin swish and swallow. 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 Symptoms were due to the lesions in the brain. CT H redemonstrated numerous metastases without significant interval change with no evidence of hydrocephalus. Patient was not a neurosurgical candidate. Radiation oncology, oncology and palliative were consulted. Patient was symptomatically treated and received radiation therapy for a total of 10 treatments. Nystatin swish and swallow was started for oral thrush. PO intake was encouraged but patient required fluids for dehydration. He has now completed his last radiation treatment and would like to go home with hospice care. He is medically stable to be discharged to ______. 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 Symptoms were due to the lesions in the brain. CT H redemonstrated numerous metastases without significant interval change with no evidence of hydrocephalus. Patient was not a neurosurgical candidate. Radiation oncology, oncology and palliative were consulted. Patient was symptomatically treated and received radiation therapy for a total of 10 treatments. Nystatin swish and swallow was started for oral thrush. PO intake was encouraged but patient required fluids for dehydration. He has now completed his last radiation treatment and would like to go home with hospice care. He is medically stable to be discharged to home with home hospice. 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 Symptoms were due to the lesions in the brain. CTH redemonstrated numerous metastases without significant interval change with no evidence of hydrocephalus. Patient was not a neurosurgical candidate. Radiation oncology, oncology and palliative were consulted. Patient was symptomatically treated and received whole brain radiation therapy for a total of 10 treatments. Nystatin swish and swallow was started for oral thrush. PO intake was encouraged but patient required fluids for dehydration. He has now completed his last radiation treatment and would like to go home with hospice care. He is medically stable to be discharged to home with home hospice.

## 2019-09-15 NOTE — DISCHARGE NOTE PROVIDER - NSDCFUSCHEDAPPT_GEN_ALL_CORE_FT
MD ARLIN CARABALLO ; 09/18/2019 ; [facility] [address] [place] [address]  MD ARLIN CARABALLO ; 10/17/2019 ; [facility] MD ARLIN CARABALLO ; 10/17/2019 ; [facility]

## 2019-09-15 NOTE — PROGRESS NOTE ADULT - PROBLEM SELECTOR PLAN 1
-2/2 NSCLC  -CTH redemonstrated multiple intracranial metastases. No hydrocephalus.  - Neurosurgery consulted: not candidate for surgery  - oncology, rad onc and palliative care recs appreciated  - c/w decadron 4 mg q6 hr. Uptrending wbc likely in the setting of decadron, afebrile.  - c/w Keppra 500 mg bid for seizure prophylaxis  - c/w zofran and meclizine for n/v and vertigo management  - dilaudid 0.5mg q4h for HA  - fall precautions  - neuro checks  - rad onc: planned for inpatient RT on Monday 9/16

## 2019-09-15 NOTE — DISCHARGE NOTE PROVIDER - NSFOLLOWUPCLINICS_GEN_ALL_ED_FT
[facility]  Hematology/Oncology  [address] [id number]  Phone: [phone]  Fax:

## 2019-09-15 NOTE — DISCHARGE NOTE PROVIDER - NSDCCPCAREPLAN_GEN_ALL_CORE_FT
PRINCIPAL DISCHARGE DIAGNOSIS  Diagnosis: Brain metastases  Assessment and Plan of Treatment: You were treated with medications that help control nausea and vomiting and palliative radiation therapy.      SECONDARY DISCHARGE DIAGNOSES  Diagnosis: Dizziness  Assessment and Plan of Treatment:     Diagnosis: Nausea  Assessment and Plan of Treatment: Nausea    Diagnosis: Palliative care encounter  Assessment and Plan of Treatment: Palliative care encounter    Diagnosis: Lung cancer metastatic to brain  Assessment and Plan of Treatment: Lung cancer metastatic to brain PRINCIPAL DISCHARGE DIAGNOSIS  Diagnosis: Brain metastases  Assessment and Plan of Treatment: You came to the hospital for dizziness, headaches, and blurry vision. CT scan of your head showed multiple lesions in your brain which came from your lung cancer. Your symptoms are most likely cause by the lesions in your brain and we treated your symptoms with medications and pallative radiation therapy. You recieved 10 radiation treatments in hopes of decreasing the size of the brain lesions. You completed your last treatment on Monday 9/30 and you are medically stable to be discharged home. Please follow up with your PCP and your oncologist within 2 weeks.   If you experience worsening blurry vision, headache, weakness, hallucinations, or confusion, please visit the ED immediately.      SECONDARY DISCHARGE DIAGNOSES  Diagnosis: Lung cancer  Assessment and Plan of Treatment: You were diagnosed with nonsmall cell lung cancer based on biospy sample that we took from your lung. The cancer in your lung spread to your brain which cause you symptoms of blurry vision, dizziness, and headaches. You underwent pallative radiation therapy to help decrease your symptoms and completed your treatment. During your hospital stay, you did not complain of shortness of breath or chest pain. Please follow up with your oncologist and PCP within 2 weeks   If you experience any chest pain or shortness of breath, please visit the ED immediately.    Diagnosis: Dizziness  Assessment and Plan of Treatment:     Diagnosis: Nausea  Assessment and Plan of Treatment: Nausea    Diagnosis: Palliative care encounter  Assessment and Plan of Treatment: Palliative care encounter    Diagnosis: Lung cancer metastatic to brain  Assessment and Plan of Treatment: Lung cancer metastatic to brain PRINCIPAL DISCHARGE DIAGNOSIS  Diagnosis: Brain metastases  Assessment and Plan of Treatment: You came to the hospital for dizziness, headaches, and blurry vision. CT scan of your head showed multiple lesions in your brain which came from your lung cancer. Your symptoms are most likely cause by the lesions in your brain and we treated your symptoms with medications and pallative radiation therapy. You recieved 10 radiation treatments in hopes of decreasing the size of the brain lesions. You completed your last treatment on Monday 9/30 and you are medically stable to be discharged home. Please follow up with your PCP and your oncologist within 2 weeks.   If you experience worsening blurry vision, headache, weakness, hallucinations, or confusion, please visit the ED immediately.      SECONDARY DISCHARGE DIAGNOSES  Diagnosis: Lung cancer  Assessment and Plan of Treatment: You were diagnosed with nonsmall cell lung cancer based on biospy sample that we took from your lung. The cancer in your lung spread to your brain which cause you symptoms of blurry vision, dizziness, and headaches. You underwent pallative radiation therapy to help decrease your symptoms and completed your treatment. During your hospital stay, you did not complain of shortness of breath or chest pain. Please follow up with your oncologist and PCP within 2 weeks   If you experience any chest pain or shortness of breath, please visit the ED immediately.

## 2019-09-16 DIAGNOSIS — Z71.89 OTHER SPECIFIED COUNSELING: ICD-10-CM

## 2019-09-16 LAB
AMMONIA BLD-MCNC: 42 UMOL/L — SIGNIFICANT CHANGE UP (ref 11–55)
ANION GAP SERPL CALC-SCNC: 17 MMO/L — HIGH (ref 7–14)
BUN SERPL-MCNC: 19 MG/DL — SIGNIFICANT CHANGE UP (ref 7–23)
CALCIUM SERPL-MCNC: 8.5 MG/DL — SIGNIFICANT CHANGE UP (ref 8.4–10.5)
CHLORIDE SERPL-SCNC: 96 MMOL/L — LOW (ref 98–107)
CO2 SERPL-SCNC: 19 MMOL/L — LOW (ref 22–31)
CREAT SERPL-MCNC: 0.62 MG/DL — SIGNIFICANT CHANGE UP (ref 0.5–1.3)
GLUCOSE SERPL-MCNC: 174 MG/DL — HIGH (ref 70–99)
HCT VFR BLD CALC: 36.9 % — LOW (ref 39–50)
HGB BLD-MCNC: 12.7 G/DL — LOW (ref 13–17)
MCHC RBC-ENTMCNC: 28.9 PG — SIGNIFICANT CHANGE UP (ref 27–34)
MCHC RBC-ENTMCNC: 34.4 % — SIGNIFICANT CHANGE UP (ref 32–36)
MCV RBC AUTO: 83.9 FL — SIGNIFICANT CHANGE UP (ref 80–100)
NRBC # FLD: 0 K/UL — SIGNIFICANT CHANGE UP (ref 0–0)
PLATELET # BLD AUTO: 222 K/UL — SIGNIFICANT CHANGE UP (ref 150–400)
PMV BLD: 9.5 FL — SIGNIFICANT CHANGE UP (ref 7–13)
POTASSIUM SERPL-MCNC: 2.9 MMOL/L — CRITICAL LOW (ref 3.5–5.3)
POTASSIUM SERPL-SCNC: 2.9 MMOL/L — CRITICAL LOW (ref 3.5–5.3)
RBC # BLD: 4.4 M/UL — SIGNIFICANT CHANGE UP (ref 4.2–5.8)
RBC # FLD: 12.3 % — SIGNIFICANT CHANGE UP (ref 10.3–14.5)
SODIUM SERPL-SCNC: 132 MMOL/L — LOW (ref 135–145)
WBC # BLD: 12.02 K/UL — HIGH (ref 3.8–10.5)
WBC # FLD AUTO: 12.02 K/UL — HIGH (ref 3.8–10.5)

## 2019-09-16 PROCEDURE — 77280 THER RAD SIMULAJ FIELD SMPL: CPT | Mod: 26

## 2019-09-16 PROCEDURE — 77307 TELETHX ISODOSE PLAN CPLX: CPT | Mod: 26

## 2019-09-16 PROCEDURE — 99233 SBSQ HOSP IP/OBS HIGH 50: CPT | Mod: GC

## 2019-09-16 PROCEDURE — 99497 ADVNCD CARE PLAN 30 MIN: CPT | Mod: 25

## 2019-09-16 PROCEDURE — 99233 SBSQ HOSP IP/OBS HIGH 50: CPT

## 2019-09-16 PROCEDURE — 77334 RADIATION TREATMENT AID(S): CPT | Mod: 26

## 2019-09-16 RX ORDER — POTASSIUM CHLORIDE 20 MEQ
20 PACKET (EA) ORAL
Refills: 0 | Status: COMPLETED | OUTPATIENT
Start: 2019-09-16 | End: 2019-09-16

## 2019-09-16 RX ADMIN — LEVETIRACETAM 500 MILLIGRAM(S): 250 TABLET, FILM COATED ORAL at 17:07

## 2019-09-16 RX ADMIN — Medication 4 MILLIGRAM(S): at 17:07

## 2019-09-16 RX ADMIN — Medication 20 MILLIEQUIVALENT(S): at 09:11

## 2019-09-16 RX ADMIN — Medication 4 MILLIGRAM(S): at 23:01

## 2019-09-16 RX ADMIN — Medication 500000 UNIT(S): at 11:26

## 2019-09-16 RX ADMIN — Medication 4 MILLIGRAM(S): at 11:26

## 2019-09-16 RX ADMIN — LEVETIRACETAM 500 MILLIGRAM(S): 250 TABLET, FILM COATED ORAL at 05:54

## 2019-09-16 RX ADMIN — Medication 0.25 MILLIGRAM(S): at 05:55

## 2019-09-16 RX ADMIN — Medication 0.25 MILLIGRAM(S): at 17:07

## 2019-09-16 RX ADMIN — Medication 500000 UNIT(S): at 00:36

## 2019-09-16 RX ADMIN — Medication 4 MILLIGRAM(S): at 00:36

## 2019-09-16 RX ADMIN — PANTOPRAZOLE SODIUM 40 MILLIGRAM(S): 20 TABLET, DELAYED RELEASE ORAL at 06:05

## 2019-09-16 RX ADMIN — ENOXAPARIN SODIUM 40 MILLIGRAM(S): 100 INJECTION SUBCUTANEOUS at 11:26

## 2019-09-16 RX ADMIN — Medication 20 MILLIEQUIVALENT(S): at 13:40

## 2019-09-16 RX ADMIN — QUETIAPINE FUMARATE 25 MILLIGRAM(S): 200 TABLET, FILM COATED ORAL at 23:00

## 2019-09-16 RX ADMIN — Medication 0.25 MILLIGRAM(S): at 00:36

## 2019-09-16 RX ADMIN — Medication 25 MILLIGRAM(S): at 11:27

## 2019-09-16 RX ADMIN — Medication 500000 UNIT(S): at 05:54

## 2019-09-16 RX ADMIN — Medication 4 MILLIGRAM(S): at 05:54

## 2019-09-16 RX ADMIN — Medication 500000 UNIT(S): at 17:07

## 2019-09-16 RX ADMIN — Medication 20 MILLIEQUIVALENT(S): at 11:26

## 2019-09-16 RX ADMIN — Medication 500000 UNIT(S): at 23:01

## 2019-09-16 RX ADMIN — Medication 0.25 MILLIGRAM(S): at 11:41

## 2019-09-16 NOTE — PROGRESS NOTE ADULT - PROBLEM SELECTOR PLAN 5
Patient follows Dr. Boyce in the community. Given active symptoms not a candidate for treatment at this time. Pending further pathology. Appreciate Oncology and Radiation Oncology involvement.

## 2019-09-16 NOTE — PROGRESS NOTE ADULT - SUBJECTIVE AND OBJECTIVE BOX
INTERVAL HPI/OVERNIGHT EVENTS:    Code Status:   Allergies    No Known Allergies    Intolerances    MEDICATIONS  (STANDING):  dexamethasone     Tablet 4 milliGRAM(s) Oral every 6 hours  enoxaparin Injectable 40 milliGRAM(s) SubCutaneous daily  levETIRAcetam 500 milliGRAM(s) Oral two times a day  LORazepam   Injectable 0.25 milliGRAM(s) IV Push every 6 hours  nystatin    Suspension 062172 Unit(s) Oral four times a day  pantoprazole    Tablet 40 milliGRAM(s) Oral before breakfast  potassium chloride    Tablet ER 20 milliEquivalent(s) Oral every 2 hours  QUEtiapine 25 milliGRAM(s) Oral at bedtime    MEDICATIONS  (PRN):  acetaminophen   Tablet .. 650 milliGRAM(s) Oral every 6 hours PRN Mild Pain (1 - 3)  HYDROmorphone  Injectable 0.5 milliGRAM(s) IV Push every 4 hours PRN Severe Pain (7 - 10)  LORazepam   Injectable 0.25 milliGRAM(s) IV Push every 4 hours PRN Nausea and/or Vomiting  meclizine 25 milliGRAM(s) Oral two times a day PRN Dizziness  melatonin 3 milliGRAM(s) Oral at bedtime PRN Insomnia  ondansetron    Tablet 4 milliGRAM(s) Oral every 6 hours PRN Nausea and/or Vomiting  polyethylene glycol 3350 17 Gram(s) Oral two times a day PRN Constipation      PRESENT SYMPTOMS: [ ]Unable to obtain due to poor mentation   Source if other than patient:  [ ]Family   [ ]Team     Pain (Impact on QOL):    Location:  Severity:  Minimal acceptable level (0-10 scale):       Quality:       Onset:  Duration:  Aggravating factors:  Relieving Factors  Radiation:    Dyspnea:  Yes [ ] No [ ] - [ ]Mild [ ]Moderate [ ]Severe  Anxiety:    Yes [ ] No [ ] - [ ]Mild [ ]Moderate [ ]Severe  Fatigue:    Yes [ ] No [ ] - [ ]Mild [ ]Moderate [ ]Severe  Nausea:    Yes [ ] No [ ] - [ ]Mild [ ]Moderate [ ]Severe                         Loss of appetite: Yes [ ] No [ ] - [ ]Mild [ ]Moderate [ ]Severe             Constipation:  Yes [ ] No [ ] - [ ]Mild [ ]Moderate [ ]Severe  Grief:  Yes [ ] No [ ]     PAIN AD Score:	  http://geriatrictoolkit.[email or web address]/cog/painad.pdf (Ctrl + left click to view)    Other Symptoms:  [ ]All other review of systems negative     Karnofsky Performance Score/Palliative Performance Status Version 2:         %    http://palliative.info/resource_material/PPSv2.pdf    PHYSICAL EXAM:  Vital Signs Last 24 Hrs  T(C): 36.8 (16 Sep 2019 05:49), Max: 36.8 (16 Sep 2019 05:49)  T(F): 98.2 (16 Sep 2019 05:49), Max: 98.2 (16 Sep 2019 05:49)  HR: 96 (16 Sep 2019 05:49) (88 - 96)  BP: 108/69 (16 Sep 2019 05:49) (106/69 - 127/67)  BP(mean): --  RR: 18 (16 Sep 2019 05:49) (16 - 18)  SpO2: 99% (16 Sep 2019 05:49) (99% - 100%) I&O's Summary    15 Sep 2019 07:01  -  16 Sep 2019 07:00  --------------------------------------------------------  IN: 340 mL / OUT: 550 mL / NET: -210 mL     GENERAL:  [ ]Alert  [ ]Oriented x   [ ]Lethargic  [ ]Cachexia  [ ]Unarousable  [ ]Verbal  [ ]Non-Verbal  Behavioral:   [ ] Anxiety  [ ] Delirium [ ] Agitation [ ] Other  HEENT:  [ ]Normal   [ ]Dry mouth   [ ]ET Tube/Trach  [ ]Oral lesions  PULMONARY:   [ ]Clear [ ]Tachypnea  [ ]Audible excessive secretions   [ ]Rhonchi        [ ]Right [ ]Left [ ]Bilateral  [ ]Crackles        [ ]Right [ ]Left [ ]Bilateral  [ ]Wheezing     [ ]Right [ ]Left [ ]Bilateral  CARDIOVASCULAR:    [ ]Regular [ ]Irregular [ ]Tachy  [ ]Bryson [ ]Murmur [ ]Other  GASTROINTESTINAL:  [ ]Soft  [ ]Distended   [ ]+BS  [ ]Non tender [ ]Tender  [ ]PEG [ ]OGT/ NGT   Last BM:      GENITOURINARY:  [ ]Normal [ ] Incontinent   [ ]Oliguria/Anuria   [ ]Jimenez  MUSCULOSKELETAL:   [ ]Normal   [ ]Weakness  [ ]Bed/Wheelchair bound [ ]Edema  NEUROLOGIC:   [ ]No focal deficits  [ ] Cognitive impairment  [ ] Dysphagia [ ]Dysarthria [ ] Paresis [ ]Other   SKIN:   [ ]Normal   [ ]Pressure ulcer(s)  [ ]Rash    CRITICAL CARE:  [ ] Shock Present  [ ]Septic [ ]Cardiogenic [ ]Neurologic [ ]Hypovolemic  [ ]  Vasopressors [ ]  Inotropes   [ ] Respiratory failure present  [ ] Acute  [ ] Chronic [ ] Hypoxic  [ ] Hypercarbic [ ] Other  [ ] Other organ failure     LABS:                        12.7   12.02 )-----------( 222      ( 16 Sep 2019 06:36 )             36.9   09-16    132<L>  |  96<L>  |  19  ----------------------------<  174<H>  2.9<LL>   |  19<L>  |  0.62    Ca    8.5      16 Sep 2019 06:36          RADIOLOGY & ADDITIONAL STUDIES:    Protein Calorie Malnutrition Present: [ ] yes [ ] no  [ ] PPSV2 < or = 30%  [ ] significant weight loss [ ] poor nutritional intake [ ] anasarca [ ] catabolic state Albumin, Serum: 3.9 g/dL (09-13-19 @ 08:10)      REFERRALS:   [ ]Chaplaincy  [ ] Hospice  [ ]Child Life  [ ]Social Work  [ ]Case management [ ]Holistic Therapy   Goals of Care Document: INTERVAL HPI/OVERNIGHT EVENTS:    Patient resting in bed with his son in law at the bedside. Patients states that nausea has improved sligghtly, but continue to have vertigo.     Code Status: Full code  Allergies    No Known Allergies    Intolerances    MEDICATIONS  (STANDING):  dexamethasone     Tablet 4 milliGRAM(s) Oral every 6 hours  enoxaparin Injectable 40 milliGRAM(s) SubCutaneous daily  levETIRAcetam 500 milliGRAM(s) Oral two times a day  LORazepam   Injectable 0.25 milliGRAM(s) IV Push every 6 hours  nystatin    Suspension 126430 Unit(s) Oral four times a day  pantoprazole    Tablet 40 milliGRAM(s) Oral before breakfast  potassium chloride    Tablet ER 20 milliEquivalent(s) Oral every 2 hours  QUEtiapine 25 milliGRAM(s) Oral at bedtime    MEDICATIONS  (PRN):  acetaminophen   Tablet .. 650 milliGRAM(s) Oral every 6 hours PRN Mild Pain (1 - 3)  HYDROmorphone  Injectable 0.5 milliGRAM(s) IV Push every 4 hours PRN Severe Pain (7 - 10)  LORazepam   Injectable 0.25 milliGRAM(s) IV Push every 4 hours PRN Nausea and/or Vomiting  meclizine 25 milliGRAM(s) Oral two times a day PRN Dizziness  melatonin 3 milliGRAM(s) Oral at bedtime PRN Insomnia  ondansetron    Tablet 4 milliGRAM(s) Oral every 6 hours PRN Nausea and/or Vomiting  polyethylene glycol 3350 17 Gram(s) Oral two times a day PRN Constipation      PRESENT SYMPTOMS: [ ]Unable to obtain due to poor mentation   Source if other than patient:  [ ]Family   [ ]Team     Pain (Impact on QOL):  No pain  Location:  Severity:  Minimal acceptable level (0-10 scale):       Quality:       Onset:  Duration:  Aggravating factors:  Relieving Factors  Radiation:    Dyspnea:  Yes [ ] No [x ] - [ ]Mild [ ]Moderate [ ]Severe  Anxiety:    Yes [ ] No [x ] - [ ]Mild [ ]Moderate [ ]Severe  Fatigue:    Yes [x ] No [ ] - [ ]Mild [ x]Moderate [ ]Severe  Nausea:    Yes [x ] No [ ] - [x ]Mild [ ]Moderate [ ]Severe                         Loss of appetite: Yes [ ] No [ x] - [ ]Mild [ ]Moderate [ ]Severe             Constipation:  Yes [ ] No [x ] - [ ]Mild [ ]Moderate [ ]Severe  Grief:  Yes [ ] No [x ]     PAIN AD Score:	  http://geriatrictoolkit.[place].[email or web address]/cog/painad.pdf (Ctrl + left click to view)    Other Symptoms:  [ ]All other review of systems negative     Karnofsky Performance Score/Palliative Performance Status Version 2:       60  %    http://palliative.info/resource_material/[email or web address].pdf    PHYSICAL EXAM:  Vital Signs Last 24 Hrs  T(C): 36.8 (16 Sep 2019 05:49), Max: 36.8 (16 Sep 2019 05:49)  T(F): 98.2 (16 Sep 2019 05:49), Max: 98.2 (16 Sep 2019 05:49)  HR: 96 (16 Sep 2019 05:49) (88 - 96)  BP: 108/69 (16 Sep 2019 05:49) (106/69 - 127/67)  BP(mean): --  RR: 18 (16 Sep 2019 05:49) (16 - 18)  SpO2: 99% (16 Sep 2019 05:49) (99% - 100%) I&O's Summary    15 Sep 2019 07:01  -  16 Sep 2019 07:00  --------------------------------------------------------  IN: 340 mL / OUT: 550 mL / NET: -210 mL    GENERAL:  [x ]Alert  [x ]Oriented x 3   [ ]Lethargic  [ ]Cachexia  [ ]Unarousable  [x ]Verbal  [ ]Non-Verbal  Behavioral:   [ ] Anxiety  [ ] Delirium [ ] Agitation [ ] Other  HEENT:  [x ]Normal   [ ]Dry mouth   [ ]ET Tube/Trach  [ ]Oral lesions  PULMONARY:   [x ]Clear  [ ]Tachypnea  [ ]Audible excessive secretions   [ ]Rhonchi        [ ]Right [ ]Left [ ]Bilateral  [ ]Crackles        [ ]Right [ ]Left [ ]Bilateral  [ ]Wheezing     [ ]Right [ ]Left [ ]Bilateral  CARDIOVASCULAR:    [x ]Regular [ ]Irregular [ ]Tachy  [ ]Bryson [ ]Murmur [ ]Other  GASTROINTESTINAL:  [x ]Soft  [ ]Distended   [x ]+BS  [x ]Non tender [ ]Tender  [ ]PEG [ ]OGT/ NGT  Last BM: 9/11/2019  GENITOURINARY:  [x ]Normal [ ] Incontinent   [ ]Oliguria/Anuria   [ ]Jimenez  MUSCULOSKELETAL:   [ ]Normal   [x ]Weakness  [ ]Bed/Wheelchair bound [ ]Edema  NEUROLOGIC:   [x ]No focal deficits  [ ] Cognitive impairment  [ ] Dysphagia [ ]Dysarthria [ ] Paresis [ ]Other   SKIN:   [x ]Normal   [ ]Pressure ulcer(s)  [ ]Rash    CRITICAL CARE:  [ ] Shock Present  [ ]Septic [ ]Cardiogenic [ ]Neurologic [ ]Hypovolemic  [ ]  Vasopressors [ ]  Inotropes   [ ] Respiratory failure present  [ ] Acute  [ ] Chronic [ ] Hypoxic  [ ] Hypercarbic [ ] Other  [ ] Other organ failure     LABS:                        12.7   12.02 )-----------( 222      ( 16 Sep 2019 06:36 )             36.9   09-16    132<L>  |  96<L>  |  19  ----------------------------<  174<H>  2.9<LL>   |  19<L>  |  0.62    Ca    8.5      16 Sep 2019 06:36          RADIOLOGY & ADDITIONAL STUDIES:    Protein Calorie Malnutrition Present: [ ] yes [ ] no  [ ] PPSV2 < or = 30%  [ ] significant weight loss [ ] poor nutritional intake [ ] anasarca [ ] catabolic state Albumin, Serum: 3.9 g/dL (09-13-19 @ 08:10)      REFERRALS:   [ ]Chaplaincy  [ ] Hospice  [ ]Child Life  [ ]Social Work  [ ]Case management [ ]Holistic Therapy   Goals of Care Document: INTERVAL HPI/OVERNIGHT EVENTS:    Patient resting in bed with his son in law at the bedside. Patients states that nausea has improved sligghtly, but continues to have vertigo.     Code Status: Full code  Allergies    No Known Allergies    Intolerances    MEDICATIONS  (STANDING):  dexamethasone     Tablet 4 milliGRAM(s) Oral every 6 hours  enoxaparin Injectable 40 milliGRAM(s) SubCutaneous daily  levETIRAcetam 500 milliGRAM(s) Oral two times a day  LORazepam   Injectable 0.25 milliGRAM(s) IV Push every 6 hours  nystatin    Suspension 403575 Unit(s) Oral four times a day  pantoprazole    Tablet 40 milliGRAM(s) Oral before breakfast  potassium chloride    Tablet ER 20 milliEquivalent(s) Oral every 2 hours  QUEtiapine 25 milliGRAM(s) Oral at bedtime    MEDICATIONS  (PRN):  acetaminophen   Tablet .. 650 milliGRAM(s) Oral every 6 hours PRN Mild Pain (1 - 3)  HYDROmorphone  Injectable 0.5 milliGRAM(s) IV Push every 4 hours PRN Severe Pain (7 - 10)  LORazepam   Injectable 0.25 milliGRAM(s) IV Push every 4 hours PRN Nausea and/or Vomiting  meclizine 25 milliGRAM(s) Oral two times a day PRN Dizziness  melatonin 3 milliGRAM(s) Oral at bedtime PRN Insomnia  ondansetron    Tablet 4 milliGRAM(s) Oral every 6 hours PRN Nausea and/or Vomiting  polyethylene glycol 3350 17 Gram(s) Oral two times a day PRN Constipation    PRESENT SYMPTOMS: [ ]Unable to obtain due to poor mentation   Source if other than patient:  [ ]Family   [ ]Team     Pain (Impact on QOL):  No pain  Location:  Severity:  Minimal acceptable level (0-10 scale):       Quality:       Onset:  Duration:  Aggravating factors:  Relieving Factors  Radiation:    Dyspnea:  Yes [ ] No [x ] - [ ]Mild [ ]Moderate [ ]Severe  Anxiety:    Yes [ ] No [x ] - [ ]Mild [ ]Moderate [ ]Severe  Fatigue:    Yes [x ] No [ ] - [ ]Mild [ x]Moderate [ ]Severe  Nausea:    Yes [x ] No [ ] - [x ]Mild [ ]Moderate [ ]Severe                         Loss of appetite: Yes [ ] No [ x] - [ ]Mild [ ]Moderate [ ]Severe             Constipation:  Yes [ ] No [x ] - [ ]Mild [ ]Moderate [ ]Severe  Grief:  Yes [ ] No [x ]     PAIN AD Score:	  http://geriatrictoolkit.[email or web address]/cog/painad.pdf (Ctrl + left click to view)    Other Symptoms:  [ ]All other review of systems negative     Karnofsky Performance Score/Palliative Performance Status Version 2:       60  %    http://palliative.info/resource_material/[email or web address].pdf    PHYSICAL EXAM:  Vital Signs Last 24 Hrs  T(C): 36.8 (16 Sep 2019 05:49), Max: 36.8 (16 Sep 2019 05:49)  T(F): 98.2 (16 Sep 2019 05:49), Max: 98.2 (16 Sep 2019 05:49)  HR: 96 (16 Sep 2019 05:49) (88 - 96)  BP: 108/69 (16 Sep 2019 05:49) (106/69 - 127/67)  BP(mean): --  RR: 18 (16 Sep 2019 05:49) (16 - 18)  SpO2: 99% (16 Sep 2019 05:49) (99% - 100%) I&O's Summary    15 Sep 2019 07:01  -  16 Sep 2019 07:00  --------------------------------------------------------  IN: 340 mL / OUT: 550 mL / NET: -210 mL    GENERAL:  [x ]Alert  [x ]Oriented x 3   [ ]Lethargic  [ ]Cachexia  [ ]Unarousable  [x ]Verbal  [ ]Non-Verbal  Behavioral:   [ ] Anxiety  [ ] Delirium [ ] Agitation [ ] Other  HEENT:  [x ]Normal   [ ]Dry mouth   [ ]ET Tube/Trach  [ ]Oral lesions  PULMONARY:   [x ]Clear  [ ]Tachypnea  [ ]Audible excessive secretions   [ ]Rhonchi        [ ]Right [ ]Left [ ]Bilateral  [ ]Crackles        [ ]Right [ ]Left [ ]Bilateral  [ ]Wheezing     [ ]Right [ ]Left [ ]Bilateral  CARDIOVASCULAR:    [x ]Regular [ ]Irregular [ ]Tachy  [ ]Bryson [ ]Murmur [ ]Other  GASTROINTESTINAL:  [x ]Soft  [ ]Distended   [x ]+BS  [x ]Non tender [ ]Tender  [ ]PEG [ ]OGT/ NGT  Last BM:  GENITOURINARY:  [x ]Normal [ ] Incontinent   [ ]Oliguria/Anuria   [ ]Jimenez  MUSCULOSKELETAL:   [ ]Normal   [x ]Weakness  [ ]Bed/Wheelchair bound [ ]Edema  NEUROLOGIC:   [x ]No focal deficits  [ ] Cognitive impairment  [ ] Dysphagia [ ]Dysarthria [ ] Paresis [ ]Other   SKIN:   [x ]Normal   [ ]Pressure ulcer(s)  [ ]Rash    CRITICAL CARE:  [ ] Shock Present  [ ]Septic [ ]Cardiogenic [ ]Neurologic [ ]Hypovolemic  [ ]  Vasopressors [ ]  Inotropes   [ ] Respiratory failure present  [ ] Acute  [ ] Chronic [ ] Hypoxic  [ ] Hypercarbic [ ] Other  [ ] Other organ failure     LABS:                        12.7   12.02 )-----------( 222      ( 16 Sep 2019 06:36 )             36.9   09-16    132<L>  |  96<L>  |  19  ----------------------------<  174<H>  2.9<LL>   |  19<L>  |  0.62    Ca    8.5      16 Sep 2019 06:36      RADIOLOGY & ADDITIONAL STUDIES:    Protein Calorie Malnutrition Present: [ ] yes [ ] no  [ ] PPSV2 < or = 30%  [ ] significant weight loss [ ] poor nutritional intake [ ] anasarca [ ] catabolic state Albumin, Serum: 3.9 g/dL (09-13-19 @ 08:10)      REFERRALS:   [ ]Chaplaincy  [ ] Hospice  [ ]Child Life  [ ]Social Work  [ ]Case management [ ]Holistic Therapy   Goals of Care Document: INTERVAL HPI/OVERNIGHT EVENTS:  Patient resting in bed with his son in law at the bedside. Patients states that nausea has improved sligghtly, but continues to have vertigo.     Code Status: Full code  Allergies    No Known Allergies    Intolerances    MEDICATIONS  (STANDING):  dexamethasone     Tablet 4 milliGRAM(s) Oral every 6 hours  enoxaparin Injectable 40 milliGRAM(s) SubCutaneous daily  levETIRAcetam 500 milliGRAM(s) Oral two times a day  LORazepam   Injectable 0.25 milliGRAM(s) IV Push every 6 hours  nystatin    Suspension 802296 Unit(s) Oral four times a day  pantoprazole    Tablet 40 milliGRAM(s) Oral before breakfast  potassium chloride    Tablet ER 20 milliEquivalent(s) Oral every 2 hours  QUEtiapine 25 milliGRAM(s) Oral at bedtime    MEDICATIONS  (PRN):  acetaminophen   Tablet .. 650 milliGRAM(s) Oral every 6 hours PRN Mild Pain (1 - 3)  HYDROmorphone  Injectable 0.5 milliGRAM(s) IV Push every 4 hours PRN Severe Pain (7 - 10)  LORazepam   Injectable 0.25 milliGRAM(s) IV Push every 4 hours PRN Nausea and/or Vomiting  meclizine 25 milliGRAM(s) Oral two times a day PRN Dizziness  melatonin 3 milliGRAM(s) Oral at bedtime PRN Insomnia  ondansetron    Tablet 4 milliGRAM(s) Oral every 6 hours PRN Nausea and/or Vomiting  polyethylene glycol 3350 17 Gram(s) Oral two times a day PRN Constipation    PRESENT SYMPTOMS: [ ]Unable to obtain due to poor mentation   Source if other than patient:  [ ]Family   [ ]Team     Pain (Impact on QOL):  No pain  Location:  Severity:  Minimal acceptable level (0-10 scale):       Quality:       Onset:  Duration:  Aggravating factors:  Relieving Factors  Radiation:    Dyspnea:  Yes [ ] No [x ] - [ ]Mild [ ]Moderate [ ]Severe  Anxiety:    Yes [ ] No [x ] - [ ]Mild [ ]Moderate [ ]Severe  Fatigue:    Yes [x ] No [ ] - [ ]Mild [ x]Moderate [ ]Severe  Nausea:    Yes [x ] No [ ] - [x ]Mild [ ]Moderate [ ]Severe                         Loss of appetite: Yes [ ] No [ x] - [ ]Mild [ ]Moderate [ ]Severe             Constipation:  Yes [ ] No [x ] - [ ]Mild [ ]Moderate [ ]Severe  Grief:  Yes [ ] No [x ]     PAIN AD Score:	  http://[email or web address]/cog/painad.pdf (Ctrl + left click to view)    Other Symptoms: +vertigo  [ x]All other review of systems negative     Karnofsky Performance Score/Palliative Performance Status Version 2:       60  %    http://palliative.info/resource_material/[email or web address].pdf    PHYSICAL EXAM:  Vital Signs Last 24 Hrs  T(C): 36.8 (16 Sep 2019 05:49), Max: 36.8 (16 Sep 2019 05:49)  T(F): 98.2 (16 Sep 2019 05:49), Max: 98.2 (16 Sep 2019 05:49)  HR: 96 (16 Sep 2019 05:49) (88 - 96)  BP: 108/69 (16 Sep 2019 05:49) (106/69 - 127/67)  BP(mean): --  RR: 18 (16 Sep 2019 05:49) (16 - 18)  SpO2: 99% (16 Sep 2019 05:49) (99% - 100%) I&O's Summary    15 Sep 2019 07:01  -  16 Sep 2019 07:00  --------------------------------------------------------  IN: 340 mL / OUT: 550 mL / NET: -210 mL    GENERAL:  [x ]Alert  [x ]Oriented x 3   [ ]Lethargic  [ ]Cachexia  [ ]Unarousable  [x ]Verbal  [ ]Non-Verbal  Behavioral:   [ ] Anxiety  [ ] Delirium [ ] Agitation [ ] Other  HEENT:  [x ]Normal   [ ]Dry mouth   [ ]ET Tube/Trach  [ ]Oral lesions  PULMONARY:   [x ]Clear  [ ]Tachypnea  [ ]Audible excessive secretions   [ ]Rhonchi        [ ]Right [ ]Left [ ]Bilateral  [ ]Crackles        [ ]Right [ ]Left [ ]Bilateral  [ ]Wheezing     [ ]Right [ ]Left [ ]Bilateral  CARDIOVASCULAR:    [x ]Regular [ ]Irregular [ ]Tachy  [ ]Bryson [ ]Murmur [ ]Other  GASTROINTESTINAL:  [x ]Soft  [ ]Distended   [x ]+BS  [x ]Non tender [ ]Tender  [ ]PEG [ ]OGT/ NGT  Last BM: 9/13  GENITOURINARY:  [x ]Normal [ ] Incontinent   [ ]Oliguria/Anuria   [ ]Jimenez  MUSCULOSKELETAL:   [ ]Normal   [x ]Weakness  [ ]Bed/Wheelchair bound [ ]Edema  NEUROLOGIC:   [x ]No focal deficits  [ ] Cognitive impairment  [ ] Dysphagia [ ]Dysarthria [ ] Paresis [ ]Other   SKIN:   [x ]Normal   [ ]Pressure ulcer(s)  [ ]Rash    CRITICAL CARE:  [ ] Shock Present  [ ]Septic [ ]Cardiogenic [ ]Neurologic [ ]Hypovolemic  [ ]  Vasopressors [ ]  Inotropes   [ ] Respiratory failure present  [ ] Acute  [ ] Chronic [ ] Hypoxic  [ ] Hypercarbic [ ] Other  [ ] Other organ failure     LABS:                        12.7   12.02 )-----------( 222      ( 16 Sep 2019 06:36 )             36.9   09-16    132<L>  |  96<L>  |  19  ----------------------------<  174<H>  2.9<LL>   |  19<L>  |  0.62    Ca    8.5      16 Sep 2019 06:36    RADIOLOGY & ADDITIONAL STUDIES: reviewed.     Protein Calorie Malnutrition Present: [ ] yes [ ] no  [ ] PPSV2 < or = 30%  [ ] significant weight loss [ ] poor nutritional intake [ ] anasarca [ ] catabolic state Albumin, Serum: 3.9 g/dL (09-13-19 @ 08:10)    REFERRALS:   [ ]Chaplaincy  [ ] Hospice  [ ]Child Life  [ ]Social Work  [ ]Case management [ ]Holistic Therapy   Goals of Care Document:

## 2019-09-16 NOTE — PROGRESS NOTE ADULT - ASSESSMENT
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 CT H redemonstrates numerous metastases without significant interval change with no evidence of hydrocephalus. Patient is planned for inpatient RT starting today

## 2019-09-16 NOTE — PROGRESS NOTE ADULT - PROBLEM SELECTOR PLAN 6
Continued conversation with patient that was started on Monday regarding his overall goals of care. He has good insight about his poor prognosis. He stated that we would want to have a natural death when his time comes and does not want to be on any mechanical ventilation. A MOLST was discussed and he marked off DNR/DNI/Limited medical interventions/No feeding tube/ trial of IV fluids/ Use antibiotics. Emotional support was provided. Continued conversation with patient that was started on Friday regarding his overall goals of care. He has good insight about his poor prognosis. He stated that he would want to have a natural death when his time comes and does not want to be on any mechanical ventilation. A MOLST was discussed and he marked off DNR / DNI / Limited medical interventions / No feeding tube/ trial of IV fluids/ Use antibiotics. Emotional support was provided.

## 2019-09-16 NOTE — PROGRESS NOTE ADULT - PROBLEM SELECTOR PLAN 4
Continue Dexamethasone q6h as ordered by primary team. Patient to start RT to brain today. Appreciate neurosurgery involvement.

## 2019-09-16 NOTE — PROGRESS NOTE ADULT - SUBJECTIVE AND OBJECTIVE BOX
Alexa Stringer MD  PGY-1  Pager [phone]        Patient is a 68y old  Male who presents with a chief complaint of blurry vision, HA, vertigo (16 Sep 2019 10:53)        SUBJECTIVE / OVERNIGHT EVENTS: Patient had no acute events overnight. Patient seen and examined at bedside this morning. His nausea has improved and has been eating soft foods. Patient would like to have PT to get out of bed and stretch.    ROS: [ - ] Fever [ - ] Chills [ - ] Nausea/Vomiting [ - ] Chest Pain [ - ] Shortness of breath     MEDICATIONS  (STANDING):  dexamethasone     Tablet 4 milliGRAM(s) Oral every 6 hours  enoxaparin Injectable 40 milliGRAM(s) SubCutaneous daily  levETIRAcetam 500 milliGRAM(s) Oral two times a day  LORazepam   Injectable 0.25 milliGRAM(s) IV Push every 6 hours  nystatin    Suspension 166218 Unit(s) Oral four times a day  pantoprazole    Tablet 40 milliGRAM(s) Oral before breakfast  QUEtiapine 25 milliGRAM(s) Oral at bedtime    MEDICATIONS  (PRN):  acetaminophen   Tablet .. 650 milliGRAM(s) Oral every 6 hours PRN Mild Pain (1 - 3)  HYDROmorphone  Injectable 0.5 milliGRAM(s) IV Push every 4 hours PRN Severe Pain (7 - 10)  LORazepam   Injectable 0.25 milliGRAM(s) IV Push every 4 hours PRN Nausea and/or Vomiting  meclizine 25 milliGRAM(s) Oral two times a day PRN Dizziness  melatonin 3 milliGRAM(s) Oral at bedtime PRN Insomnia  ondansetron    Tablet 4 milliGRAM(s) Oral every 6 hours PRN Nausea and/or Vomiting  polyethylene glycol 3350 17 Gram(s) Oral two times a day PRN Constipation      Vital Signs Last 24 Hrs  T(C): 36.8 (16 Sep 2019 05:49), Max: 36.8 (16 Sep 2019 05:49)  T(F): 98.2 (16 Sep 2019 05:49), Max: 98.2 (16 Sep 2019 05:49)  HR: 96 (16 Sep 2019 05:49) (88 - 96)  BP: 108/69 (16 Sep 2019 05:49) (106/69 - 127/67)  BP(mean): --  RR: 18 (16 Sep 2019 05:49) (16 - 18)  SpO2: 99% (16 Sep 2019 05:49) (99% - 100%)  CAPILLARY BLOOD GLUCOSE        I&O's Summary    15 Sep 2019 07:01  -  16 Sep 2019 07:00  --------------------------------------------------------  IN: 340 mL / OUT: 550 mL / NET: -210 mL        PHYSICAL EXAM  GENERAL: NAD, lying comfortably in bed   Head:  Atraumatic, Normocephalic  CHEST/LUNG: Clear to auscultation bilaterally; No wheeze  HEART: RRR, S1 and S2 No murmurs, rubs, or gallops  ABDOMEN: Soft, Nontender, Nondistended; Bowel sounds present  EXTREMITIES:  2+ Peripheral Pulses, No clubbing, cyanosis, or edema  NEURO: AAOx3, non-focal  SKIN: No rashes or lesions    LABS:                        12.7   12.02 )-----------( 222      ( 16 Sep 2019 06:36 )             36.9     09-16    132<L>  |  96<L>  |  19  ----------------------------<  174<H>  2.9<LL>   |  19<L>  |  0.62    Ca    8.5      16 Sep 2019 06:36                  RADIOLOGY & ADDITIONAL TESTS:    Imaging Personally Reviewed:  Consultant(s) Notes Reviewed:

## 2019-09-16 NOTE — GOALS OF CARE CONVERSATION - PERSONAL ADVANCE DIRECTIVE - CONVERSATION DETAILS
Discussion had with patient, he has good insight about his poor prognosis. He stated that we would want to have a natural death when his time comes and does not want to be on any mechanical ventilation. A MOLST was discussed and he marked off DNR/DNI/Limited medical interventions/No feeding tube/ trial of IV fluids/ Use antibiotics. Emotional support was provided. He is hopeful that the RT he is receiving will help with his Vertigo and Nausea. Discussion had with patient, he has good insight about his poor prognosis. He stated that he would want to have a natural death when his time comes and does not want to be on any mechanical ventilation. A MOLST was discussed and he marked off DNR/DNI/Limited medical interventions/No feeding tube/ trial of IV fluids/ Use antibiotics. Emotional support was provided. He is hopeful that the RT he is receiving will help with his Vertigo and Nausea.

## 2019-09-17 LAB
ALBUMIN SERPL ELPH-MCNC: 3.7 G/DL — SIGNIFICANT CHANGE UP (ref 3.3–5)
ALP SERPL-CCNC: 108 U/L — SIGNIFICANT CHANGE UP (ref 40–120)
ALT FLD-CCNC: 154 U/L — HIGH (ref 4–41)
ANION GAP SERPL CALC-SCNC: 13 MMO/L — SIGNIFICANT CHANGE UP (ref 7–14)
AST SERPL-CCNC: 23 U/L — SIGNIFICANT CHANGE UP (ref 4–40)
BILIRUB SERPL-MCNC: 0.4 MG/DL — SIGNIFICANT CHANGE UP (ref 0.2–1.2)
BUN SERPL-MCNC: 25 MG/DL — HIGH (ref 7–23)
CALCIUM SERPL-MCNC: 8.8 MG/DL — SIGNIFICANT CHANGE UP (ref 8.4–10.5)
CHLORIDE SERPL-SCNC: 92 MMOL/L — LOW (ref 98–107)
CO2 SERPL-SCNC: 23 MMOL/L — SIGNIFICANT CHANGE UP (ref 22–31)
CREAT SERPL-MCNC: 0.59 MG/DL — SIGNIFICANT CHANGE UP (ref 0.5–1.3)
GLUCOSE SERPL-MCNC: 117 MG/DL — HIGH (ref 70–99)
HCT VFR BLD CALC: 40.4 % — SIGNIFICANT CHANGE UP (ref 39–50)
HGB BLD-MCNC: 13.4 G/DL — SIGNIFICANT CHANGE UP (ref 13–17)
MAGNESIUM SERPL-MCNC: 2.3 MG/DL — SIGNIFICANT CHANGE UP (ref 1.6–2.6)
MCHC RBC-ENTMCNC: 28.4 PG — SIGNIFICANT CHANGE UP (ref 27–34)
MCHC RBC-ENTMCNC: 33.2 % — SIGNIFICANT CHANGE UP (ref 32–36)
MCV RBC AUTO: 85.6 FL — SIGNIFICANT CHANGE UP (ref 80–100)
NRBC # FLD: 0 K/UL — SIGNIFICANT CHANGE UP (ref 0–0)
PHOSPHATE SERPL-MCNC: 3.4 MG/DL — SIGNIFICANT CHANGE UP (ref 2.5–4.5)
PLATELET # BLD AUTO: 231 K/UL — SIGNIFICANT CHANGE UP (ref 150–400)
PMV BLD: 9.4 FL — SIGNIFICANT CHANGE UP (ref 7–13)
POTASSIUM SERPL-MCNC: 4.5 MMOL/L — SIGNIFICANT CHANGE UP (ref 3.5–5.3)
POTASSIUM SERPL-SCNC: 4.5 MMOL/L — SIGNIFICANT CHANGE UP (ref 3.5–5.3)
PROT SERPL-MCNC: 6.2 G/DL — SIGNIFICANT CHANGE UP (ref 6–8.3)
RBC # BLD: 4.72 M/UL — SIGNIFICANT CHANGE UP (ref 4.2–5.8)
RBC # FLD: 12.4 % — SIGNIFICANT CHANGE UP (ref 10.3–14.5)
SODIUM SERPL-SCNC: 128 MMOL/L — LOW (ref 135–145)
WBC # BLD: 14.77 K/UL — HIGH (ref 3.8–10.5)
WBC # FLD AUTO: 14.77 K/UL — HIGH (ref 3.8–10.5)

## 2019-09-17 PROCEDURE — 99233 SBSQ HOSP IP/OBS HIGH 50: CPT | Mod: GC

## 2019-09-17 RX ORDER — ENOXAPARIN SODIUM 100 MG/ML
40 INJECTION SUBCUTANEOUS DAILY
Refills: 0 | Status: DISCONTINUED | OUTPATIENT
Start: 2019-09-17 | End: 2019-09-17

## 2019-09-17 RX ADMIN — Medication 500000 UNIT(S): at 17:29

## 2019-09-17 RX ADMIN — QUETIAPINE FUMARATE 25 MILLIGRAM(S): 200 TABLET, FILM COATED ORAL at 22:22

## 2019-09-17 RX ADMIN — ENOXAPARIN SODIUM 40 MILLIGRAM(S): 100 INJECTION SUBCUTANEOUS at 13:19

## 2019-09-17 RX ADMIN — Medication 4 MILLIGRAM(S): at 17:29

## 2019-09-17 RX ADMIN — LEVETIRACETAM 500 MILLIGRAM(S): 250 TABLET, FILM COATED ORAL at 06:25

## 2019-09-17 RX ADMIN — Medication 0.25 MILLIGRAM(S): at 06:25

## 2019-09-17 RX ADMIN — Medication 0.25 MILLIGRAM(S): at 13:19

## 2019-09-17 RX ADMIN — LEVETIRACETAM 500 MILLIGRAM(S): 250 TABLET, FILM COATED ORAL at 17:29

## 2019-09-17 RX ADMIN — PANTOPRAZOLE SODIUM 40 MILLIGRAM(S): 20 TABLET, DELAYED RELEASE ORAL at 06:25

## 2019-09-17 RX ADMIN — Medication 4 MILLIGRAM(S): at 06:25

## 2019-09-17 RX ADMIN — Medication 500000 UNIT(S): at 13:19

## 2019-09-17 RX ADMIN — Medication 4 MILLIGRAM(S): at 13:19

## 2019-09-17 RX ADMIN — Medication 0.25 MILLIGRAM(S): at 17:29

## 2019-09-17 RX ADMIN — Medication 500000 UNIT(S): at 06:25

## 2019-09-17 NOTE — PROGRESS NOTE ADULT - ASSESSMENT
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 CT H redemonstrates numerous metastases without significant interval change with no evidence of hydrocephalus. Patient recieving RT inpatient.

## 2019-09-17 NOTE — PROGRESS NOTE ADULT - PROBLEM SELECTOR PLAN 1
2/2 NSCLC  -CTH redemonstrated multiple intracranial metastases. No hydrocephalus.  - Neurosurgery consulted: not candidate for surgery  - oncology, rad onc and palliative care recs appreciated  - c/w decadron 4 mg q6 hr. Uptrending wbc 2/2 decadron, afebrile.  - c/w Keppra 500 mg bid for seizure prophylaxis  - c/w zofran and meclizine for n/v and vertigo management  - dilaudid 0.5mg q4h for HA  - fall precautions  - neuro checks  -radiation therapy treatment 1 (total 10)

## 2019-09-17 NOTE — PHYSICAL THERAPY INITIAL EVALUATION ADULT - DISCHARGE DISPOSITION, PT EVAL
Home w/ Home PT to address current functional limitations and impairments and to optimize safety within home environment

## 2019-09-17 NOTE — PHYSICAL THERAPY INITIAL EVALUATION ADULT - GENERAL OBSERVATIONS, REHAB EVAL
Pt seen lying in bed, agreed to PT evaluation. Pt does endorse c/o dizziness but feels like he is able to ambulate.

## 2019-09-17 NOTE — PHYSICAL THERAPY INITIAL EVALUATION ADULT - PERTINENT HX OF CURRENT PROBLEM, REHAB EVAL
69 y/o male w/ PMHx recently diagnosed NSCLC with mets to brain, presenting with worsening generalized HA, blurry vision, and vertigo since 9/9. CT Head redemonstrates numerous metastases without significant interval change with no evidence of hydrocephalus. Pt awaiting oncology and radiation oncology evaluation for inpatient RT.

## 2019-09-17 NOTE — PROGRESS NOTE ADULT - PROBLEM SELECTOR PLAN 5
DVT ppx: Lovenox 40 mg qd  Diet: Clear liquid w/ ensurex3  DNR/DNI DVT ppx: Lovenox 40 mg qd  Diet: Clear liquid w/ ensurex3  Dispo: PT rec home PT  DNR/DNI

## 2019-09-17 NOTE — PROGRESS NOTE ADULT - SUBJECTIVE AND OBJECTIVE BOX
Alexa Stringer MD  PGY-1  Pager [phone]        Patient is a 68y old  Male who presents with a chief complaint of blurry vision, HA, vertigo (16 Sep 2019 14:12)        SUBJECTIVE / OVERNIGHT EVENTS: Patient had no acute events overnight. Patient seen and examined at bedside this morning.     ROS: [ - ] Fever [ - ] Chills [ - ] Nausea/Vomiting [ - ] Chest Pain [ - ] Shortness of breath     MEDICATIONS  (STANDING):  dexamethasone     Tablet 4 milliGRAM(s) Oral every 6 hours  enoxaparin Injectable 40 milliGRAM(s) SubCutaneous daily  levETIRAcetam 500 milliGRAM(s) Oral two times a day  LORazepam   Injectable 0.25 milliGRAM(s) IV Push every 6 hours  nystatin    Suspension 339990 Unit(s) Oral four times a day  pantoprazole    Tablet 40 milliGRAM(s) Oral before breakfast  QUEtiapine 25 milliGRAM(s) Oral at bedtime    MEDICATIONS  (PRN):  acetaminophen   Tablet .. 650 milliGRAM(s) Oral every 6 hours PRN Mild Pain (1 - 3)  HYDROmorphone  Injectable 0.5 milliGRAM(s) IV Push every 4 hours PRN Severe Pain (7 - 10)  LORazepam   Injectable 0.25 milliGRAM(s) IV Push every 4 hours PRN Nausea and/or Vomiting  meclizine 25 milliGRAM(s) Oral two times a day PRN Dizziness  melatonin 3 milliGRAM(s) Oral at bedtime PRN Insomnia  ondansetron    Tablet 4 milliGRAM(s) Oral every 6 hours PRN Nausea and/or Vomiting  polyethylene glycol 3350 17 Gram(s) Oral two times a day PRN Constipation      Vital Signs Last 24 Hrs  T(C): 36.8 (17 Sep 2019 06:27), Max: 36.8 (16 Sep 2019 15:21)  T(F): 98.2 (17 Sep 2019 06:27), Max: 98.3 (16 Sep 2019 15:21)  HR: 86 (17 Sep 2019 06:27) (63 - 98)  BP: 114/69 (17 Sep 2019 06:27) (97/63 - 114/69)  BP(mean): --  RR: 17 (17 Sep 2019 06:27) (17 - 18)  SpO2: 98% (17 Sep 2019 06:27) (97% - 98%)  CAPILLARY BLOOD GLUCOSE        I&O's Summary    15 Sep 2019 07:01  -  16 Sep 2019 07:00  --------------------------------------------------------  IN: 340 mL / OUT: 550 mL / NET: -210 mL    16 Sep 2019 07:01  -  17 Sep 2019 06:54  --------------------------------------------------------  IN: 300 mL / OUT: 750 mL / NET: -450 mL        PHYSICAL EXAM  GENERAL: NAD, lying comfortably in bed   Head:  Atraumatic, Normocephalic  CHEST/LUNG: Clear to auscultation bilaterally; No wheeze  HEART: RRR, S1 and S2 No murmurs, rubs, or gallops  ABDOMEN: Soft, Nontender, Nondistended; Bowel sounds present  EXTREMITIES:  2+ Peripheral Pulses, No clubbing, cyanosis, or edema  NEURO: AAOx3, non-focal  SKIN: No rashes or lesions    LABS:                        12.7   12.02 )-----------( 222      ( 16 Sep 2019 06:36 )             36.9     09-16    132<L>  |  96<L>  |  19  ----------------------------<  174<H>  2.9<LL>   |  19<L>  |  0.62    Ca    8.5      16 Sep 2019 06:36                  RADIOLOGY & ADDITIONAL TESTS:    Imaging Personally Reviewed:  Consultant(s) Notes Reviewed: Alexa Stringer MD  PGY-1  Pager [phone]        Patient is a 68y old  Male who presents with a chief complaint of blurry vision, HA, vertigo (16 Sep 2019 14:12)        SUBJECTIVE / OVERNIGHT EVENTS: Patient had no acute events overnight. Patient seen and examined at bedside this morning. Patient states blurry vision has gotten worse since admission. Weakness unchanged but vertigo improved. Patient tolerating diet. States that he had trouble swallowing and would vomit at home. Last emesis on Sunday    ROS: [ - ] Fever [ - ] Chills [ - ] Nausea/Vomiting [ - ] Chest Pain [ - ] Shortness of breath     MEDICATIONS  (STANDING):  dexamethasone     Tablet 4 milliGRAM(s) Oral every 6 hours  enoxaparin Injectable 40 milliGRAM(s) SubCutaneous daily  levETIRAcetam 500 milliGRAM(s) Oral two times a day  LORazepam   Injectable 0.25 milliGRAM(s) IV Push every 6 hours  nystatin    Suspension 307542 Unit(s) Oral four times a day  pantoprazole    Tablet 40 milliGRAM(s) Oral before breakfast  QUEtiapine 25 milliGRAM(s) Oral at bedtime    MEDICATIONS  (PRN):  acetaminophen   Tablet .. 650 milliGRAM(s) Oral every 6 hours PRN Mild Pain (1 - 3)  HYDROmorphone  Injectable 0.5 milliGRAM(s) IV Push every 4 hours PRN Severe Pain (7 - 10)  LORazepam   Injectable 0.25 milliGRAM(s) IV Push every 4 hours PRN Nausea and/or Vomiting  meclizine 25 milliGRAM(s) Oral two times a day PRN Dizziness  melatonin 3 milliGRAM(s) Oral at bedtime PRN Insomnia  ondansetron    Tablet 4 milliGRAM(s) Oral every 6 hours PRN Nausea and/or Vomiting  polyethylene glycol 3350 17 Gram(s) Oral two times a day PRN Constipation      Vital Signs Last 24 Hrs  T(C): 36.8 (17 Sep 2019 06:27), Max: 36.8 (16 Sep 2019 15:21)  T(F): 98.2 (17 Sep 2019 06:27), Max: 98.3 (16 Sep 2019 15:21)  HR: 86 (17 Sep 2019 06:27) (63 - 98)  BP: 114/69 (17 Sep 2019 06:27) (97/63 - 114/69)  BP(mean): --  RR: 17 (17 Sep 2019 06:27) (17 - 18)  SpO2: 98% (17 Sep 2019 06:27) (97% - 98%)  CAPILLARY BLOOD GLUCOSE        I&O's Summary    15 Sep 2019 07:01  -  16 Sep 2019 07:00  --------------------------------------------------------  IN: 340 mL / OUT: 550 mL / NET: -210 mL    16 Sep 2019 07:01  -  17 Sep 2019 06:54  --------------------------------------------------------  IN: 300 mL / OUT: 750 mL / NET: -450 mL        PHYSICAL EXAM  GENERAL: NAD, sitting in chair, thin  Head:  Atraumatic, Normocephalic  CHEST/LUNG: Clear to auscultation bilaterally; No wheeze  HEART: RRR, S1 and S2 No murmurs, rubs, or gallops  ABDOMEN: Soft, Nontender, Nondistended; Bowel sounds present  EXTREMITIES:  2+ Peripheral Pulses, No clubbing, cyanosis, or edema  NEURO: AAOx3, non-focal  SKIN: No rashes or lesions    LABS:                        12.7   12.02 )-----------( 222      ( 16 Sep 2019 06:36 )             36.9     09-16    132<L>  |  96<L>  |  19  ----------------------------<  174<H>  2.9<LL>   |  19<L>  |  0.62    Ca    8.5      16 Sep 2019 06:36                  RADIOLOGY & ADDITIONAL TESTS:    Imaging Personally Reviewed:  Consultant(s) Notes Reviewed:

## 2019-09-17 NOTE — PROGRESS NOTE ADULT - PROBLEM SELECTOR PLAN 4
Per documentation, pt has lost 25 lbs over a 2 week period.   - nutrition c/s, f/u recs Per documentation, pt has lost 25 lbs over a 2 week period.   - nutrition c/s, f/u recs  -continue clears with ensure

## 2019-09-17 NOTE — PROGRESS NOTE ADULT - PROBLEM SELECTOR PLAN 2
- improving  - S/p gentle fluid restriction 50 cc/hr for 12 hours - S/p gentle fluid restriction 50 cc/hr for 12 hours

## 2019-09-18 ENCOUNTER — APPOINTMENT (OUTPATIENT)
Dept: NEUROLOGY | Facility: CLINIC | Age: 69
End: 2019-09-18

## 2019-09-18 LAB
ALBUMIN SERPL ELPH-MCNC: 3.6 G/DL — SIGNIFICANT CHANGE UP (ref 3.3–5)
ALP SERPL-CCNC: 101 U/L — SIGNIFICANT CHANGE UP (ref 40–120)
ALT FLD-CCNC: 117 U/L — HIGH (ref 4–41)
ANION GAP SERPL CALC-SCNC: 15 MMO/L — HIGH (ref 7–14)
AST SERPL-CCNC: 13 U/L — SIGNIFICANT CHANGE UP (ref 4–40)
BILIRUB SERPL-MCNC: 0.5 MG/DL — SIGNIFICANT CHANGE UP (ref 0.2–1.2)
BUN SERPL-MCNC: 24 MG/DL — HIGH (ref 7–23)
CALCIUM SERPL-MCNC: 8.7 MG/DL — SIGNIFICANT CHANGE UP (ref 8.4–10.5)
CHLORIDE SERPL-SCNC: 92 MMOL/L — LOW (ref 98–107)
CO2 SERPL-SCNC: 22 MMOL/L — SIGNIFICANT CHANGE UP (ref 22–31)
CREAT SERPL-MCNC: 0.62 MG/DL — SIGNIFICANT CHANGE UP (ref 0.5–1.3)
GLUCOSE SERPL-MCNC: 190 MG/DL — HIGH (ref 70–99)
HCT VFR BLD CALC: 40.4 % — SIGNIFICANT CHANGE UP (ref 39–50)
HGB BLD-MCNC: 13.4 G/DL — SIGNIFICANT CHANGE UP (ref 13–17)
MAGNESIUM SERPL-MCNC: 2.2 MG/DL — SIGNIFICANT CHANGE UP (ref 1.6–2.6)
MCHC RBC-ENTMCNC: 28.6 PG — SIGNIFICANT CHANGE UP (ref 27–34)
MCHC RBC-ENTMCNC: 33.2 % — SIGNIFICANT CHANGE UP (ref 32–36)
MCV RBC AUTO: 86.3 FL — SIGNIFICANT CHANGE UP (ref 80–100)
NRBC # FLD: 0 K/UL — SIGNIFICANT CHANGE UP (ref 0–0)
PHOSPHATE SERPL-MCNC: 3.3 MG/DL — SIGNIFICANT CHANGE UP (ref 2.5–4.5)
PLATELET # BLD AUTO: 232 K/UL — SIGNIFICANT CHANGE UP (ref 150–400)
PMV BLD: 9.3 FL — SIGNIFICANT CHANGE UP (ref 7–13)
POTASSIUM SERPL-MCNC: 3.7 MMOL/L — SIGNIFICANT CHANGE UP (ref 3.5–5.3)
POTASSIUM SERPL-SCNC: 3.7 MMOL/L — SIGNIFICANT CHANGE UP (ref 3.5–5.3)
PROT SERPL-MCNC: 6.3 G/DL — SIGNIFICANT CHANGE UP (ref 6–8.3)
RBC # BLD: 4.68 M/UL — SIGNIFICANT CHANGE UP (ref 4.2–5.8)
RBC # FLD: 12.4 % — SIGNIFICANT CHANGE UP (ref 10.3–14.5)
SODIUM SERPL-SCNC: 129 MMOL/L — LOW (ref 135–145)
WBC # BLD: 11.66 K/UL — HIGH (ref 3.8–10.5)
WBC # FLD AUTO: 11.66 K/UL — HIGH (ref 3.8–10.5)

## 2019-09-18 PROCEDURE — 99233 SBSQ HOSP IP/OBS HIGH 50: CPT | Mod: GC

## 2019-09-18 PROCEDURE — 99233 SBSQ HOSP IP/OBS HIGH 50: CPT

## 2019-09-18 RX ADMIN — Medication 4 MILLIGRAM(S): at 05:53

## 2019-09-18 RX ADMIN — ENOXAPARIN SODIUM 40 MILLIGRAM(S): 100 INJECTION SUBCUTANEOUS at 13:49

## 2019-09-18 RX ADMIN — Medication 4 MILLIGRAM(S): at 23:09

## 2019-09-18 RX ADMIN — PANTOPRAZOLE SODIUM 40 MILLIGRAM(S): 20 TABLET, DELAYED RELEASE ORAL at 05:53

## 2019-09-18 RX ADMIN — Medication 500000 UNIT(S): at 05:52

## 2019-09-18 RX ADMIN — LEVETIRACETAM 500 MILLIGRAM(S): 250 TABLET, FILM COATED ORAL at 17:45

## 2019-09-18 RX ADMIN — Medication 4 MILLIGRAM(S): at 17:45

## 2019-09-18 RX ADMIN — Medication 500000 UNIT(S): at 00:39

## 2019-09-18 RX ADMIN — Medication 4 MILLIGRAM(S): at 00:39

## 2019-09-18 RX ADMIN — QUETIAPINE FUMARATE 25 MILLIGRAM(S): 200 TABLET, FILM COATED ORAL at 23:09

## 2019-09-18 RX ADMIN — Medication 0.25 MILLIGRAM(S): at 05:53

## 2019-09-18 RX ADMIN — Medication 500000 UNIT(S): at 13:49

## 2019-09-18 RX ADMIN — Medication 0.25 MILLIGRAM(S): at 17:44

## 2019-09-18 RX ADMIN — LEVETIRACETAM 500 MILLIGRAM(S): 250 TABLET, FILM COATED ORAL at 05:53

## 2019-09-18 RX ADMIN — Medication 0.25 MILLIGRAM(S): at 00:37

## 2019-09-18 RX ADMIN — Medication 500000 UNIT(S): at 17:45

## 2019-09-18 RX ADMIN — Medication 4 MILLIGRAM(S): at 13:49

## 2019-09-18 RX ADMIN — Medication 0.25 MILLIGRAM(S): at 13:49

## 2019-09-18 NOTE — SWALLOW BEDSIDE ASSESSMENT ADULT - SWALLOW EVAL: DIAGNOSIS
1. Functional oral phase for puree consistency, solids, and thin liquids marked by adequate mastication for solids, adequate bolus manipulation and functional oral transit time 2. Functional pharyngeal phase for all consistencies presented marked by adequate laryngeal elevation upon palpation and NO overt s/s of laryngeal penetration/aspiration noted. Patient denies odynophagia and globus sensation with solids at this time.

## 2019-09-18 NOTE — PROGRESS NOTE ADULT - PROBLEM SELECTOR PLAN 4
Per documentation, pt has lost 25 lbs over a 2 week period.   - nutrition c/s, f/u recs  -continue clears with ensure

## 2019-09-18 NOTE — SWALLOW BEDSIDE ASSESSMENT ADULT - ADDITIONAL RECOMMENDATIONS
1. Follow Safe Swallow Guidelines (Alternate liquids/ solids and small sips/bites)  2. Monitor for PO tolerance/intake

## 2019-09-18 NOTE — SWALLOW BEDSIDE ASSESSMENT ADULT - COMMENTS
Patient is a "69 yo w/ PMHx recently diagnosed NSCLC with mets to brain, presenting with worsening generalized HA, blurry vision, and vertigo since 9/9. CT H re-demonstrates numerous metastases without significant interval change with no evidence of hydrocephalus. Patient is planned for inpatient RT on Monday 9/16".     Patient seen upright at bedside with daughter present. [place]  Service offered, though patient preferred that daughter translate. Patient was alert/awake and verbally responsive to simple questions. Patient able to follow simple directions when given prompting. Patient presents with hoarse vocal quality at baseline. Upon questioning, patient's daughter reports that patient underwent vocal polyp removal in 2016 and voice has been hoarse since then. Patient's daughter reports that patient has been tolerating small/ frequent solids without difficulty (sandwich in room upon arrival).

## 2019-09-18 NOTE — PROGRESS NOTE ADULT - PROBLEM SELECTOR PLAN 5
Continued conversation with patient that was started on Friday regarding his overall goals of care. He has good insight about his poor prognosis. He stated that he would want to have a natural death when his time comes and does not want to be on any mechanical ventilation. A MOLST was discussed and he marked off DNR / DNI / Limited medical interventions / No feeding tube/ trial of IV fluids/ Use antibiotics. Emotional support was provided.

## 2019-09-18 NOTE — PROGRESS NOTE ADULT - SUBJECTIVE AND OBJECTIVE BOX
Alexa Stringer MD  PGY-1  Pager [phone]        Patient is a 68y old  Male who presents with a chief complaint of blurry vision, HA, vertigo (17 Sep 2019 06:53)        SUBJECTIVE / OVERNIGHT EVENTS: Patient had no acute events overnight. Patient seen and examined at bedside this morning.     ROS: [ - ] Fever [ - ] Chills [ - ] Nausea/Vomiting [ - ] Chest Pain [ - ] Shortness of breath     MEDICATIONS  (STANDING):  dexamethasone     Tablet 4 milliGRAM(s) Oral every 6 hours  enoxaparin Injectable 40 milliGRAM(s) SubCutaneous daily  levETIRAcetam 500 milliGRAM(s) Oral two times a day  LORazepam   Injectable 0.25 milliGRAM(s) IV Push every 6 hours  nystatin    Suspension 005522 Unit(s) Oral four times a day  pantoprazole    Tablet 40 milliGRAM(s) Oral before breakfast  QUEtiapine 25 milliGRAM(s) Oral at bedtime    MEDICATIONS  (PRN):  acetaminophen   Tablet .. 650 milliGRAM(s) Oral every 6 hours PRN Mild Pain (1 - 3)  HYDROmorphone  Injectable 0.5 milliGRAM(s) IV Push every 4 hours PRN Severe Pain (7 - 10)  LORazepam   Injectable 0.25 milliGRAM(s) IV Push every 4 hours PRN Nausea and/or Vomiting  meclizine 25 milliGRAM(s) Oral two times a day PRN Dizziness  melatonin 3 milliGRAM(s) Oral at bedtime PRN Insomnia  ondansetron    Tablet 4 milliGRAM(s) Oral every 6 hours PRN Nausea and/or Vomiting  polyethylene glycol 3350 17 Gram(s) Oral two times a day PRN Constipation      Vital Signs Last 24 Hrs  T(C): 36.7 (18 Sep 2019 05:42), Max: 37 (17 Sep 2019 21:30)  T(F): 98 (18 Sep 2019 05:42), Max: 98.6 (17 Sep 2019 21:30)  HR: 88 (18 Sep 2019 05:42) (84 - 93)  BP: 111/76 (18 Sep 2019 05:42) (109/71 - 111/76)  BP(mean): --  RR: 18 (18 Sep 2019 05:42) (17 - 18)  SpO2: 100% (18 Sep 2019 05:42) (99% - 100%)  CAPILLARY BLOOD GLUCOSE        I&O's Summary    17 Sep 2019 07:01  -  18 Sep 2019 07:00  --------------------------------------------------------  IN: 0 mL / OUT: 400 mL / NET: -400 mL        PHYSICAL EXAM  GENERAL: NAD, lying comfortably in bed   Head:  Atraumatic, Normocephalic  CHEST/LUNG: Clear to auscultation bilaterally; No wheeze  HEART: RRR, S1 and S2 No murmurs, rubs, or gallops  ABDOMEN: Soft, Nontender, Nondistended; Bowel sounds present  EXTREMITIES:  2+ Peripheral Pulses, No clubbing, cyanosis, or edema  NEURO: AAOx3, non-focal  SKIN: No rashes or lesions    LABS:                        13.4   14.77 )-----------( 231      ( 17 Sep 2019 12:00 )             40.4     09-17    128<L>  |  92<L>  |  25<H>  ----------------------------<  117<H>  4.5   |  23  |  0.59    Ca    8.8      17 Sep 2019 12:00  Phos  3.4     09-17  Mg     2.3     09-17    TPro  6.2  /  Alb  3.7  /  TBili  0.4  /  DBili  x   /  AST  23  /  ALT  154<H>  /  AlkPhos  108  09-17                RADIOLOGY & ADDITIONAL TESTS:    Imaging Personally Reviewed:  Consultant(s) Notes Reviewed:

## 2019-09-18 NOTE — PROGRESS NOTE ADULT - SUBJECTIVE AND OBJECTIVE BOX
INTERVAL HPI/OVERNIGHT EVENTS:  Pt indicates nausea is improved, tolerating PO.  Vertigo is still present.  Plan is to continue with RT.     Code Status: Full code  Allergies    No Known Allergies    Intolerances    MEDICATIONS  (STANDING):  dexamethasone     Tablet 4 milliGRAM(s) Oral every 6 hours  enoxaparin Injectable 40 milliGRAM(s) SubCutaneous daily  levETIRAcetam 500 milliGRAM(s) Oral two times a day  LORazepam   Injectable 0.25 milliGRAM(s) IV Push every 6 hours  nystatin    Suspension 210819 Unit(s) Oral four times a day  pantoprazole    Tablet 40 milliGRAM(s) Oral before breakfast  QUEtiapine 25 milliGRAM(s) Oral at bedtime    MEDICATIONS  (PRN):  acetaminophen   Tablet .. 650 milliGRAM(s) Oral every 6 hours PRN Mild Pain (1 - 3)  HYDROmorphone  Injectable 0.5 milliGRAM(s) IV Push every 4 hours PRN Severe Pain (7 - 10)  LORazepam   Injectable 0.25 milliGRAM(s) IV Push every 4 hours PRN Nausea and/or Vomiting  meclizine 25 milliGRAM(s) Oral two times a day PRN Dizziness  melatonin 3 milliGRAM(s) Oral at bedtime PRN Insomnia  ondansetron    Tablet 4 milliGRAM(s) Oral every 6 hours PRN Nausea and/or Vomiting  polyethylene glycol 3350 17 Gram(s) Oral two times a day PRN Constipation    PRESENT SYMPTOMS: [ ]Unable to obtain due to poor mentation   Source if other than patient:  [ ]Family   [ ]Team     Pain (Impact on QOL):  No pain  Location:  Severity:  Minimal acceptable level (0-10 scale):       Quality:       Onset:  Duration:  Aggravating factors:  Relieving Factors  Radiation:    Dyspnea:  Yes [ ] No [x ] - [ ]Mild [ ]Moderate [ ]Severe  Anxiety:    Yes [ ] No [x ] - [ ]Mild [ ]Moderate [ ]Severe  Fatigue:    Yes [x ] No [ ] - [ ]Mild [ x]Moderate [ ]Severe  Nausea:    Yes [x ] No [ ] - [x ]Mild [ ]Moderate [ ]Severe                         Loss of appetite: Yes [ ] No [ x] - [ ]Mild [ ]Moderate [ ]Severe             Constipation:  Yes [ ] No [x ] - [ ]Mild [ ]Moderate [ ]Severe  Grief:  Yes [ ] No [x ]     PAIN AD Score:	  [email or web address]/painad.[email or web address] (Ctrl + left click to view)    Other Symptoms: +vertigo  [ x]All other review of systems negative     Karnofsky Performance Score/Palliative Performance Status Version 2:       50  %    http://palliative.info/resource_material/[email or web address].pdf    PHYSICAL EXAM:  Vital Signs Last 24 Hrs  T(C): 36.7 (18 Sep 2019 05:42), Max: 37 (17 Sep 2019 21:30)  T(F): 98 (18 Sep 2019 05:42), Max: 98.6 (17 Sep 2019 21:30)  HR: 88 (18 Sep 2019 05:42) (84 - 93)  BP: 111/76 (18 Sep 2019 05:42) (109/71 - 111/76)  BP(mean): --  RR: 18 (18 Sep 2019 05:42) (17 - 18)  SpO2: 100% (18 Sep 2019 05:42) (99% - 100%) I&O's Summary    17 Sep 2019 07:01  -  18 Sep 2019 07:00  --------------------------------------------------------  IN: 0 mL / OUT: 400 mL / NET: -400 mL    GENERAL:  [x ]Alert  [x ]Oriented x 3   [ ]Lethargic  [ ]Cachexia  [ ]Unarousable  [x ]Verbal  [ ]Non-Verbal  Behavioral:   [ ] Anxiety  [ ] Delirium [ ] Agitation [ ] Other  HEENT:  [x ]Normal   [ ]Dry mouth   [ ]ET Tube/Trach  [ ]Oral lesions  PULMONARY:   [x ]Clear  [ ]Tachypnea  [ ]Audible excessive secretions   [ ]Rhonchi        [ ]Right [ ]Left [ ]Bilateral  [ ]Crackles        [ ]Right [ ]Left [ ]Bilateral  [ ]Wheezing     [ ]Right [ ]Left [ ]Bilateral  CARDIOVASCULAR:    [x ]Regular [ ]Irregular [ ]Tachy  [ ]Bryson [ ]Murmur [ ]Other  GASTROINTESTINAL:  [x ]Soft  [ ]Distended   [x ]+BS  [x ]Non tender [ ]Tender  [ ]PEG [ ]OGT/ NGT    GENITOURINARY:  [x ]Normal [ ] Incontinent   [ ]Oliguria/Anuria   [ ]Jimenez  MUSCULOSKELETAL:   [ ]Normal   [x ]Weakness  [ ]Bed/Wheelchair bound [ ]Edema  NEUROLOGIC:   [x ]No focal deficits  [ ] Cognitive impairment  [ ] Dysphagia [ ]Dysarthria [ ] Paresis [ ]Other   SKIN:   [x ]Normal   [ ]Pressure ulcer(s)  [ ]Rash    CRITICAL CARE:  [ ] Shock Present  [ ]Septic [ ]Cardiogenic [ ]Neurologic [ ]Hypovolemic  [ ]  Vasopressors [ ]  Inotropes   [ ] Respiratory failure present  [ ] Acute  [ ] Chronic [ ] Hypoxic  [ ] Hypercarbic [ ] Other  [ ] Other organ failure     LABS:                        13.4   11.66 )-----------( 232      ( 18 Sep 2019 10:24 )             40.4     09-18    129<L>  |  92<L>  |  24<H>  ----------------------------<  190<H>  3.7   |  22  |  0.62    Ca    8.7      18 Sep 2019 10:24  Phos  3.3     09-18  Mg     2.2     09-18    TPro  6.3  /  Alb  3.6  /  TBili  0.5  /  DBili  x   /  AST  13  /  ALT  117<H>  /  AlkPhos  101  09-18    RADIOLOGY & ADDITIONAL STUDIES: reviewed.     Protein Calorie Malnutrition Present: [ ] yes [ ] no  [ ] PPSV2 < or = 30%  [ ] significant weight loss [ ] poor nutritional intake [ ] anasarca [ ] catabolic state Albumin, Serum: 3.9 g/dL (09-13-19 @ 08:10)    REFERRALS:   [ ]Chaplaincy  [ ] Hospice  [ ]Child Life  [ ]Social Work  [ ]Case management [ ]Holistic Therapy   Goals of Care Document:

## 2019-09-19 PROCEDURE — 99233 SBSQ HOSP IP/OBS HIGH 50: CPT | Mod: GC

## 2019-09-19 PROCEDURE — 99232 SBSQ HOSP IP/OBS MODERATE 35: CPT

## 2019-09-19 RX ADMIN — Medication 4 MILLIGRAM(S): at 11:44

## 2019-09-19 RX ADMIN — Medication 0.25 MILLIGRAM(S): at 00:49

## 2019-09-19 RX ADMIN — PANTOPRAZOLE SODIUM 40 MILLIGRAM(S): 20 TABLET, DELAYED RELEASE ORAL at 06:23

## 2019-09-19 RX ADMIN — LEVETIRACETAM 500 MILLIGRAM(S): 250 TABLET, FILM COATED ORAL at 06:22

## 2019-09-19 RX ADMIN — Medication 500000 UNIT(S): at 06:23

## 2019-09-19 RX ADMIN — Medication 0.25 MILLIGRAM(S): at 13:37

## 2019-09-19 RX ADMIN — Medication 4 MILLIGRAM(S): at 06:22

## 2019-09-19 RX ADMIN — Medication 500000 UNIT(S): at 11:44

## 2019-09-19 RX ADMIN — Medication 4 MILLIGRAM(S): at 17:34

## 2019-09-19 RX ADMIN — Medication 0.25 MILLIGRAM(S): at 19:47

## 2019-09-19 RX ADMIN — Medication 0.25 MILLIGRAM(S): at 07:38

## 2019-09-19 RX ADMIN — Medication 500000 UNIT(S): at 17:34

## 2019-09-19 RX ADMIN — ENOXAPARIN SODIUM 40 MILLIGRAM(S): 100 INJECTION SUBCUTANEOUS at 11:44

## 2019-09-19 RX ADMIN — Medication 500000 UNIT(S): at 00:50

## 2019-09-19 RX ADMIN — LEVETIRACETAM 500 MILLIGRAM(S): 250 TABLET, FILM COATED ORAL at 17:34

## 2019-09-19 NOTE — PROGRESS NOTE ADULT - SUBJECTIVE AND OBJECTIVE BOX
INTERVAL HPI/OVERNIGHT EVENTS:  Patient S&E at bedside. No o/n events. Pt undergoing inpatient RT. Reports feeling very fatigued, poor appetite, and still with dizziness upon standing. Remains in bed most of the day. Not in any pain. Daughter at bedside.     MEDICATIONS  (STANDING):  dexamethasone     Tablet 4 milliGRAM(s) Oral every 6 hours  enoxaparin Injectable 40 milliGRAM(s) SubCutaneous daily  levETIRAcetam 500 milliGRAM(s) Oral two times a day  LORazepam   Injectable 0.25 milliGRAM(s) IV Push every 6 hours  nystatin    Suspension 607659 Unit(s) Oral four times a day  pantoprazole    Tablet 40 milliGRAM(s) Oral before breakfast  QUEtiapine 25 milliGRAM(s) Oral at bedtime    MEDICATIONS  (PRN):  acetaminophen   Tablet .. 650 milliGRAM(s) Oral every 6 hours PRN Mild Pain (1 - 3)  HYDROmorphone  Injectable 0.5 milliGRAM(s) IV Push every 4 hours PRN Severe Pain (7 - 10)  LORazepam   Injectable 0.25 milliGRAM(s) IV Push every 4 hours PRN Nausea and/or Vomiting  meclizine 25 milliGRAM(s) Oral two times a day PRN Dizziness  melatonin 3 milliGRAM(s) Oral at bedtime PRN Insomnia  ondansetron    Tablet 4 milliGRAM(s) Oral every 6 hours PRN Nausea and/or Vomiting  polyethylene glycol 3350 17 Gram(s) Oral two times a day PRN Constipation    Allergies    No Known Allergies    Intolerances    VITAL SIGNS:  T(F): 98 (09-19-19 @ 06:17)  HR: 90 (09-19-19 @ 06:17)  BP: 109/72 (09-19-19 @ 06:17)  RR: 18 (09-19-19 @ 06:17)  SpO2: 98% (09-19-19 @ 06:17)  Wt(kg): --    PHYSICAL EXAM:    Constitutional: NAD, appears fatigued  Eyes: EOMI, sclera non-icteric  Neck: supple  Respiratory: CTAB  Cardiovascular: RRR, S1/S2  Gastrointestinal: +BS, soft, NTND  Extremities: no LE edema  Neurological: AAOx3    LABS:                        13.4   11.66 )-----------( 232      ( 18 Sep 2019 10:24 )             40.4     09-18    129<L>  |  92<L>  |  24<H>  ----------------------------<  190<H>  3.7   |  22  |  0.62    Ca    8.7      18 Sep 2019 10:24  Phos  3.3     09-18  Mg     2.2     09-18    TPro  6.3  /  Alb  3.6  /  TBili  0.5  /  DBili  x   /  AST  13  /  ALT  117<H>  /  AlkPhos  101  09-18          RADIOLOGY & ADDITIONAL TESTS:  Studies reviewed.

## 2019-09-19 NOTE — PROGRESS NOTE ADULT - PROBLEM SELECTOR PLAN 4
Per documentation, pt has lost 25 lbs over a 2 week period.   - nutrition consulted, f/u recs  -continue low Na diet with ensure

## 2019-09-19 NOTE — PROGRESS NOTE ADULT - ASSESSMENT
67 yo M former heavy smoker, recent admit for worsening ataxia found to have multiple brain lesions with vasogenic edema and b/l spiculated lung nodules s/p bronchoscopy with biopsy on 8/29, pathology results consistent with NSCLC adenocarcinoma a/w worsening headache, blurry vision, and vertigo undergoing inpatient palliative RT.     1. Metastatic lung adenocarcinoma: with symptomatic brain mets, PDL-1 TPS 0%  - CT chest with b/l spiculated lung nodules s/p bronch/EBUS, CTAP with no mets, MRI brain with multiple intraparenchymal enhancing masses with associated hemorrhage and/or vasogenic edema  - Repeat CT head from 9/12 with no change in intracranial mets, no evidence of hydrocephalus. Appreciate Neurosurgery input.   - on keppra and dexamethasone 4 mg q6h  - Appreciate Rad Onc eval, plan for CT simulation and radiation treatment starting 9/16/19 (30 Gy in 10 fractions) for a total of 10 treatments  - PDL-1 is negative, therefore single agent immunotherapy would not be appropriate in first line setting. Plan per Dr. Boyce was to address intracranial mets first with RT given pt's main symptoms related to brain mets and then to consider palliative chemotherapy (other molecular markers for targeted therapies also still pending).   - Pt's daughter/HCP raised concerns regarding pt's declining functional status and nutrition. Plan is to reassess goals of care in terms of pursuing further systemic treatment/chemotherapy after completion of RT 67 yo M former heavy smoker, recent admit for worsening ataxia found to have multiple brain lesions with vasogenic edema and b/l spiculated lung nodules s/p bronchoscopy with biopsy on 8/29, pathology results consistent with NSCLC adenocarcinoma a/w worsening headache, blurry vision, and vertigo undergoing inpatient palliative RT.     1. Metastatic lung adenocarcinoma: with symptomatic brain mets, PDL-1 TPS 0%  - CT chest with b/l spiculated lung nodules s/p bronch/EBUS, CTAP with no mets, MRI brain with multiple intraparenchymal enhancing masses with associated hemorrhage and/or vasogenic edema  - Repeat CT head from 9/12 with no change in intracranial mets, no evidence of hydrocephalus. Appreciate Neurosurgery input.   - on keppra and dexamethasone 4 mg q6h  - Appreciate Rad Onc eval, getting inpatient radiation treatment starting 9/16/19 (30 Gy in 10 fractions) for a total of 10 treatments  - PDL-1 is negative, therefore single agent immunotherapy would not be appropriate in first line setting. Plan per Dr. Boyce was to address intracranial mets first with RT given pt's main symptoms related to brain mets and then to consider palliative chemotherapy (other molecular markers for targeted therapies also still pending).   - Pt's daughter/HCP raised concerns regarding pt's declining functional status and nutrition. Plan is to reassess goals of care in terms of pursuing further systemic treatment/chemotherapy after completion of RT.

## 2019-09-19 NOTE — PROGRESS NOTE ADULT - PROBLEM SELECTOR PLAN 1
2/2 NSCLC  -CTH redemonstrated multiple intracranial metastases. No hydrocephalus.  - Neurosurgery: not candidate for surgery  - oncology, rad onc and palliative care recs appreciated  - c/w decadron 4 mg q6 hr.  - c/w Keppra 500 mg bid for seizure prophylaxis  - c/w zofran and meclizine for n/v and vertigo management  - dilaudid 0.5mg q4h for HA  - fall precautions  - neuro checks  -radiation therapy treatment 3 (total 10)

## 2019-09-19 NOTE — PROGRESS NOTE ADULT - SUBJECTIVE AND OBJECTIVE BOX
Alexa Stringer MD  PGY-1  Pager [phone]        Patient is a 68y old  Male who presents with a chief complaint of blurry vision, HA, vertigo (18 Sep 2019 12:15)        SUBJECTIVE / OVERNIGHT EVENTS: Patient had no acute events overnight. Patient seen and examined at bedside this morning.     ROS: [ - ] Fever [ - ] Chills [ - ] Nausea/Vomiting [ - ] Chest Pain [ - ] Shortness of breath     MEDICATIONS  (STANDING):  dexamethasone     Tablet 4 milliGRAM(s) Oral every 6 hours  enoxaparin Injectable 40 milliGRAM(s) SubCutaneous daily  levETIRAcetam 500 milliGRAM(s) Oral two times a day  LORazepam   Injectable 0.25 milliGRAM(s) IV Push every 6 hours  nystatin    Suspension 483126 Unit(s) Oral four times a day  pantoprazole    Tablet 40 milliGRAM(s) Oral before breakfast  QUEtiapine 25 milliGRAM(s) Oral at bedtime    MEDICATIONS  (PRN):  acetaminophen   Tablet .. 650 milliGRAM(s) Oral every 6 hours PRN Mild Pain (1 - 3)  HYDROmorphone  Injectable 0.5 milliGRAM(s) IV Push every 4 hours PRN Severe Pain (7 - 10)  LORazepam   Injectable 0.25 milliGRAM(s) IV Push every 4 hours PRN Nausea and/or Vomiting  meclizine 25 milliGRAM(s) Oral two times a day PRN Dizziness  melatonin 3 milliGRAM(s) Oral at bedtime PRN Insomnia  ondansetron    Tablet 4 milliGRAM(s) Oral every 6 hours PRN Nausea and/or Vomiting  polyethylene glycol 3350 17 Gram(s) Oral two times a day PRN Constipation      Vital Signs Last 24 Hrs  T(C): 36.7 (19 Sep 2019 06:17), Max: 36.8 (18 Sep 2019 13:52)  T(F): 98 (19 Sep 2019 06:17), Max: 98.2 (18 Sep 2019 13:52)  HR: 90 (19 Sep 2019 06:17) (80 - 91)  BP: 109/72 (19 Sep 2019 06:17) (109/72 - 111/79)  BP(mean): --  RR: 18 (19 Sep 2019 06:17) (18 - 18)  SpO2: 98% (19 Sep 2019 06:17) (98% - 100%)  CAPILLARY BLOOD GLUCOSE        I&O's Summary    17 Sep 2019 07:01  -  18 Sep 2019 07:00  --------------------------------------------------------  IN: 0 mL / OUT: 400 mL / NET: -400 mL    18 Sep 2019 07:01  -  19 Sep 2019 06:57  --------------------------------------------------------  IN: 0 mL / OUT: 650 mL / NET: -650 mL        PHYSICAL EXAM  GENERAL: NAD, lying comfortably in bed   Head:  Atraumatic, Normocephalic  CHEST/LUNG: Clear to auscultation bilaterally; No wheeze  HEART: RRR, S1 and S2 No murmurs, rubs, or gallops  ABDOMEN: Soft, Nontender, Nondistended; Bowel sounds present  EXTREMITIES:  2+ Peripheral Pulses, No clubbing, cyanosis, or edema  NEURO: AAOx3, non-focal  SKIN: No rashes or lesions    LABS:                        13.4   11.66 )-----------( 232      ( 18 Sep 2019 10:24 )             40.4     09-18    129<L>  |  92<L>  |  24<H>  ----------------------------<  190<H>  3.7   |  22  |  0.62    Ca    8.7      18 Sep 2019 10:24  Phos  3.3     09-18  Mg     2.2     09-18    TPro  6.3  /  Alb  3.6  /  TBili  0.5  /  DBili  x   /  AST  13  /  ALT  117<H>  /  AlkPhos  101  09-18                RADIOLOGY & ADDITIONAL TESTS:    Imaging Personally Reviewed:  Consultant(s) Notes Reviewed: Alexa Stringer MD  PGY-1  Pager [phone]        Patient is a 68y old  Male who presents with a chief complaint of blurry vision, HA, vertigo (18 Sep 2019 12:15)        SUBJECTIVE / OVERNIGHT EVENTS: Patient had no acute events overnight. Patient seen and examined at bedside this morning. Patient is tolerating radiation therapy     ROS: [ - ] Fever [ - ] Chills [ - ] Nausea/Vomiting [ - ] Chest Pain [ - ] Shortness of breath     MEDICATIONS  (STANDING):  dexamethasone     Tablet 4 milliGRAM(s) Oral every 6 hours  enoxaparin Injectable 40 milliGRAM(s) SubCutaneous daily  levETIRAcetam 500 milliGRAM(s) Oral two times a day  LORazepam   Injectable 0.25 milliGRAM(s) IV Push every 6 hours  nystatin    Suspension 444383 Unit(s) Oral four times a day  pantoprazole    Tablet 40 milliGRAM(s) Oral before breakfast  QUEtiapine 25 milliGRAM(s) Oral at bedtime    MEDICATIONS  (PRN):  acetaminophen   Tablet .. 650 milliGRAM(s) Oral every 6 hours PRN Mild Pain (1 - 3)  HYDROmorphone  Injectable 0.5 milliGRAM(s) IV Push every 4 hours PRN Severe Pain (7 - 10)  LORazepam   Injectable 0.25 milliGRAM(s) IV Push every 4 hours PRN Nausea and/or Vomiting  meclizine 25 milliGRAM(s) Oral two times a day PRN Dizziness  melatonin 3 milliGRAM(s) Oral at bedtime PRN Insomnia  ondansetron    Tablet 4 milliGRAM(s) Oral every 6 hours PRN Nausea and/or Vomiting  polyethylene glycol 3350 17 Gram(s) Oral two times a day PRN Constipation      Vital Signs Last 24 Hrs  T(C): 36.7 (19 Sep 2019 06:17), Max: 36.8 (18 Sep 2019 13:52)  T(F): 98 (19 Sep 2019 06:17), Max: 98.2 (18 Sep 2019 13:52)  HR: 90 (19 Sep 2019 06:17) (80 - 91)  BP: 109/72 (19 Sep 2019 06:17) (109/72 - 111/79)  BP(mean): --  RR: 18 (19 Sep 2019 06:17) (18 - 18)  SpO2: 98% (19 Sep 2019 06:17) (98% - 100%)  CAPILLARY BLOOD GLUCOSE        I&O's Summary    17 Sep 2019 07:01  -  18 Sep 2019 07:00  --------------------------------------------------------  IN: 0 mL / OUT: 400 mL / NET: -400 mL    18 Sep 2019 07:01  -  19 Sep 2019 06:57  --------------------------------------------------------  IN: 0 mL / OUT: 650 mL / NET: -650 mL        PHYSICAL EXAM  GENERAL: NAD, lying comfortably in bed   Head:  Atraumatic, Normocephalic  CHEST/LUNG: Clear to auscultation bilaterally; No wheeze  HEART: RRR, S1 and S2 No murmurs, rubs, or gallops  ABDOMEN: Soft, Nontender, Nondistended; Bowel sounds present  EXTREMITIES:  2+ Peripheral Pulses, No clubbing, cyanosis, or edema  NEURO: AAOx3, non-focal  SKIN: No rashes or lesions    LABS:                        13.4   11.66 )-----------( 232      ( 18 Sep 2019 10:24 )             40.4     09-18    129<L>  |  92<L>  |  24<H>  ----------------------------<  190<H>  3.7   |  22  |  0.62    Ca    8.7      18 Sep 2019 10:24  Phos  3.3     09-18  Mg     2.2     09-18    TPro  6.3  /  Alb  3.6  /  TBili  0.5  /  DBili  x   /  AST  13  /  ALT  117<H>  /  AlkPhos  101  09-18                RADIOLOGY & ADDITIONAL TESTS:    Imaging Personally Reviewed:  Consultant(s) Notes Reviewed: Alexa Stringer MD  PGY-1  Pager [phone]        Patient is a 68y old  Male who presents with a chief complaint of blurry vision, HA, vertigo (18 Sep 2019 12:15)        SUBJECTIVE / OVERNIGHT EVENTS: Patient had no acute events overnight. Patient seen and examined at bedside this morning. Patient is tolerating radiation therapy, but has headaches and unchanged blurry vision. No ep of dizziness.    ROS: [ - ] Fever [ - ] Chills [ - ] Nausea/Vomiting [ - ] Chest Pain [ - ] Shortness of breath     MEDICATIONS  (STANDING):  dexamethasone     Tablet 4 milliGRAM(s) Oral every 6 hours  enoxaparin Injectable 40 milliGRAM(s) SubCutaneous daily  levETIRAcetam 500 milliGRAM(s) Oral two times a day  LORazepam   Injectable 0.25 milliGRAM(s) IV Push every 6 hours  nystatin    Suspension 572964 Unit(s) Oral four times a day  pantoprazole    Tablet 40 milliGRAM(s) Oral before breakfast  QUEtiapine 25 milliGRAM(s) Oral at bedtime    MEDICATIONS  (PRN):  acetaminophen   Tablet .. 650 milliGRAM(s) Oral every 6 hours PRN Mild Pain (1 - 3)  HYDROmorphone  Injectable 0.5 milliGRAM(s) IV Push every 4 hours PRN Severe Pain (7 - 10)  LORazepam   Injectable 0.25 milliGRAM(s) IV Push every 4 hours PRN Nausea and/or Vomiting  meclizine 25 milliGRAM(s) Oral two times a day PRN Dizziness  melatonin 3 milliGRAM(s) Oral at bedtime PRN Insomnia  ondansetron    Tablet 4 milliGRAM(s) Oral every 6 hours PRN Nausea and/or Vomiting  polyethylene glycol 3350 17 Gram(s) Oral two times a day PRN Constipation      Vital Signs Last 24 Hrs  T(C): 36.7 (19 Sep 2019 06:17), Max: 36.8 (18 Sep 2019 13:52)  T(F): 98 (19 Sep 2019 06:17), Max: 98.2 (18 Sep 2019 13:52)  HR: 90 (19 Sep 2019 06:17) (80 - 91)  BP: 109/72 (19 Sep 2019 06:17) (109/72 - 111/79)  BP(mean): --  RR: 18 (19 Sep 2019 06:17) (18 - 18)  SpO2: 98% (19 Sep 2019 06:17) (98% - 100%)  CAPILLARY BLOOD GLUCOSE        I&O's Summary    17 Sep 2019 07:01  -  18 Sep 2019 07:00  --------------------------------------------------------  IN: 0 mL / OUT: 400 mL / NET: -400 mL    18 Sep 2019 07:01  -  19 Sep 2019 06:57  --------------------------------------------------------  IN: 0 mL / OUT: 650 mL / NET: -650 mL        PHYSICAL EXAM  GENERAL: NAD, lying comfortably in bed   Head:  Atraumatic, Normocephalic  CHEST/LUNG: Clear to auscultation bilaterally; No wheeze  HEART: RRR, S1 and S2 No murmurs, rubs, or gallops  ABDOMEN: Soft, Nontender, Nondistended; Bowel sounds present  EXTREMITIES:  2+ Peripheral Pulses, No clubbing, cyanosis, or edema  NEURO: AAOx3, non-focal  SKIN: No rashes or lesions    LABS:                        13.4   11.66 )-----------( 232      ( 18 Sep 2019 10:24 )             40.4     09-18    129<L>  |  92<L>  |  24<H>  ----------------------------<  190<H>  3.7   |  22  |  0.62    Ca    8.7      18 Sep 2019 10:24  Phos  3.3     09-18  Mg     2.2     09-18    TPro  6.3  /  Alb  3.6  /  TBili  0.5  /  DBili  x   /  AST  13  /  ALT  117<H>  /  AlkPhos  101  09-18

## 2019-09-19 NOTE — PROGRESS NOTE ADULT - ASSESSMENT
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 CT H redemonstrates numerous metastases without significant interval change with no evidence of hydrocephalus. Patient tolerating RT treatments.

## 2019-09-20 PROCEDURE — 99233 SBSQ HOSP IP/OBS HIGH 50: CPT | Mod: GC

## 2019-09-20 PROCEDURE — 99233 SBSQ HOSP IP/OBS HIGH 50: CPT

## 2019-09-20 RX ORDER — SODIUM CHLORIDE 9 MG/ML
500 INJECTION INTRAMUSCULAR; INTRAVENOUS; SUBCUTANEOUS
Refills: 0 | Status: DISCONTINUED | OUTPATIENT
Start: 2019-09-20 | End: 2019-09-27

## 2019-09-20 RX ORDER — DRONABINOL 2.5 MG
5 CAPSULE ORAL
Refills: 0 | Status: DISCONTINUED | OUTPATIENT
Start: 2019-09-20 | End: 2019-09-26

## 2019-09-20 RX ADMIN — Medication 4 MILLIGRAM(S): at 12:38

## 2019-09-20 RX ADMIN — Medication 4 MILLIGRAM(S): at 17:55

## 2019-09-20 RX ADMIN — Medication 0.25 MILLIGRAM(S): at 00:38

## 2019-09-20 RX ADMIN — QUETIAPINE FUMARATE 25 MILLIGRAM(S): 200 TABLET, FILM COATED ORAL at 21:36

## 2019-09-20 RX ADMIN — QUETIAPINE FUMARATE 25 MILLIGRAM(S): 200 TABLET, FILM COATED ORAL at 00:39

## 2019-09-20 RX ADMIN — SODIUM CHLORIDE 100 MILLILITER(S): 9 INJECTION INTRAMUSCULAR; INTRAVENOUS; SUBCUTANEOUS at 12:37

## 2019-09-20 RX ADMIN — ENOXAPARIN SODIUM 40 MILLIGRAM(S): 100 INJECTION SUBCUTANEOUS at 12:38

## 2019-09-20 RX ADMIN — Medication 500000 UNIT(S): at 12:38

## 2019-09-20 RX ADMIN — Medication 4 MILLIGRAM(S): at 00:40

## 2019-09-20 RX ADMIN — Medication 4 MILLIGRAM(S): at 07:29

## 2019-09-20 RX ADMIN — PANTOPRAZOLE SODIUM 40 MILLIGRAM(S): 20 TABLET, DELAYED RELEASE ORAL at 07:28

## 2019-09-20 RX ADMIN — Medication 500000 UNIT(S): at 07:29

## 2019-09-20 RX ADMIN — Medication 5 MILLIGRAM(S): at 17:53

## 2019-09-20 RX ADMIN — LEVETIRACETAM 500 MILLIGRAM(S): 250 TABLET, FILM COATED ORAL at 07:29

## 2019-09-20 RX ADMIN — Medication 500000 UNIT(S): at 00:40

## 2019-09-20 RX ADMIN — Medication 500000 UNIT(S): at 23:52

## 2019-09-20 RX ADMIN — Medication 0.25 MILLIGRAM(S): at 23:52

## 2019-09-20 RX ADMIN — LEVETIRACETAM 500 MILLIGRAM(S): 250 TABLET, FILM COATED ORAL at 17:55

## 2019-09-20 RX ADMIN — Medication 500000 UNIT(S): at 17:55

## 2019-09-20 RX ADMIN — Medication 4 MILLIGRAM(S): at 23:52

## 2019-09-20 RX ADMIN — Medication 0.25 MILLIGRAM(S): at 07:29

## 2019-09-20 RX ADMIN — Medication 0.25 MILLIGRAM(S): at 17:53

## 2019-09-20 RX ADMIN — Medication 0.25 MILLIGRAM(S): at 12:38

## 2019-09-20 NOTE — PROGRESS NOTE ADULT - SUBJECTIVE AND OBJECTIVE BOX
Alexa Stringer MD  PGY-1  Pager [phone]        Patient is a 68y old  Male who presents with a chief complaint of blurry vision, HA, vertigo (19 Sep 2019 10:45)        SUBJECTIVE / OVERNIGHT EVENTS: Patient had no acute events overnight. Patient seen and examined at bedside this morning.     ROS: [ - ] Fever [ - ] Chills [ - ] Nausea/Vomiting [ - ] Chest Pain [ - ] Shortness of breath     MEDICATIONS  (STANDING):  dexamethasone     Tablet 4 milliGRAM(s) Oral every 6 hours  enoxaparin Injectable 40 milliGRAM(s) SubCutaneous daily  levETIRAcetam 500 milliGRAM(s) Oral two times a day  LORazepam   Injectable 0.25 milliGRAM(s) IV Push every 6 hours  nystatin    Suspension 112360 Unit(s) Oral four times a day  pantoprazole    Tablet 40 milliGRAM(s) Oral before breakfast  QUEtiapine 25 milliGRAM(s) Oral at bedtime    MEDICATIONS  (PRN):  acetaminophen   Tablet .. 650 milliGRAM(s) Oral every 6 hours PRN Mild Pain (1 - 3)  HYDROmorphone  Injectable 0.5 milliGRAM(s) IV Push every 4 hours PRN Severe Pain (7 - 10)  LORazepam   Injectable 0.25 milliGRAM(s) IV Push every 4 hours PRN Nausea and/or Vomiting  meclizine 25 milliGRAM(s) Oral two times a day PRN Dizziness  melatonin 3 milliGRAM(s) Oral at bedtime PRN Insomnia  ondansetron    Tablet 4 milliGRAM(s) Oral every 6 hours PRN Nausea and/or Vomiting  polyethylene glycol 3350 17 Gram(s) Oral two times a day PRN Constipation      Vital Signs Last 24 Hrs  T(C): 36.3 (19 Sep 2019 21:46), Max: 36.3 (19 Sep 2019 14:53)  T(F): 97.3 (19 Sep 2019 21:46), Max: 97.4 (19 Sep 2019 14:53)  HR: 83 (19 Sep 2019 21:46) (83 - 95)  BP: 110/77 (19 Sep 2019 21:46) (108/73 - 110/77)  BP(mean): --  RR: 18 (19 Sep 2019 21:46) (18 - 18)  SpO2: 98% (19 Sep 2019 21:46) (98% - 99%)  CAPILLARY BLOOD GLUCOSE        I&O's Summary    18 Sep 2019 07:01  -  19 Sep 2019 07:00  --------------------------------------------------------  IN: 0 mL / OUT: 650 mL / NET: -650 mL        PHYSICAL EXAM  GENERAL: NAD, lying comfortably in bed   Head:  Atraumatic, Normocephalic  CHEST/LUNG: Clear to auscultation bilaterally; No wheeze  HEART: RRR, S1 and S2 No murmurs, rubs, or gallops  ABDOMEN: Soft, Nontender, Nondistended; Bowel sounds present  EXTREMITIES:  2+ Peripheral Pulses, No clubbing, cyanosis, or edema  NEURO: AAOx3, non-focal  SKIN: No rashes or lesions  LABS:                        13.4   11.66 )-----------( 232      ( 18 Sep 2019 10:24 )             40.4     09-18    129<L>  |  92<L>  |  24<H>  ----------------------------<  190<H>  3.7   |  22  |  0.62    Ca    8.7      18 Sep 2019 10:24  Phos  3.3     09-18  Mg     2.2     09-18    TPro  6.3  /  Alb  3.6  /  TBili  0.5  /  DBili  x   /  AST  13  /  ALT  117<H>  /  AlkPhos  101  09-18                RADIOLOGY & ADDITIONAL TESTS:    Imaging Personally Reviewed:  Consultant(s) Notes Reviewed: Alexa Stringer MD  PGY-1  Pager [phone]        Patient is a 68y old  Male who presents with a chief complaint of blurry vision, HA, vertigo (19 Sep 2019 10:45)        SUBJECTIVE / OVERNIGHT EVENTS: Patient had no acute events overnight. Patient seen and examined at bedside this morning. Continues to endorse leg weakness and unchanged blurry vision with lack of appetite. Willing to try dronabinol     ROS: [ - ] Fever [ - ] Chills [ - ] Nausea/Vomiting [ - ] Chest Pain [ - ] Shortness of breath     MEDICATIONS  (STANDING):  dexamethasone     Tablet 4 milliGRAM(s) Oral every 6 hours  enoxaparin Injectable 40 milliGRAM(s) SubCutaneous daily  levETIRAcetam 500 milliGRAM(s) Oral two times a day  LORazepam   Injectable 0.25 milliGRAM(s) IV Push every 6 hours  nystatin    Suspension 574449 Unit(s) Oral four times a day  pantoprazole    Tablet 40 milliGRAM(s) Oral before breakfast  QUEtiapine 25 milliGRAM(s) Oral at bedtime    MEDICATIONS  (PRN):  acetaminophen   Tablet .. 650 milliGRAM(s) Oral every 6 hours PRN Mild Pain (1 - 3)  HYDROmorphone  Injectable 0.5 milliGRAM(s) IV Push every 4 hours PRN Severe Pain (7 - 10)  LORazepam   Injectable 0.25 milliGRAM(s) IV Push every 4 hours PRN Nausea and/or Vomiting  meclizine 25 milliGRAM(s) Oral two times a day PRN Dizziness  melatonin 3 milliGRAM(s) Oral at bedtime PRN Insomnia  ondansetron    Tablet 4 milliGRAM(s) Oral every 6 hours PRN Nausea and/or Vomiting  polyethylene glycol 3350 17 Gram(s) Oral two times a day PRN Constipation      Vital Signs Last 24 Hrs  T(C): 36.3 (19 Sep 2019 21:46), Max: 36.3 (19 Sep 2019 14:53)  T(F): 97.3 (19 Sep 2019 21:46), Max: 97.4 (19 Sep 2019 14:53)  HR: 83 (19 Sep 2019 21:46) (83 - 95)  BP: 110/77 (19 Sep 2019 21:46) (108/73 - 110/77)  BP(mean): --  RR: 18 (19 Sep 2019 21:46) (18 - 18)  SpO2: 98% (19 Sep 2019 21:46) (98% - 99%)  CAPILLARY BLOOD GLUCOSE        I&O's Summary    18 Sep 2019 07:01  -  19 Sep 2019 07:00  --------------------------------------------------------  IN: 0 mL / OUT: 650 mL / NET: -650 mL        PHYSICAL EXAM  GENERAL: NAD, lying comfortably in bed, cachetic  Head:  Atraumatic, Normocephalic  CHEST/LUNG: Clear to auscultation bilaterally; No wheeze  HEART: RRR, S1 and S2 No murmurs, rubs, or gallops  ABDOMEN: Soft, Nontender, Nondistended; Bowel sounds present  EXTREMITIES:  2+ Peripheral Pulses, No clubbing, cyanosis, or edema  NEURO: AAOx3, non-focal  SKIN: No rashes or lesions  LABS:                        13.4   11.66 )-----------( 232      ( 18 Sep 2019 10:24 )             40.4     09-18    129<L>  |  92<L>  |  24<H>  ----------------------------<  190<H>  3.7   |  22  |  0.62    Ca    8.7      18 Sep 2019 10:24  Phos  3.3     09-18  Mg     2.2     09-18    TPro  6.3  /  Alb  3.6  /  TBili  0.5  /  DBili  x   /  AST  13  /  ALT  117<H>  /  AlkPhos  101  09-18                RADIOLOGY & ADDITIONAL TESTS:    Imaging Personally Reviewed:  Consultant(s) Notes Reviewed:

## 2019-09-20 NOTE — PROGRESS NOTE ADULT - PROBLEM SELECTOR PLAN 4
Per documentation, pt has lost 25 lbs over a 2 week period.   - nutrition consulted, f/u recs  -continue low Na diet with ensure Per documentation, pt has lost 25 lbs over a 2 week period.   -continue low Na diet with ensure

## 2019-09-20 NOTE — PROGRESS NOTE ADULT - PROBLEM SELECTOR PLAN 1
2/2 NSCLC  -CTH redemonstrated multiple intracranial metastases. No hydrocephalus.  - Neurosurgery: not candidate for surgery  - oncology, rad onc and palliative care recs appreciated  - c/w decadron 4 mg q6 hr.  - c/w Keppra 500 mg bid for seizure prophylaxis  - c/w zofran and meclizine for n/v and vertigo management  - dilaudid 0.5mg q4h for HA  - fall precautions  - neuro checks  -radiation therapy treatment 3 (total 10) 2/2 NSCLC  -CTH redemonstrated multiple intracranial metastases. No hydrocephalus.  - Neurosurgery: not candidate for surgery  - oncology, rad onc and palliative care recs appreciated  - c/w decadron 4 mg q6 hr.  - c/w Keppra 500 mg bid for seizure prophylaxis  - c/w zofran and meclizine for n/v and vertigo management  - dilaudid 0.5mg q4h for HA  - fall precautions  -radiation therapy treatment 4 (total 10)

## 2019-09-20 NOTE — PROGRESS NOTE ADULT - PROBLEM SELECTOR PLAN 3
Continue Dexamethasone q6h as ordered by primary team. Getting RT. Appreciate neurosurgery involvement.

## 2019-09-20 NOTE — PROGRESS NOTE ADULT - ASSESSMENT
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 CT H redemonstrates numerous metastases without significant interval change with no evidence of hydrocephalus. Patient tolerating RT treatments.

## 2019-09-20 NOTE — PROGRESS NOTE ADULT - PROBLEM SELECTOR PLAN 1
Improved, tolerating PO. Continue with Ativan 0.25mg IV q6hr atc. with Ativan 0.25mg IV q3h PRN, will start tapering down next week near completion to RT.

## 2019-09-20 NOTE — PROGRESS NOTE ADULT - SUBJECTIVE AND OBJECTIVE BOX
INTERVAL HPI/OVERNIGHT EVENTS:  Pt indicates nausea is improved, tolerating PO.  Vertigo is still present but improved today.   Still feels weak, not able to walk much.  Plan is to continue with RT.     Code Status: DNR/DNI  Allergies    No Known Allergies    Intolerances    MEDICATIONS  (STANDING):  dexamethasone     Tablet 4 milliGRAM(s) Oral every 6 hours  dronabinol 5 milliGRAM(s) Oral two times a day  enoxaparin Injectable 40 milliGRAM(s) SubCutaneous daily  levETIRAcetam 500 milliGRAM(s) Oral two times a day  LORazepam   Injectable 0.25 milliGRAM(s) IV Push every 6 hours  nystatin    Suspension 650903 Unit(s) Oral four times a day  pantoprazole    Tablet 40 milliGRAM(s) Oral before breakfast  QUEtiapine 25 milliGRAM(s) Oral at bedtime  sodium chloride 0.9%. 500 milliLiter(s) (100 mL/Hr) IV Continuous <Continuous>    MEDICATIONS  (PRN):  acetaminophen   Tablet .. 650 milliGRAM(s) Oral every 6 hours PRN Mild Pain (1 - 3)  HYDROmorphone  Injectable 0.5 milliGRAM(s) IV Push every 4 hours PRN Severe Pain (7 - 10)  LORazepam   Injectable 0.25 milliGRAM(s) IV Push every 4 hours PRN Nausea and/or Vomiting  meclizine 25 milliGRAM(s) Oral two times a day PRN Dizziness  melatonin 3 milliGRAM(s) Oral at bedtime PRN Insomnia  ondansetron    Tablet 4 milliGRAM(s) Oral every 6 hours PRN Nausea and/or Vomiting  polyethylene glycol 3350 17 Gram(s) Oral two times a day PRN Constipation    PRESENT SYMPTOMS: [ ]Unable to obtain due to poor mentation   Source if other than patient:  [ ]Family   [ ]Team     Pain (Impact on QOL):  No pain  Location:  Severity:  Minimal acceptable level (0-10 scale):       Quality:       Onset:  Duration:  Aggravating factors:  Relieving Factors  Radiation:    Dyspnea:  Yes [ ] No [x ] - [ ]Mild [ ]Moderate [ ]Severe  Anxiety:    Yes [ ] No [x ] - [ ]Mild [ ]Moderate [ ]Severe  Fatigue:    Yes [x ] No [ ] - [ ]Mild [ x]Moderate [ ]Severe  Nausea:    Yes [ ] No [x ] - [ ]Mild [ ]Moderate [ ]Severe                         Loss of appetite: Yes [ ] No [ x] - [ ]Mild [ ]Moderate [ ]Severe             Constipation:  Yes [ ] No [x ] - [ ]Mild [ ]Moderate [ ]Severe  Grief:  Yes [ ] No [x ]     PAIN AD Score:	  [email or web address] (Ctrl + left click to view)    Other Symptoms: +vertigo  [ x]All other review of systems negative     Karnofsky Performance Score/Palliative Performance Status Version 2:       50  %    http://palliative.info/resource_material/[email or web address].pdf    PHYSICAL EXAM:  Vital Signs Last 24 Hrs  T(C): 36.6 (20 Sep 2019 14:52), Max: 36.6 (20 Sep 2019 14:52)  T(F): 97.8 (20 Sep 2019 14:52), Max: 97.8 (20 Sep 2019 14:52)  HR: 95 (20 Sep 2019 14:52) (68 - 95)  BP: 114/75 (20 Sep 2019 14:52) (104/62 - 114/75)  BP(mean): --  RR: 17 (20 Sep 2019 14:52) (16 - 18)  SpO2: 99% (20 Sep 2019 14:52) (95% - 99%) I&O's Summary    19 Sep 2019 07:01  -  20 Sep 2019 07:00  --------------------------------------------------------  IN: 0 mL / OUT: 1600 mL / NET: -1600 mL    GENERAL:  [x ]Alert  [x ]Oriented x 3   [ ]Lethargic  [ ]Cachexia  [ ]Unarousable  [x ]Verbal  [ ]Non-Verbal  Behavioral:   [ ] Anxiety  [ ] Delirium [ ] Agitation [ ] Other  HEENT:  [x ]Normal   [ ]Dry mouth   [ ]ET Tube/Trach  [ ]Oral lesions  PULMONARY:   [x ]Clear  [ ]Tachypnea  [ ]Audible excessive secretions   [ ]Rhonchi        [ ]Right [ ]Left [ ]Bilateral  [ ]Crackles        [ ]Right [ ]Left [ ]Bilateral  [ ]Wheezing     [ ]Right [ ]Left [ ]Bilateral  CARDIOVASCULAR:    [x ]Regular [ ]Irregular [ ]Tachy  [ ]Bryson [ ]Murmur [ ]Other  GASTROINTESTINAL:  [x ]Soft  [ ]Distended   [x ]+BS  [x ]Non tender [ ]Tender  [ ]PEG [ ]OGT/ NGT    GENITOURINARY:  [x ]Normal [ ] Incontinent   [ ]Oliguria/Anuria   [ ]Jimenez  MUSCULOSKELETAL:   [ ]Normal   [x ]Weakness  [ ]Bed/Wheelchair bound [ ]Edema  NEUROLOGIC:   [x ]No focal deficits  [ ] Cognitive impairment  [ ] Dysphagia [ ]Dysarthria [ ] Paresis [ ]Other   SKIN:   [x ]Normal   [ ]Pressure ulcer(s)  [ ]Rash    CRITICAL CARE:  [ ] Shock Present  [ ]Septic [ ]Cardiogenic [ ]Neurologic [ ]Hypovolemic  [ ]  Vasopressors [ ]  Inotropes   [ ] Respiratory failure present  [ ] Acute  [ ] Chronic [ ] Hypoxic  [ ] Hypercarbic [ ] Other  [ ] Other organ failure     LABS:                        13.4   11.66 )-----------( 232      ( 18 Sep 2019 10:24 )             40.4     09-18    129<L>  |  92<L>  |  24<H>  ----------------------------<  190<H>  3.7   |  22  |  0.62    Ca    8.7      18 Sep 2019 10:24  Phos  3.3     09-18  Mg     2.2     09-18    TPro  6.3  /  Alb  3.6  /  TBili  0.5  /  DBili  x   /  AST  13  /  ALT  117<H>  /  AlkPhos  101  09-18    RADIOLOGY & ADDITIONAL STUDIES: reviewed.     Protein Calorie Malnutrition Present: [ ] yes [ ] no  [ ] PPSV2 < or = 30%  [ ] significant weight loss [ ] poor nutritional intake [ ] anasarca [ ] catabolic state Albumin, Serum: 3.9 g/dL (09-13-19 @ 08:10)    REFERRALS:   [ ]Chaplaincy  [ ] Hospice  [ ]Child Life  [ ]Social Work  [ ]Case management [ ]Holistic Therapy   Goals of Care Document:

## 2019-09-21 PROCEDURE — 99233 SBSQ HOSP IP/OBS HIGH 50: CPT | Mod: GC

## 2019-09-21 RX ADMIN — ENOXAPARIN SODIUM 40 MILLIGRAM(S): 100 INJECTION SUBCUTANEOUS at 14:26

## 2019-09-21 RX ADMIN — QUETIAPINE FUMARATE 25 MILLIGRAM(S): 200 TABLET, FILM COATED ORAL at 23:22

## 2019-09-21 RX ADMIN — Medication 4 MILLIGRAM(S): at 17:57

## 2019-09-21 RX ADMIN — Medication 0.25 MILLIGRAM(S): at 14:27

## 2019-09-21 RX ADMIN — Medication 0.25 MILLIGRAM(S): at 17:58

## 2019-09-21 RX ADMIN — Medication 5 MILLIGRAM(S): at 17:58

## 2019-09-21 RX ADMIN — Medication 4 MILLIGRAM(S): at 14:26

## 2019-09-21 RX ADMIN — PANTOPRAZOLE SODIUM 40 MILLIGRAM(S): 20 TABLET, DELAYED RELEASE ORAL at 05:43

## 2019-09-21 RX ADMIN — LEVETIRACETAM 500 MILLIGRAM(S): 250 TABLET, FILM COATED ORAL at 05:44

## 2019-09-21 RX ADMIN — Medication 0.25 MILLIGRAM(S): at 23:22

## 2019-09-21 RX ADMIN — SODIUM CHLORIDE 100 MILLILITER(S): 9 INJECTION INTRAMUSCULAR; INTRAVENOUS; SUBCUTANEOUS at 06:07

## 2019-09-21 RX ADMIN — Medication 5 MILLIGRAM(S): at 07:35

## 2019-09-21 RX ADMIN — Medication 500000 UNIT(S): at 05:43

## 2019-09-21 RX ADMIN — LEVETIRACETAM 500 MILLIGRAM(S): 250 TABLET, FILM COATED ORAL at 17:57

## 2019-09-21 RX ADMIN — Medication 4 MILLIGRAM(S): at 23:22

## 2019-09-21 RX ADMIN — Medication 0.25 MILLIGRAM(S): at 06:09

## 2019-09-21 RX ADMIN — Medication 500000 UNIT(S): at 23:22

## 2019-09-21 RX ADMIN — Medication 4 MILLIGRAM(S): at 05:43

## 2019-09-21 NOTE — PROGRESS NOTE ADULT - SUBJECTIVE AND OBJECTIVE BOX
Patient is a 68y old  Male who presents with a chief complaint of blurry vision, HA, vertigo (20 Sep 2019 15:06)      SUBJECTIVE / OVERNIGHT EVENTS:          MEDICATIONS  (STANDING):  dexamethasone     Tablet 4 milliGRAM(s) Oral every 6 hours  dronabinol 5 milliGRAM(s) Oral two times a day  enoxaparin Injectable 40 milliGRAM(s) SubCutaneous daily  levETIRAcetam 500 milliGRAM(s) Oral two times a day  LORazepam   Injectable 0.25 milliGRAM(s) IV Push every 6 hours  nystatin    Suspension 822801 Unit(s) Oral four times a day  pantoprazole    Tablet 40 milliGRAM(s) Oral before breakfast  QUEtiapine 25 milliGRAM(s) Oral at bedtime  sodium chloride 0.9%. 500 milliLiter(s) (100 mL/Hr) IV Continuous <Continuous>    MEDICATIONS  (PRN):  acetaminophen   Tablet .. 650 milliGRAM(s) Oral every 6 hours PRN Mild Pain (1 - 3)  LORazepam   Injectable 0.25 milliGRAM(s) IV Push every 4 hours PRN Nausea and/or Vomiting  meclizine 25 milliGRAM(s) Oral two times a day PRN Dizziness  melatonin 3 milliGRAM(s) Oral at bedtime PRN Insomnia  ondansetron    Tablet 4 milliGRAM(s) Oral every 6 hours PRN Nausea and/or Vomiting  polyethylene glycol 3350 17 Gram(s) Oral two times a day PRN Constipation      Vital Signs Last 24 Hrs  T(C): 37.2 (21 Sep 2019 06:00), Max: 37.2 (21 Sep 2019 06:00)  T(F): 98.9 (21 Sep 2019 06:00), Max: 98.9 (21 Sep 2019 06:00)  HR: 83 (21 Sep 2019 06:00) (83 - 95)  BP: 102/58 (21 Sep 2019 06:00) (100/69 - 114/75)  BP(mean): --  RR: 18 (21 Sep 2019 06:00) (17 - 18)  SpO2: 98% (21 Sep 2019 06:00) (96% - 99%)      PHYSICAL EXAM  GENERAL: NAD, well-developed  HEAD:  Atraumatic, Normocephalic  EYES: EOMI, PERRLA, conjunctiva and sclera clear  NECK: Supple, No JVD  CHEST/LUNG: Clear to auscultation bilaterally; No wheeze  HEART: Regular rate and rhythm; No murmurs, rubs, or gallops  ABDOMEN: Soft, Nontender, Nondistended; Bowel sounds present  EXTREMITIES:  2+ Peripheral Pulses, No clubbing, cyanosis, or edema  PSYCH: AAOx3  SKIN: No rashes or lesions    CAPILLARY BLOOD GLUCOSE        I&O's Summary    20 Sep 2019 07:01  -  21 Sep 2019 07:00  --------------------------------------------------------  IN: 1200 mL / OUT: 600 mL / NET: 600 mL        LABS:                    RADIOLOGY & ADDITIONAL TESTS:     MICROBIOLOGY:    ANTIMICROBIALS:    CONSULTS: Patient is a 68y old  Male who presents with a chief complaint of blurry vision, HA, vertigo (20 Sep 2019 15:06)      SUBJECTIVE / OVERNIGHT EVENTS:    No acute events overnight. Denies fever, chills, nausea, vomiting, abdominal pain, diarrhea, constipation, CP, palpitations, SOB, or LE edema.       MEDICATIONS  (STANDING):  dexamethasone     Tablet 4 milliGRAM(s) Oral every 6 hours  dronabinol 5 milliGRAM(s) Oral two times a day  enoxaparin Injectable 40 milliGRAM(s) SubCutaneous daily  levETIRAcetam 500 milliGRAM(s) Oral two times a day  LORazepam   Injectable 0.25 milliGRAM(s) IV Push every 6 hours  nystatin    Suspension 282049 Unit(s) Oral four times a day  pantoprazole    Tablet 40 milliGRAM(s) Oral before breakfast  QUEtiapine 25 milliGRAM(s) Oral at bedtime  sodium chloride 0.9%. 500 milliLiter(s) (100 mL/Hr) IV Continuous <Continuous>    MEDICATIONS  (PRN):  acetaminophen   Tablet .. 650 milliGRAM(s) Oral every 6 hours PRN Mild Pain (1 - 3)  LORazepam   Injectable 0.25 milliGRAM(s) IV Push every 4 hours PRN Nausea and/or Vomiting  meclizine 25 milliGRAM(s) Oral two times a day PRN Dizziness  melatonin 3 milliGRAM(s) Oral at bedtime PRN Insomnia  ondansetron    Tablet 4 milliGRAM(s) Oral every 6 hours PRN Nausea and/or Vomiting  polyethylene glycol 3350 17 Gram(s) Oral two times a day PRN Constipation      Vital Signs Last 24 Hrs  T(C): 37.2 (21 Sep 2019 06:00), Max: 37.2 (21 Sep 2019 06:00)  T(F): 98.9 (21 Sep 2019 06:00), Max: 98.9 (21 Sep 2019 06:00)  HR: 83 (21 Sep 2019 06:00) (83 - 95)  BP: 102/58 (21 Sep 2019 06:00) (100/69 - 114/75)  BP(mean): --  RR: 18 (21 Sep 2019 06:00) (17 - 18)  SpO2: 98% (21 Sep 2019 06:00) (96% - 99%)      PHYSICAL EXAM  GENERAL: NAD, well-developed  HEAD:  Atraumatic, Normocephalic  EYES: EOMI, PERRLA, conjunctiva and sclera clear  NECK: Supple, No JVD  CHEST/LUNG: Clear to auscultation bilaterally; No wheeze  HEART: Regular rate and rhythm; No murmurs, rubs, or gallops  ABDOMEN: Soft, Nontender, Nondistended; Bowel sounds present  EXTREMITIES:  2+ Peripheral Pulses, No clubbing, cyanosis, or edema  PSYCH: AAOx3  SKIN: No rashes or lesions    CAPILLARY BLOOD GLUCOSE        I&O's Summary    20 Sep 2019 07:01  -  21 Sep 2019 07:00  --------------------------------------------------------  IN: 1200 mL / OUT: 600 mL / NET: 600 mL        LABS:                    RADIOLOGY & ADDITIONAL TESTS:     MICROBIOLOGY:    ANTIMICROBIALS:    CONSULTS: Patient is a 68y old  Male who presents with a chief complaint of blurry vision, HA, vertigo (20 Sep 2019 15:06)      SUBJECTIVE / OVERNIGHT EVENTS:    No acute events overnight. Denies fever, chills, nausea, vomiting, abdominal pain, diarrhea, constipation, CP, palpitations, SOB, or LE edema.       MEDICATIONS  (STANDING):  dexamethasone     Tablet 4 milliGRAM(s) Oral every 6 hours  dronabinol 5 milliGRAM(s) Oral two times a day  enoxaparin Injectable 40 milliGRAM(s) SubCutaneous daily  levETIRAcetam 500 milliGRAM(s) Oral two times a day  LORazepam   Injectable 0.25 milliGRAM(s) IV Push every 6 hours  nystatin    Suspension 649061 Unit(s) Oral four times a day  pantoprazole    Tablet 40 milliGRAM(s) Oral before breakfast  QUEtiapine 25 milliGRAM(s) Oral at bedtime  sodium chloride 0.9%. 500 milliLiter(s) (100 mL/Hr) IV Continuous <Continuous>    MEDICATIONS  (PRN):  acetaminophen   Tablet .. 650 milliGRAM(s) Oral every 6 hours PRN Mild Pain (1 - 3)  LORazepam   Injectable 0.25 milliGRAM(s) IV Push every 4 hours PRN Nausea and/or Vomiting  meclizine 25 milliGRAM(s) Oral two times a day PRN Dizziness  melatonin 3 milliGRAM(s) Oral at bedtime PRN Insomnia  ondansetron    Tablet 4 milliGRAM(s) Oral every 6 hours PRN Nausea and/or Vomiting  polyethylene glycol 3350 17 Gram(s) Oral two times a day PRN Constipation      Vital Signs Last 24 Hrs  T(C): 37.2 (21 Sep 2019 06:00), Max: 37.2 (21 Sep 2019 06:00)  T(F): 98.9 (21 Sep 2019 06:00), Max: 98.9 (21 Sep 2019 06:00)  HR: 83 (21 Sep 2019 06:00) (83 - 95)  BP: 102/58 (21 Sep 2019 06:00) (100/69 - 114/75)  BP(mean): --  RR: 18 (21 Sep 2019 06:00) (17 - 18)  SpO2: 98% (21 Sep 2019 06:00) (96% - 99%)      PHYSICAL EXAM  GENERAL: NAD, Cachectic, AAOX2-3  HEAD:  Atraumatic, Normocephalic  EYES: EOMI, PERRLA, conjunctiva and sclera clear  NECK: Supple, No JVD  CHEST/LUNG: Clear to auscultation bilaterally; No wheeze  HEART: Regular rate and rhythm; No murmurs, rubs, or gallops  ABDOMEN: Soft, Nontender, Nondistended; Bowel sounds present  EXTREMITIES:  2+ Peripheral Pulses, No clubbing, cyanosis, or edema  PSYCH: AAOx3  SKIN: No rashes or lesions    CAPILLARY BLOOD GLUCOSE        I&O's Summary    20 Sep 2019 07:01  -  21 Sep 2019 07:00  --------------------------------------------------------  IN: 1200 mL / OUT: 600 mL / NET: 600 mL        LABS:                    RADIOLOGY & ADDITIONAL TESTS:     MICROBIOLOGY:    ANTIMICROBIALS:    CONSULTS:

## 2019-09-21 NOTE — PROGRESS NOTE ADULT - PROBLEM SELECTOR PLAN 1
2/2 NSCLC  -CTH redemonstrated multiple intracranial metastases. No hydrocephalus.  - Neurosurgery: not candidate for surgery  - oncology, rad onc and palliative care recs appreciated  - c/w decadron 4 mg q6 hr.  - c/w Keppra 500 mg bid for seizure prophylaxis  - c/w zofran and meclizine for n/v and vertigo management  - dilaudid 0.5mg q4h for HA  - fall precautions  -radiation therapy treatment 4 (total 10)

## 2019-09-22 PROCEDURE — 99233 SBSQ HOSP IP/OBS HIGH 50: CPT | Mod: GC

## 2019-09-22 RX ADMIN — SODIUM CHLORIDE 100 MILLILITER(S): 9 INJECTION INTRAMUSCULAR; INTRAVENOUS; SUBCUTANEOUS at 09:11

## 2019-09-22 RX ADMIN — ENOXAPARIN SODIUM 40 MILLIGRAM(S): 100 INJECTION SUBCUTANEOUS at 12:01

## 2019-09-22 RX ADMIN — Medication 4 MILLIGRAM(S): at 23:42

## 2019-09-22 RX ADMIN — Medication 500000 UNIT(S): at 12:06

## 2019-09-22 RX ADMIN — PANTOPRAZOLE SODIUM 40 MILLIGRAM(S): 20 TABLET, DELAYED RELEASE ORAL at 07:16

## 2019-09-22 RX ADMIN — Medication 5 MILLIGRAM(S): at 18:03

## 2019-09-22 RX ADMIN — Medication 4 MILLIGRAM(S): at 07:11

## 2019-09-22 RX ADMIN — Medication 0.25 MILLIGRAM(S): at 23:43

## 2019-09-22 RX ADMIN — Medication 4 MILLIGRAM(S): at 12:06

## 2019-09-22 RX ADMIN — LEVETIRACETAM 500 MILLIGRAM(S): 250 TABLET, FILM COATED ORAL at 18:04

## 2019-09-22 RX ADMIN — Medication 4 MILLIGRAM(S): at 18:04

## 2019-09-22 RX ADMIN — QUETIAPINE FUMARATE 25 MILLIGRAM(S): 200 TABLET, FILM COATED ORAL at 21:40

## 2019-09-22 RX ADMIN — Medication 0.25 MILLIGRAM(S): at 07:10

## 2019-09-22 RX ADMIN — Medication 500000 UNIT(S): at 07:11

## 2019-09-22 RX ADMIN — Medication 500000 UNIT(S): at 23:42

## 2019-09-22 RX ADMIN — Medication 0.25 MILLIGRAM(S): at 12:00

## 2019-09-22 RX ADMIN — Medication 500000 UNIT(S): at 18:04

## 2019-09-22 RX ADMIN — LEVETIRACETAM 500 MILLIGRAM(S): 250 TABLET, FILM COATED ORAL at 07:11

## 2019-09-22 RX ADMIN — SODIUM CHLORIDE 100 MILLILITER(S): 9 INJECTION INTRAMUSCULAR; INTRAVENOUS; SUBCUTANEOUS at 21:39

## 2019-09-22 RX ADMIN — Medication 5 MILLIGRAM(S): at 07:15

## 2019-09-22 RX ADMIN — Medication 0.25 MILLIGRAM(S): at 18:03

## 2019-09-22 NOTE — PROGRESS NOTE ADULT - PROBLEM SELECTOR PLAN 1
2/2 NSCLC  -CTH redemonstrated multiple intracranial metastases. No hydrocephalus.  - Neurosurgery: not candidate for surgery  - oncology, rad onc and palliative care recs appreciated  - c/w decadron 4 mg q6 hr.  - c/w Keppra 500 mg bid for seizure prophylaxis  - c/w zofran and meclizine for n/v and vertigo management  - dilaudid 0.5mg q4h for HA  - fall precautions  -radiation therapy treatment 4 (total 10) 2/2 NSCLC  -CTH redemonstrated multiple intracranial metastases. No hydrocephalus.  - Neurosurgery: not candidate for surgery  - oncology, rad onc and palliative care recs appreciated  - c/w decadron 4 mg q6 hr.  - c/w Keppra 500 mg bid for seizure prophylaxis  - c/w zofran and meclizine for n/v and vertigo management  - dilaudid 0.5mg q4h for HA  - fall precautions  -radiation therapy treatment 4 (total 10). Will resume on Monday

## 2019-09-22 NOTE — PROGRESS NOTE ADULT - SUBJECTIVE AND OBJECTIVE BOX
Alexa Stringer MD  PGY-1  Pager [phone]        Patient is a 68y old  Male who presents with a chief complaint of blurry vision, HA, vertigo (21 Sep 2019 14:06)        SUBJECTIVE / OVERNIGHT EVENTS: Patient had no acute events overnight. Patient seen and examined at bedside this morning.     ROS: [ - ] Fever [ - ] Chills [ - ] Nausea/Vomiting [ - ] Chest Pain [ - ] Shortness of breath     MEDICATIONS  (STANDING):  dexamethasone     Tablet 4 milliGRAM(s) Oral every 6 hours  dronabinol 5 milliGRAM(s) Oral two times a day  enoxaparin Injectable 40 milliGRAM(s) SubCutaneous daily  levETIRAcetam 500 milliGRAM(s) Oral two times a day  LORazepam   Injectable 0.25 milliGRAM(s) IV Push every 6 hours  nystatin    Suspension 561986 Unit(s) Oral four times a day  pantoprazole    Tablet 40 milliGRAM(s) Oral before breakfast  QUEtiapine 25 milliGRAM(s) Oral at bedtime  sodium chloride 0.9%. 500 milliLiter(s) (100 mL/Hr) IV Continuous <Continuous>    MEDICATIONS  (PRN):  acetaminophen   Tablet .. 650 milliGRAM(s) Oral every 6 hours PRN Mild Pain (1 - 3)  LORazepam   Injectable 0.25 milliGRAM(s) IV Push every 4 hours PRN Nausea and/or Vomiting  meclizine 25 milliGRAM(s) Oral two times a day PRN Dizziness  melatonin 3 milliGRAM(s) Oral at bedtime PRN Insomnia  ondansetron    Tablet 4 milliGRAM(s) Oral every 6 hours PRN Nausea and/or Vomiting  polyethylene glycol 3350 17 Gram(s) Oral two times a day PRN Constipation      Vital Signs Last 24 Hrs  T(C): 36.4 (21 Sep 2019 21:55), Max: 36.6 (21 Sep 2019 14:23)  T(F): 97.5 (21 Sep 2019 21:55), Max: 97.9 (21 Sep 2019 14:23)  HR: 80 (21 Sep 2019 21:55) (75 - 80)  BP: 110/74 (21 Sep 2019 21:55) (103/61 - 110/74)  BP(mean): --  RR: 18 (21 Sep 2019 21:55) (18 - 18)  SpO2: 99% (21 Sep 2019 21:55) (99% - 99%)  CAPILLARY BLOOD GLUCOSE        I&O's Summary    20 Sep 2019 07:01  -  21 Sep 2019 07:00  --------------------------------------------------------  IN: 1200 mL / OUT: 600 mL / NET: 600 mL        PHYSICAL EXAM  GENERAL: NAD, lying comfortably in bed, cachetic  Head:  Atraumatic, Normocephalic  CHEST/LUNG: Clear to auscultation bilaterally; No wheeze  HEART: RRR, S1 and S2 No murmurs, rubs, or gallops  ABDOMEN: Soft, Nontender, Nondistended; Bowel sounds present  EXTREMITIES:  2+ Peripheral Pulses, No clubbing, cyanosis, or edema  NEURO: AAOx3, non-focal  SKIN: No rashes or lesions    LABS:                      RADIOLOGY & ADDITIONAL TESTS:    Imaging Personally Reviewed:  Consultant(s) Notes Reviewed: Alexa Stringer MD  PGY-1  Pager [phone]        Patient is a 68y old  Male who presents with a chief complaint of blurry vision, HA, vertigo (21 Sep 2019 14:06)        SUBJECTIVE / OVERNIGHT EVENTS: Patient had no acute events overnight. Patient seen and examined at bedside this morning.  Dizziness came back and had BM this morning. Weakness and blurry vision unchanged. Patient concerned about the blurry vision after dilation by his outpatient opthomologist >2 weeks ago. Blurry vision most likely from lesion in the brain.     ROS: [ - ] Fever [ - ] Chills [ - ] Nausea/Vomiting [ - ] Chest Pain [ - ] Shortness of breath     MEDICATIONS  (STANDING):  dexamethasone     Tablet 4 milliGRAM(s) Oral every 6 hours  dronabinol 5 milliGRAM(s) Oral two times a day  enoxaparin Injectable 40 milliGRAM(s) SubCutaneous daily  levETIRAcetam 500 milliGRAM(s) Oral two times a day  LORazepam   Injectable 0.25 milliGRAM(s) IV Push every 6 hours  nystatin    Suspension 443374 Unit(s) Oral four times a day  pantoprazole    Tablet 40 milliGRAM(s) Oral before breakfast  QUEtiapine 25 milliGRAM(s) Oral at bedtime  sodium chloride 0.9%. 500 milliLiter(s) (100 mL/Hr) IV Continuous <Continuous>    MEDICATIONS  (PRN):  acetaminophen   Tablet .. 650 milliGRAM(s) Oral every 6 hours PRN Mild Pain (1 - 3)  LORazepam   Injectable 0.25 milliGRAM(s) IV Push every 4 hours PRN Nausea and/or Vomiting  meclizine 25 milliGRAM(s) Oral two times a day PRN Dizziness  melatonin 3 milliGRAM(s) Oral at bedtime PRN Insomnia  ondansetron    Tablet 4 milliGRAM(s) Oral every 6 hours PRN Nausea and/or Vomiting  polyethylene glycol 3350 17 Gram(s) Oral two times a day PRN Constipation      Vital Signs Last 24 Hrs  T(C): 36.4 (21 Sep 2019 21:55), Max: 36.6 (21 Sep 2019 14:23)  T(F): 97.5 (21 Sep 2019 21:55), Max: 97.9 (21 Sep 2019 14:23)  HR: 80 (21 Sep 2019 21:55) (75 - 80)  BP: 110/74 (21 Sep 2019 21:55) (103/61 - 110/74)  BP(mean): --  RR: 18 (21 Sep 2019 21:55) (18 - 18)  SpO2: 99% (21 Sep 2019 21:55) (99% - 99%)  CAPILLARY BLOOD GLUCOSE        I&O's Summary    20 Sep 2019 07:01  -  21 Sep 2019 07:00  --------------------------------------------------------  IN: 1200 mL / OUT: 600 mL / NET: 600 mL        PHYSICAL EXAM  GENERAL: NAD, lying comfortably in bed, cachetic  Head:  Atraumatic, Normocephalic  CHEST/LUNG: Clear to auscultation bilaterally; No wheeze  HEART: RRR, S1 and S2 No murmurs, rubs, or gallops  ABDOMEN: Soft, Nontender, Nondistended; Bowel sounds present  EXTREMITIES:  2+ Peripheral Pulses, No clubbing, cyanosis, or edema  NEURO: AAOx3, non-focal  SKIN: No rashes or lesions    LABS:

## 2019-09-23 LAB
ALBUMIN SERPL ELPH-MCNC: 2.9 G/DL — LOW (ref 3.3–5)
ALP SERPL-CCNC: 71 U/L — SIGNIFICANT CHANGE UP (ref 40–120)
ALT FLD-CCNC: 44 U/L — HIGH (ref 4–41)
ANION GAP SERPL CALC-SCNC: 16 MMO/L — HIGH (ref 7–14)
AST SERPL-CCNC: 10 U/L — SIGNIFICANT CHANGE UP (ref 4–40)
BILIRUB SERPL-MCNC: 0.3 MG/DL — SIGNIFICANT CHANGE UP (ref 0.2–1.2)
BUN SERPL-MCNC: 14 MG/DL — SIGNIFICANT CHANGE UP (ref 7–23)
CALCIUM SERPL-MCNC: 7.8 MG/DL — LOW (ref 8.4–10.5)
CHLORIDE SERPL-SCNC: 101 MMOL/L — SIGNIFICANT CHANGE UP (ref 98–107)
CO2 SERPL-SCNC: 18 MMOL/L — LOW (ref 22–31)
CREAT SERPL-MCNC: 0.51 MG/DL — SIGNIFICANT CHANGE UP (ref 0.5–1.3)
GLUCOSE BLDC GLUCOMTR-MCNC: 123 MG/DL — HIGH (ref 70–99)
GLUCOSE SERPL-MCNC: 209 MG/DL — HIGH (ref 70–99)
HCT VFR BLD CALC: 33.3 % — LOW (ref 39–50)
HGB BLD-MCNC: 11.1 G/DL — LOW (ref 13–17)
MAGNESIUM SERPL-MCNC: 2 MG/DL — SIGNIFICANT CHANGE UP (ref 1.6–2.6)
MCHC RBC-ENTMCNC: 28.5 PG — SIGNIFICANT CHANGE UP (ref 27–34)
MCHC RBC-ENTMCNC: 33.3 % — SIGNIFICANT CHANGE UP (ref 32–36)
MCV RBC AUTO: 85.4 FL — SIGNIFICANT CHANGE UP (ref 80–100)
NRBC # FLD: 0 K/UL — SIGNIFICANT CHANGE UP (ref 0–0)
PHOSPHATE SERPL-MCNC: 2.7 MG/DL — SIGNIFICANT CHANGE UP (ref 2.5–4.5)
PLATELET # BLD AUTO: 171 K/UL — SIGNIFICANT CHANGE UP (ref 150–400)
PMV BLD: 9.1 FL — SIGNIFICANT CHANGE UP (ref 7–13)
POTASSIUM SERPL-MCNC: 2.8 MMOL/L — CRITICAL LOW (ref 3.5–5.3)
POTASSIUM SERPL-SCNC: 2.8 MMOL/L — CRITICAL LOW (ref 3.5–5.3)
PROT SERPL-MCNC: 4.9 G/DL — LOW (ref 6–8.3)
RBC # BLD: 3.9 M/UL — LOW (ref 4.2–5.8)
RBC # FLD: 12.7 % — SIGNIFICANT CHANGE UP (ref 10.3–14.5)
SODIUM SERPL-SCNC: 135 MMOL/L — SIGNIFICANT CHANGE UP (ref 135–145)
WBC # BLD: 9.39 K/UL — SIGNIFICANT CHANGE UP (ref 3.8–10.5)
WBC # FLD AUTO: 9.39 K/UL — SIGNIFICANT CHANGE UP (ref 3.8–10.5)

## 2019-09-23 PROCEDURE — 77427 RADIATION TX MANAGEMENT X5: CPT

## 2019-09-23 PROCEDURE — 99233 SBSQ HOSP IP/OBS HIGH 50: CPT

## 2019-09-23 RX ORDER — DEXTROSE 50 % IN WATER 50 %
25 SYRINGE (ML) INTRAVENOUS ONCE
Refills: 0 | Status: DISCONTINUED | OUTPATIENT
Start: 2019-09-23 | End: 2019-09-30

## 2019-09-23 RX ORDER — POTASSIUM CHLORIDE 20 MEQ
40 PACKET (EA) ORAL ONCE
Refills: 0 | Status: COMPLETED | OUTPATIENT
Start: 2019-09-23 | End: 2019-09-23

## 2019-09-23 RX ORDER — POTASSIUM CHLORIDE 20 MEQ
20 PACKET (EA) ORAL
Refills: 0 | Status: COMPLETED | OUTPATIENT
Start: 2019-09-23 | End: 2019-09-23

## 2019-09-23 RX ORDER — INSULIN LISPRO 100/ML
VIAL (ML) SUBCUTANEOUS
Refills: 0 | Status: DISCONTINUED | OUTPATIENT
Start: 2019-09-23 | End: 2019-09-30

## 2019-09-23 RX ORDER — GLUCAGON INJECTION, SOLUTION 0.5 MG/.1ML
1 INJECTION, SOLUTION SUBCUTANEOUS ONCE
Refills: 0 | Status: DISCONTINUED | OUTPATIENT
Start: 2019-09-23 | End: 2019-09-30

## 2019-09-23 RX ORDER — DEXTROSE 50 % IN WATER 50 %
15 SYRINGE (ML) INTRAVENOUS ONCE
Refills: 0 | Status: DISCONTINUED | OUTPATIENT
Start: 2019-09-23 | End: 2019-09-30

## 2019-09-23 RX ORDER — SODIUM CHLORIDE 9 MG/ML
1000 INJECTION, SOLUTION INTRAVENOUS
Refills: 0 | Status: DISCONTINUED | OUTPATIENT
Start: 2019-09-23 | End: 2019-09-30

## 2019-09-23 RX ORDER — DEXTROSE 50 % IN WATER 50 %
12.5 SYRINGE (ML) INTRAVENOUS ONCE
Refills: 0 | Status: DISCONTINUED | OUTPATIENT
Start: 2019-09-23 | End: 2019-09-30

## 2019-09-23 RX ADMIN — LEVETIRACETAM 500 MILLIGRAM(S): 250 TABLET, FILM COATED ORAL at 18:36

## 2019-09-23 RX ADMIN — Medication 500000 UNIT(S): at 06:47

## 2019-09-23 RX ADMIN — Medication 0.25 MILLIGRAM(S): at 18:33

## 2019-09-23 RX ADMIN — Medication 0.25 MILLIGRAM(S): at 23:23

## 2019-09-23 RX ADMIN — Medication 5 MILLIGRAM(S): at 06:48

## 2019-09-23 RX ADMIN — Medication 2: at 18:36

## 2019-09-23 RX ADMIN — Medication 500000 UNIT(S): at 18:35

## 2019-09-23 RX ADMIN — Medication 40 MILLIEQUIVALENT(S): at 10:32

## 2019-09-23 RX ADMIN — Medication 4 MILLIGRAM(S): at 06:48

## 2019-09-23 RX ADMIN — Medication 20 MILLIEQUIVALENT(S): at 14:11

## 2019-09-23 RX ADMIN — Medication 4 MILLIGRAM(S): at 23:22

## 2019-09-23 RX ADMIN — Medication 500000 UNIT(S): at 14:03

## 2019-09-23 RX ADMIN — Medication 4 MILLIGRAM(S): at 18:36

## 2019-09-23 RX ADMIN — Medication 25 MILLIGRAM(S): at 18:35

## 2019-09-23 RX ADMIN — SODIUM CHLORIDE 100 MILLILITER(S): 9 INJECTION INTRAMUSCULAR; INTRAVENOUS; SUBCUTANEOUS at 14:04

## 2019-09-23 RX ADMIN — Medication 0.25 MILLIGRAM(S): at 14:02

## 2019-09-23 RX ADMIN — ENOXAPARIN SODIUM 40 MILLIGRAM(S): 100 INJECTION SUBCUTANEOUS at 14:03

## 2019-09-23 RX ADMIN — Medication 0.25 MILLIGRAM(S): at 06:48

## 2019-09-23 RX ADMIN — PANTOPRAZOLE SODIUM 40 MILLIGRAM(S): 20 TABLET, DELAYED RELEASE ORAL at 06:48

## 2019-09-23 RX ADMIN — Medication 4 MILLIGRAM(S): at 14:03

## 2019-09-23 RX ADMIN — Medication 5 MILLIGRAM(S): at 18:38

## 2019-09-23 RX ADMIN — Medication 500000 UNIT(S): at 23:22

## 2019-09-23 RX ADMIN — Medication 20 MILLIEQUIVALENT(S): at 18:09

## 2019-09-23 RX ADMIN — LEVETIRACETAM 500 MILLIGRAM(S): 250 TABLET, FILM COATED ORAL at 06:48

## 2019-09-23 RX ADMIN — QUETIAPINE FUMARATE 25 MILLIGRAM(S): 200 TABLET, FILM COATED ORAL at 23:22

## 2019-09-23 NOTE — PROGRESS NOTE ADULT - PROBLEM SELECTOR PLAN 5
Pt is DNR/DNI. MOLST in the chart DNR / DNI / Limited medical interventions / No feeding tube/ trial of IV fluids/ Use antibiotics. He has good insight about his poor prognosis. Emotional support was provided.

## 2019-09-23 NOTE — PROGRESS NOTE ADULT - PROBLEM SELECTOR PLAN 1
2/2 NSCLC  -CTH redemonstrated multiple intracranial metastases. No hydrocephalus.  - Neurosurgery: not candidate for surgery  - oncology, rad onc and palliative care recs appreciated  - c/w decadron 4 mg q6 hr.  - c/w Keppra 500 mg bid for seizure prophylaxis  - c/w zofran and meclizine for n/v and vertigo management  - dilaudid 0.5mg q4h for HA  - fall precautions  -radiation therapy treatment 4 (total 10). Will resume on Monday

## 2019-09-23 NOTE — PROVIDER CONTACT NOTE (OTHER) - ASSESSMENT
Pt. is resting comfortably with no s/s of acute distress.  Denies headache, dizziness, lightheadedness.  VS: BP 97/60 HR 93 temp 98.2 respirations 16 O2 sat 98%

## 2019-09-23 NOTE — PROGRESS NOTE ADULT - SUBJECTIVE AND OBJECTIVE BOX
Alexa Stringer MD  PGY-1  Pager [phone]        Patient is a 68y old  Male who presents with a chief complaint of blurry vision, HA, vertigo (23 Sep 2019 10:34)        SUBJECTIVE / OVERNIGHT EVENTS: Patient had no acute events overnight. Patient seen and examined at bedside this morning.     ROS: [ - ] Fever [ - ] Chills [ - ] Nausea/Vomiting [ - ] Chest Pain [ - ] Shortness of breath     MEDICATIONS  (STANDING):  dexamethasone     Tablet 4 milliGRAM(s) Oral every 6 hours  dextrose 5%. 1000 milliLiter(s) (50 mL/Hr) IV Continuous <Continuous>  dextrose 50% Injectable 12.5 Gram(s) IV Push once  dextrose 50% Injectable 25 Gram(s) IV Push once  dextrose 50% Injectable 25 Gram(s) IV Push once  dronabinol 5 milliGRAM(s) Oral two times a day  enoxaparin Injectable 40 milliGRAM(s) SubCutaneous daily  insulin lispro (HumaLOG) corrective regimen sliding scale   SubCutaneous three times a day before meals  levETIRAcetam 500 milliGRAM(s) Oral two times a day  LORazepam   Injectable 0.25 milliGRAM(s) IV Push every 6 hours  nystatin    Suspension 153557 Unit(s) Oral four times a day  pantoprazole    Tablet 40 milliGRAM(s) Oral before breakfast  potassium chloride    Tablet ER 20 milliEquivalent(s) Oral every 2 hours  QUEtiapine 25 milliGRAM(s) Oral at bedtime  sodium chloride 0.9%. 500 milliLiter(s) (100 mL/Hr) IV Continuous <Continuous>    MEDICATIONS  (PRN):  acetaminophen   Tablet .. 650 milliGRAM(s) Oral every 6 hours PRN Mild Pain (1 - 3)  dextrose 40% Gel 15 Gram(s) Oral once PRN Blood Glucose LESS THAN 70 milliGRAM(s)/deciliter  glucagon  Injectable 1 milliGRAM(s) IntraMuscular once PRN Glucose LESS THAN 70 milligrams/deciliter  LORazepam   Injectable 0.25 milliGRAM(s) IV Push every 4 hours PRN Nausea and/or Vomiting  meclizine 25 milliGRAM(s) Oral two times a day PRN Dizziness  melatonin 3 milliGRAM(s) Oral at bedtime PRN Insomnia  ondansetron    Tablet 4 milliGRAM(s) Oral every 6 hours PRN Nausea and/or Vomiting  polyethylene glycol 3350 17 Gram(s) Oral two times a day PRN Constipation      Vital Signs Last 24 Hrs  T(C): 36.7 (23 Sep 2019 13:04), Max: 36.8 (22 Sep 2019 18:04)  T(F): 98.1 (23 Sep 2019 13:04), Max: 98.3 (22 Sep 2019 18:04)  HR: 83 (23 Sep 2019 13:04) (75 - 87)  BP: 110/67 (23 Sep 2019 13:04) (96/57 - 116/75)  BP(mean): --  RR: 17 (23 Sep 2019 13:04) (16 - 17)  SpO2: 99% (23 Sep 2019 13:04) (98% - 100%)  CAPILLARY BLOOD GLUCOSE      POCT Blood Glucose.: 123 mg/dL (23 Sep 2019 12:45)    I&O's Summary    22 Sep 2019 07:01  -  23 Sep 2019 07:00  --------------------------------------------------------  IN: 800 mL / OUT: 450 mL / NET: 350 mL        PHYSICAL EXAM  GENERAL: NAD, lying comfortably in bed   Head:  Atraumatic, Normocephalic  EYES: EOMI, PERRLA, conjunctiva and sclera clear  NECK: Supple, No JVD  CHEST/LUNG: Clear to auscultation bilaterally; No wheeze  HEART: RRR, S1 and S2 No murmurs, rubs, or gallops  ABDOMEN: Soft, Nontender, Nondistended; Bowel sounds present  EXTREMITIES:  2+ Peripheral Pulses, No clubbing, cyanosis, or edema  NEURO: AAOx3, non-focal  SKIN: No rashes or lesions    LABS:                        11.1   9.39  )-----------( 171      ( 23 Sep 2019 07:15 )             33.3     09-23    135  |  101  |  14  ----------------------------<  209<H>  2.8<LL>   |  18<L>  |  0.51    Ca    7.8<L>      23 Sep 2019 07:15  Phos  2.7     09-23  Mg     2.0     09-23    TPro  4.9<L>  /  Alb  2.9<L>  /  TBili  0.3  /  DBili  x   /  AST  10  /  ALT  44<H>  /  AlkPhos  71  09-23                RADIOLOGY & ADDITIONAL TESTS:    Imaging Personally Reviewed:  Consultant(s) Notes Reviewed: Alexa Stringer MD  PGY-1  Pager [phone]        Patient is a 68y old  Male who presents with a chief complaint of blurry vision, HA, vertigo (23 Sep 2019 10:34)        SUBJECTIVE / OVERNIGHT EVENTS: Patient had no acute events overnight. Patient seen and examined at bedside this morning.     ROS: [ - ] Fever [ - ] Chills [ - ] Nausea/Vomiting [ - ] Chest Pain [ - ] Shortness of breath     MEDICATIONS  (STANDING):  dexamethasone     Tablet 4 milliGRAM(s) Oral every 6 hours  dextrose 5%. 1000 milliLiter(s) (50 mL/Hr) IV Continuous <Continuous>  dextrose 50% Injectable 12.5 Gram(s) IV Push once  dextrose 50% Injectable 25 Gram(s) IV Push once  dextrose 50% Injectable 25 Gram(s) IV Push once  dronabinol 5 milliGRAM(s) Oral two times a day  enoxaparin Injectable 40 milliGRAM(s) SubCutaneous daily  insulin lispro (HumaLOG) corrective regimen sliding scale   SubCutaneous three times a day before meals  levETIRAcetam 500 milliGRAM(s) Oral two times a day  LORazepam   Injectable 0.25 milliGRAM(s) IV Push every 6 hours  nystatin    Suspension 795224 Unit(s) Oral four times a day  pantoprazole    Tablet 40 milliGRAM(s) Oral before breakfast  potassium chloride    Tablet ER 20 milliEquivalent(s) Oral every 2 hours  QUEtiapine 25 milliGRAM(s) Oral at bedtime  sodium chloride 0.9%. 500 milliLiter(s) (100 mL/Hr) IV Continuous <Continuous>    MEDICATIONS  (PRN):  acetaminophen   Tablet .. 650 milliGRAM(s) Oral every 6 hours PRN Mild Pain (1 - 3)  dextrose 40% Gel 15 Gram(s) Oral once PRN Blood Glucose LESS THAN 70 milliGRAM(s)/deciliter  glucagon  Injectable 1 milliGRAM(s) IntraMuscular once PRN Glucose LESS THAN 70 milligrams/deciliter  LORazepam   Injectable 0.25 milliGRAM(s) IV Push every 4 hours PRN Nausea and/or Vomiting  meclizine 25 milliGRAM(s) Oral two times a day PRN Dizziness  melatonin 3 milliGRAM(s) Oral at bedtime PRN Insomnia  ondansetron    Tablet 4 milliGRAM(s) Oral every 6 hours PRN Nausea and/or Vomiting  polyethylene glycol 3350 17 Gram(s) Oral two times a day PRN Constipation      Vital Signs Last 24 Hrs  T(C): 36.7 (23 Sep 2019 13:04), Max: 36.8 (22 Sep 2019 18:04)  T(F): 98.1 (23 Sep 2019 13:04), Max: 98.3 (22 Sep 2019 18:04)  HR: 83 (23 Sep 2019 13:04) (75 - 87)  BP: 110/67 (23 Sep 2019 13:04) (96/57 - 116/75)  BP(mean): --  RR: 17 (23 Sep 2019 13:04) (16 - 17)  SpO2: 99% (23 Sep 2019 13:04) (98% - 100%)  CAPILLARY BLOOD GLUCOSE      POCT Blood Glucose.: 123 mg/dL (23 Sep 2019 12:45)    I&O's Summary    22 Sep 2019 07:01  -  23 Sep 2019 07:00  --------------------------------------------------------  IN: 800 mL / OUT: 450 mL / NET: 350 mL        PHYSICAL EXAM  GENERAL: NAD, lying comfortably in bed, cachetic  Head:  Atraumatic, Normocephalic  CHEST/LUNG: Clear to auscultation bilaterally; No wheeze  HEART: RRR, S1 and S2 No murmurs, rubs, or gallops  ABDOMEN: Soft, Nontender, Nondistended; Bowel sounds present  EXTREMITIES:  2+ Peripheral Pulses, No clubbing, cyanosis, or edema  NEURO: AAOx3, non-focal  SKIN: No rashes or lesions    LABS:                        11.1   9.39  )-----------( 171      ( 23 Sep 2019 07:15 )             33.3     09-23    135  |  101  |  14  ----------------------------<  209<H>  2.8<LL>   |  18<L>  |  0.51    Ca    7.8<L>      23 Sep 2019 07:15  Phos  2.7     09-23  Mg     2.0     09-23    TPro  4.9<L>  /  Alb  2.9<L>  /  TBili  0.3  /  DBili  x   /  AST  10  /  ALT  44<H>  /  AlkPhos  71  09-23

## 2019-09-23 NOTE — PROGRESS NOTE ADULT - PROBLEM SELECTOR PLAN 1
Worsening. Continue with Ativan 0.25mg IV q6hr atc. with Ativan 0.25mg IV q3h PRN, No PRNs given despite worsening nausea/vomiting. Pt encouraged to ask for PRNs in case of nausea. For now will keep IVs as nausea is worsening.  Pt already on steroids.

## 2019-09-23 NOTE — PROGRESS NOTE ADULT - SUBJECTIVE AND OBJECTIVE BOX
INTERVAL HPI/OVERNIGHT EVENTS:  Today pt indicates that nausea/vomiting and vertigo worsened.   Feels weak, not able to walk much.  Plan is to continue with RT.     Code Status: DNR/DNI  Allergies    No Known Allergies    Intolerances    MEDICATIONS  (STANDING):  dexamethasone     Tablet 4 milliGRAM(s) Oral every 6 hours  dronabinol 5 milliGRAM(s) Oral two times a day  enoxaparin Injectable 40 milliGRAM(s) SubCutaneous daily  levETIRAcetam 500 milliGRAM(s) Oral two times a day  LORazepam   Injectable 0.25 milliGRAM(s) IV Push every 6 hours  nystatin    Suspension 536022 Unit(s) Oral four times a day  pantoprazole    Tablet 40 milliGRAM(s) Oral before breakfast  potassium chloride    Tablet ER 20 milliEquivalent(s) Oral every 2 hours  QUEtiapine 25 milliGRAM(s) Oral at bedtime  sodium chloride 0.9%. 500 milliLiter(s) (100 mL/Hr) IV Continuous <Continuous>    MEDICATIONS  (PRN):  acetaminophen   Tablet .. 650 milliGRAM(s) Oral every 6 hours PRN Mild Pain (1 - 3)  LORazepam   Injectable 0.25 milliGRAM(s) IV Push every 4 hours PRN Nausea and/or Vomiting  meclizine 25 milliGRAM(s) Oral two times a day PRN Dizziness  melatonin 3 milliGRAM(s) Oral at bedtime PRN Insomnia  ondansetron    Tablet 4 milliGRAM(s) Oral every 6 hours PRN Nausea and/or Vomiting  polyethylene glycol 3350 17 Gram(s) Oral two times a day PRN Constipation    PRESENT SYMPTOMS: [ ]Unable to obtain due to poor mentation   Source if other than patient:  [ ]Family   [ ]Team     Pain (Impact on QOL):  No pain  Location:  Severity:  Minimal acceptable level (0-10 scale):       Quality:       Onset:  Duration:  Aggravating factors:  Relieving Factors  Radiation:    Dyspnea:  Yes [ ] No [x ] - [ ]Mild [ ]Moderate [ ]Severe  Anxiety:    Yes [ ] No [x ] - [ ]Mild [ ]Moderate [ ]Severe  Fatigue:    Yes [x ] No [ ] - [ ]Mild [ x]Moderate [ ]Severe  Nausea:    Yes [x ] No [ ] - [x ]Mild [ ]Moderate [ ]Severe                         Loss of appetite: Yes [x ] No [ ] - [x ]Mild [ ]Moderate [ ]Severe             Constipation:  Yes [ ] No [x ] - [ ]Mild [ ]Moderate [ ]Severe  Grief:  Yes [x ] No [ ]     PAIN AD Score:	  [email or web address].[email or web address]/cog/painad.pdf (Ctrl + left click to view)    Other Symptoms: +vertigo  [ x]All other review of systems negative     Karnofsky Performance Score/Palliative Performance Status Version 2:       40  %    http://palliative.info/resource_material/[email or web address].pdf    PHYSICAL EXAM:  Vital Signs Last 24 Hrs  T(C): 36.7 (23 Sep 2019 06:09), Max: 36.8 (22 Sep 2019 18:04)  T(F): 98.1 (23 Sep 2019 06:09), Max: 98.3 (22 Sep 2019 18:04)  HR: 87 (23 Sep 2019 06:09) (75 - 87)  BP: 96/57 (23 Sep 2019 06:09) (96/57 - 116/75)  BP(mean): --  RR: 16 (23 Sep 2019 06:09) (16 - 16)  SpO2: 100% (23 Sep 2019 06:09) (98% - 100%) I&O's Summary    22 Sep 2019 07:01  -  23 Sep 2019 07:00  --------------------------------------------------------  IN: 800 mL / OUT: 450 mL / NET: 350 mL    GENERAL:  [x ]Alert  [x ]Oriented x 3   [ ]Lethargic  [ ]Cachexia  [ ]Unarousable  [x ]Verbal  [ ]Non-Verbal  Behavioral:   [ ] Anxiety  [ ] Delirium [ ] Agitation [ ] Other  HEENT:  [x ]Normal   [ ]Dry mouth   [ ]ET Tube/Trach  [ ]Oral lesions  PULMONARY:   [x ]Clear  [ ]Tachypnea  [ ]Audible excessive secretions   [ ]Rhonchi        [ ]Right [ ]Left [ ]Bilateral  [ ]Crackles        [ ]Right [ ]Left [ ]Bilateral  [ ]Wheezing     [ ]Right [ ]Left [ ]Bilateral  CARDIOVASCULAR:    [x ]Regular [ ]Irregular [ ]Tachy  [ ]Bryson [ ]Murmur [ ]Other  GASTROINTESTINAL:  [x ]Soft  [ ]Distended   [x ]+BS  [x ]Non tender [ ]Tender  [ ]PEG [ ]OGT/ NGT    GENITOURINARY:  [x ]Normal [ ] Incontinent   [ ]Oliguria/Anuria   [ ]Jimenez  MUSCULOSKELETAL:   [ ]Normal   [x ]Weakness  [ ]Bed/Wheelchair bound [ ]Edema  NEUROLOGIC:   [x ]No focal deficits  [ ] Cognitive impairment  [ ] Dysphagia [ ]Dysarthria [ ] Paresis [ ]Other   SKIN:   [x ]Normal   [ ]Pressure ulcer(s)  [ ]Rash    CRITICAL CARE:  [ ] Shock Present  [ ]Septic [ ]Cardiogenic [ ]Neurologic [ ]Hypovolemic  [ ]  Vasopressors [ ]  Inotropes   [ ] Respiratory failure present  [ ] Acute  [ ] Chronic [ ] Hypoxic  [ ] Hypercarbic [ ] Other  [ ] Other organ failure     LABS:                        11.1   9.39  )-----------( 171      ( 23 Sep 2019 07:15 )             33.3     09-23    135  |  101  |  14  ----------------------------<  209<H>  2.8<LL>   |  18<L>  |  0.51    Ca    7.8<L>      23 Sep 2019 07:15  Phos  2.7     09-23  Mg     2.0     09-23    TPro  4.9<L>  /  Alb  2.9<L>  /  TBili  0.3  /  DBili  x   /  AST  10  /  ALT  44<H>  /  AlkPhos  71  09-23    RADIOLOGY & ADDITIONAL STUDIES: reviewed.     Protein Calorie Malnutrition Present: [ ] yes [ ] no  [ ] PPSV2 < or = 30%  [ ] significant weight loss [ ] poor nutritional intake [ ] anasarca [ ] catabolic state Albumin, Serum: 3.9 g/dL (09-13-19 @ 08:10)    REFERRALS:   [ ]Chaplaincy  [ ] Hospice  [ ]Child Life  [ ]Social Work  [ ]Case management [ ]Holistic Therapy   Goals of Care Document:

## 2019-09-23 NOTE — PROVIDER CONTACT NOTE (OTHER) - ACTION/TREATMENT ORDERED:
Continue to monitor for s/s of acute distress & further decrease in BP.  No further interventions at this time.

## 2019-09-23 NOTE — PROGRESS NOTE ADULT - PROBLEM SELECTOR PLAN 3
- c/w nystatin swish and swallow Per documentation, pt has lost 25 lbs over a 2 week period.   -continue low Na diet with ensure

## 2019-09-23 NOTE — PROGRESS NOTE ADULT - PROBLEM SELECTOR PLAN 4
Per documentation, pt has lost 25 lbs over a 2 week period.   -continue low Na diet with ensure DVT ppx: Lovenox 40 mg qd  Diet: low Na diet with ensurex3  Dispo: PT rec home PT  DNR/DNI

## 2019-09-24 LAB
ANION GAP SERPL CALC-SCNC: 14 MMO/L — SIGNIFICANT CHANGE UP (ref 7–14)
BUN SERPL-MCNC: 13 MG/DL — SIGNIFICANT CHANGE UP (ref 7–23)
CALCIUM SERPL-MCNC: 8.5 MG/DL — SIGNIFICANT CHANGE UP (ref 8.4–10.5)
CHLORIDE SERPL-SCNC: 102 MMOL/L — SIGNIFICANT CHANGE UP (ref 98–107)
CO2 SERPL-SCNC: 20 MMOL/L — LOW (ref 22–31)
CREAT SERPL-MCNC: 0.52 MG/DL — SIGNIFICANT CHANGE UP (ref 0.5–1.3)
GLUCOSE SERPL-MCNC: 135 MG/DL — HIGH (ref 70–99)
HBA1C BLD-MCNC: 6.7 % — HIGH (ref 4–5.6)
HCT VFR BLD CALC: 33.9 % — LOW (ref 39–50)
HGB BLD-MCNC: 11.4 G/DL — LOW (ref 13–17)
MAGNESIUM SERPL-MCNC: 2.2 MG/DL — SIGNIFICANT CHANGE UP (ref 1.6–2.6)
MCHC RBC-ENTMCNC: 28.4 PG — SIGNIFICANT CHANGE UP (ref 27–34)
MCHC RBC-ENTMCNC: 33.6 % — SIGNIFICANT CHANGE UP (ref 32–36)
MCV RBC AUTO: 84.5 FL — SIGNIFICANT CHANGE UP (ref 80–100)
NRBC # FLD: 0 K/UL — SIGNIFICANT CHANGE UP (ref 0–0)
PHOSPHATE SERPL-MCNC: 2.6 MG/DL — SIGNIFICANT CHANGE UP (ref 2.5–4.5)
PLATELET # BLD AUTO: 178 K/UL — SIGNIFICANT CHANGE UP (ref 150–400)
PMV BLD: 9 FL — SIGNIFICANT CHANGE UP (ref 7–13)
POTASSIUM SERPL-MCNC: 3.8 MMOL/L — SIGNIFICANT CHANGE UP (ref 3.5–5.3)
POTASSIUM SERPL-SCNC: 3.8 MMOL/L — SIGNIFICANT CHANGE UP (ref 3.5–5.3)
RBC # BLD: 4.01 M/UL — LOW (ref 4.2–5.8)
RBC # FLD: 12.9 % — SIGNIFICANT CHANGE UP (ref 10.3–14.5)
SODIUM SERPL-SCNC: 136 MMOL/L — SIGNIFICANT CHANGE UP (ref 135–145)
WBC # BLD: 9.16 K/UL — SIGNIFICANT CHANGE UP (ref 3.8–10.5)
WBC # FLD AUTO: 9.16 K/UL — SIGNIFICANT CHANGE UP (ref 3.8–10.5)

## 2019-09-24 PROCEDURE — 99233 SBSQ HOSP IP/OBS HIGH 50: CPT

## 2019-09-24 PROCEDURE — 99232 SBSQ HOSP IP/OBS MODERATE 35: CPT | Mod: GC

## 2019-09-24 RX ADMIN — Medication 0.25 MILLIGRAM(S): at 12:41

## 2019-09-24 RX ADMIN — Medication 500000 UNIT(S): at 23:09

## 2019-09-24 RX ADMIN — Medication 0.25 MILLIGRAM(S): at 23:09

## 2019-09-24 RX ADMIN — LEVETIRACETAM 500 MILLIGRAM(S): 250 TABLET, FILM COATED ORAL at 17:21

## 2019-09-24 RX ADMIN — QUETIAPINE FUMARATE 25 MILLIGRAM(S): 200 TABLET, FILM COATED ORAL at 23:09

## 2019-09-24 RX ADMIN — Medication 4 MILLIGRAM(S): at 17:21

## 2019-09-24 RX ADMIN — PANTOPRAZOLE SODIUM 40 MILLIGRAM(S): 20 TABLET, DELAYED RELEASE ORAL at 06:37

## 2019-09-24 RX ADMIN — Medication 3 MILLIGRAM(S): at 23:09

## 2019-09-24 RX ADMIN — Medication 0.25 MILLIGRAM(S): at 06:38

## 2019-09-24 RX ADMIN — Medication 4 MILLIGRAM(S): at 23:09

## 2019-09-24 RX ADMIN — Medication 5 MILLIGRAM(S): at 17:58

## 2019-09-24 RX ADMIN — LEVETIRACETAM 500 MILLIGRAM(S): 250 TABLET, FILM COATED ORAL at 06:37

## 2019-09-24 RX ADMIN — Medication 500000 UNIT(S): at 12:27

## 2019-09-24 RX ADMIN — Medication 1: at 13:11

## 2019-09-24 RX ADMIN — Medication 4 MILLIGRAM(S): at 12:28

## 2019-09-24 RX ADMIN — Medication 4 MILLIGRAM(S): at 06:37

## 2019-09-24 RX ADMIN — Medication 1: at 17:58

## 2019-09-24 RX ADMIN — Medication 5 MILLIGRAM(S): at 06:39

## 2019-09-24 RX ADMIN — Medication 500000 UNIT(S): at 17:21

## 2019-09-24 RX ADMIN — Medication 0.25 MILLIGRAM(S): at 17:20

## 2019-09-24 RX ADMIN — ENOXAPARIN SODIUM 40 MILLIGRAM(S): 100 INJECTION SUBCUTANEOUS at 12:27

## 2019-09-24 RX ADMIN — Medication 500000 UNIT(S): at 06:37

## 2019-09-24 NOTE — PROGRESS NOTE ADULT - ASSESSMENT
68M former heavy smoker, recent admit for worsening ataxia found to have multiple brain lesions with vasogenic edema and b/l spiculated lung nodules s/p bronchoscopy with biopsy on 8/29, pathology results consistent with NSCLC adenocarcinoma a/w worsening headache, blurry vision, and vertigo.     # Metastatic lung adenocarcinoma: with symptomatic brain mets, PDL-1 TPS 0%  - CT Chest with b/l spiculated lung nodules s/p bronch/EBUS, CT A/P with no mets, MRI brain with multiple intraparenchymal enhancing masses with associated hemorrhage and/or vasogenic edema  - Repeat CT head from 9/12 with no change in intracranial mets, no evidence of hydrocephalus  - on keppra and dexamethasone 4 mg PO q6h  - Appreciate Rad Onc eval, plan for CT simulation and radiation treatment starting 9/16/19 (30 Gy in 10 fractions) for a total of 10 treatments  - PDL-1 is negative, therefore single agent immunotherapy would not be appropriate in first line setting. Plan per Dr. Boyce was to address intracranial mets first with RT given pt's main symptoms related to brain mets and then to consider palliative chemotherapy (other molecular markers for targeted therapies also still pending).   - Pt's daughter/HCP worried about patient's limited mobility and need for assistance for any movement.  PT recc home PT, however evaluation was last week.  Recommend re-evaluation as patient states he has not gotten out of bed without help.          d/w primary team    Anastacia Perez MD  Hematology/Oncology Fellow, PGY-5  pager: [phone]  After 5pm or on weekends, please page the on-call fellow. 68M former heavy smoker, recent admit for worsening ataxia found to have multiple brain lesions with vasogenic edema and b/l spiculated lung nodules s/p bronchoscopy with biopsy on 8/29, pathology results consistent with NSCLC adenocarcinoma a/w worsening headache, blurry vision, and vertigo.     # Metastatic lung adenocarcinoma: with symptomatic brain mets, PDL-1 TPS 0%  - CT Chest with b/l spiculated lung nodules s/p bronch/EBUS, CT A/P with no mets, MRI brain with multiple intraparenchymal enhancing masses with associated hemorrhage and/or vasogenic edema  - Repeat CT head from 9/12 with no change in intracranial mets, no evidence of hydrocephalus  - on keppra and dexamethasone 4 mg PO q6h  - Appreciate Rad Onc eval, plan for CT simulation and radiation treatment starting 9/16/19 (30 Gy in 10 fractions) for a total of 10 treatments  - evidence of horizontal nystagmus on exam, possible CN 6 involvement due to leptomeningeal disease.  No evidence on MRI brain done previously.  Brain RT could treat any leptomeningeal disease.    - PDL-1 is negative, therefore single agent immunotherapy would not be appropriate in first line setting. Plan per Dr. Boyce was to address intracranial mets first with RT given pt's main symptoms related to brain mets and then to consider palliative chemotherapy (other molecular markers for targeted therapies also still pending).   - Pt's daughter/HCP worried about patient's limited mobility and need for assistance for any movement.  PT recc home PT, however evaluation was last week.  Recommend re-evaluation as patient states he has not gotten out of bed without help.        d/w primary team    Anastacia Perez MD  Hematology/Oncology Fellow, PGY-5  pager: [phone]  After 5pm or on weekends, please page the on-call fellow.

## 2019-09-24 NOTE — PROGRESS NOTE ADULT - SUBJECTIVE AND OBJECTIVE BOX
INTERVAL HPI/OVERNIGHT EVENTS:  Patient S&E at bedside.  Endorses slight headache, no pain.   Limited mobility, states he has walked "14 minutes total" since being here.   Spoke with daughter on the phone.       VITAL SIGNS:  T(F): 98.2 (09-23-19 @ 21:32)  HR: 93 (09-23-19 @ 21:32)  BP: 97/60 (09-23-19 @ 21:32)  RR: 16 (09-23-19 @ 21:32)  SpO2: 98% (09-23-19 @ 21:32)      PHYSICAL EXAM:  Constitutional: NAD  Eyes: EOMI, sclera non-icteric  Neck: supple  Respiratory: CTA b/l, good air entry b/l  Cardiovascular: RRR, no M/R/G  Gastrointestinal: soft, NTND, no masses palpable  Extremities: no c/c/e  Neurological: AAOx3      MEDICATIONS  (STANDING):  dexamethasone     Tablet 4 milliGRAM(s) Oral every 6 hours  dextrose 5%. 1000 milliLiter(s) (50 mL/Hr) IV Continuous <Continuous>  dextrose 50% Injectable 12.5 Gram(s) IV Push once  dextrose 50% Injectable 25 Gram(s) IV Push once  dextrose 50% Injectable 25 Gram(s) IV Push once  dronabinol 5 milliGRAM(s) Oral two times a day  enoxaparin Injectable 40 milliGRAM(s) SubCutaneous daily  insulin lispro (HumaLOG) corrective regimen sliding scale   SubCutaneous three times a day before meals  levETIRAcetam 500 milliGRAM(s) Oral two times a day  LORazepam   Injectable 0.25 milliGRAM(s) IV Push every 6 hours  nystatin    Suspension 429010 Unit(s) Oral four times a day  pantoprazole    Tablet 40 milliGRAM(s) Oral before breakfast  QUEtiapine 25 milliGRAM(s) Oral at bedtime  sodium chloride 0.9%. 500 milliLiter(s) (100 mL/Hr) IV Continuous <Continuous>    MEDICATIONS  (PRN):  acetaminophen   Tablet .. 650 milliGRAM(s) Oral every 6 hours PRN Mild Pain (1 - 3)  dextrose 40% Gel 15 Gram(s) Oral once PRN Blood Glucose LESS THAN 70 milliGRAM(s)/deciliter  glucagon  Injectable 1 milliGRAM(s) IntraMuscular once PRN Glucose LESS THAN 70 milligrams/deciliter  LORazepam   Injectable 0.25 milliGRAM(s) IV Push every 4 hours PRN Nausea and/or Vomiting  meclizine 25 milliGRAM(s) Oral two times a day PRN Dizziness  melatonin 3 milliGRAM(s) Oral at bedtime PRN Insomnia  ondansetron    Tablet 4 milliGRAM(s) Oral every 6 hours PRN Nausea and/or Vomiting  polyethylene glycol 3350 17 Gram(s) Oral two times a day PRN Constipation      Allergies  No Known Allergies        LABS:                        11.4   9.16  )-----------( 178      ( 24 Sep 2019 06:00 )             33.9     09-24    136  |  102  |  13  ----------------------------<  135<H>  3.8   |  20<L>  |  0.52    Ca    8.5      24 Sep 2019 06:00  Phos  2.6     09-24  Mg     2.2     09-24    TPro  4.9<L>  /  Alb  2.9<L>  /  TBili  0.3  /  DBili  x   /  AST  10  /  ALT  44<H>  /  AlkPhos  71  09-23          RADIOLOGY & ADDITIONAL TESTS:  Studies reviewed. INTERVAL HPI/OVERNIGHT EVENTS:  Patient S&E at bedside.  Endorses slight headache, no pain.   Limited mobility, states he has walked "14 minutes total" since being here.   Spoke with daughter on the phone.       VITAL SIGNS:  T(F): 98.2 (09-23-19 @ 21:32)  HR: 93 (09-23-19 @ 21:32)  BP: 97/60 (09-23-19 @ 21:32)  RR: 16 (09-23-19 @ 21:32)  SpO2: 98% (09-23-19 @ 21:32)      PHYSICAL EXAM:  Constitutional: NAD  Eyes: EOMI, sclera non-icteric, +horizontal nystagmus, left eye looking downward   Neck: supple  Respiratory: CTA b/l, good air entry b/l  Cardiovascular: RRR, no M/R/G  Gastrointestinal: soft, NTND, no masses palpable  Extremities: no c/c/e  Neurological: AAOx3      MEDICATIONS  (STANDING):  dexamethasone     Tablet 4 milliGRAM(s) Oral every 6 hours  dextrose 5%. 1000 milliLiter(s) (50 mL/Hr) IV Continuous <Continuous>  dextrose 50% Injectable 12.5 Gram(s) IV Push once  dextrose 50% Injectable 25 Gram(s) IV Push once  dextrose 50% Injectable 25 Gram(s) IV Push once  dronabinol 5 milliGRAM(s) Oral two times a day  enoxaparin Injectable 40 milliGRAM(s) SubCutaneous daily  insulin lispro (HumaLOG) corrective regimen sliding scale   SubCutaneous three times a day before meals  levETIRAcetam 500 milliGRAM(s) Oral two times a day  LORazepam   Injectable 0.25 milliGRAM(s) IV Push every 6 hours  nystatin    Suspension 873780 Unit(s) Oral four times a day  pantoprazole    Tablet 40 milliGRAM(s) Oral before breakfast  QUEtiapine 25 milliGRAM(s) Oral at bedtime  sodium chloride 0.9%. 500 milliLiter(s) (100 mL/Hr) IV Continuous <Continuous>    MEDICATIONS  (PRN):  acetaminophen   Tablet .. 650 milliGRAM(s) Oral every 6 hours PRN Mild Pain (1 - 3)  dextrose 40% Gel 15 Gram(s) Oral once PRN Blood Glucose LESS THAN 70 milliGRAM(s)/deciliter  glucagon  Injectable 1 milliGRAM(s) IntraMuscular once PRN Glucose LESS THAN 70 milligrams/deciliter  LORazepam   Injectable 0.25 milliGRAM(s) IV Push every 4 hours PRN Nausea and/or Vomiting  meclizine 25 milliGRAM(s) Oral two times a day PRN Dizziness  melatonin 3 milliGRAM(s) Oral at bedtime PRN Insomnia  ondansetron    Tablet 4 milliGRAM(s) Oral every 6 hours PRN Nausea and/or Vomiting  polyethylene glycol 3350 17 Gram(s) Oral two times a day PRN Constipation      Allergies  No Known Allergies        LABS:                        11.4   9.16  )-----------( 178      ( 24 Sep 2019 06:00 )             33.9     09-24    136  |  102  |  13  ----------------------------<  135<H>  3.8   |  20<L>  |  0.52    Ca    8.5      24 Sep 2019 06:00  Phos  2.6     09-24  Mg     2.2     09-24    TPro  4.9<L>  /  Alb  2.9<L>  /  TBili  0.3  /  DBili  x   /  AST  10  /  ALT  44<H>  /  AlkPhos  71  09-23          RADIOLOGY & ADDITIONAL TESTS:  Studies reviewed.

## 2019-09-24 NOTE — PROGRESS NOTE ADULT - PROBLEM SELECTOR PLAN 3
Per documentation, pt has lost 25 lbs over a 2 week period.   -continue low Na diet with ensure Per documentation, pt has lost 25 lbs over a 2 week period.   -continue low Na diet with ensure  -fluids for dehydration

## 2019-09-24 NOTE — PROGRESS NOTE ADULT - SUBJECTIVE AND OBJECTIVE BOX
Alexa Stringer MD  PGY-1  Pager [phone]        Patient is a 68y old  Male who presents with a chief complaint of blurry vision, HA, vertigo (24 Sep 2019 11:10)        SUBJECTIVE / OVERNIGHT EVENTS: Patient had no acute events overnight. Patient seen and examined at bedside this morning.     ROS: [ - ] Fever [ - ] Chills [ - ] Nausea/Vomiting [ - ] Chest Pain [ - ] Shortness of breath     MEDICATIONS  (STANDING):  dexamethasone     Tablet 4 milliGRAM(s) Oral every 6 hours  dextrose 5%. 1000 milliLiter(s) (50 mL/Hr) IV Continuous <Continuous>  dextrose 50% Injectable 12.5 Gram(s) IV Push once  dextrose 50% Injectable 25 Gram(s) IV Push once  dextrose 50% Injectable 25 Gram(s) IV Push once  dronabinol 5 milliGRAM(s) Oral two times a day  enoxaparin Injectable 40 milliGRAM(s) SubCutaneous daily  insulin lispro (HumaLOG) corrective regimen sliding scale   SubCutaneous three times a day before meals  levETIRAcetam 500 milliGRAM(s) Oral two times a day  LORazepam   Injectable 0.25 milliGRAM(s) IV Push every 6 hours  nystatin    Suspension 070931 Unit(s) Oral four times a day  pantoprazole    Tablet 40 milliGRAM(s) Oral before breakfast  QUEtiapine 25 milliGRAM(s) Oral at bedtime  sodium chloride 0.9%. 500 milliLiter(s) (100 mL/Hr) IV Continuous <Continuous>    MEDICATIONS  (PRN):  acetaminophen   Tablet .. 650 milliGRAM(s) Oral every 6 hours PRN Mild Pain (1 - 3)  dextrose 40% Gel 15 Gram(s) Oral once PRN Blood Glucose LESS THAN 70 milliGRAM(s)/deciliter  glucagon  Injectable 1 milliGRAM(s) IntraMuscular once PRN Glucose LESS THAN 70 milligrams/deciliter  LORazepam   Injectable 0.25 milliGRAM(s) IV Push every 4 hours PRN Nausea and/or Vomiting  meclizine 25 milliGRAM(s) Oral two times a day PRN Dizziness  melatonin 3 milliGRAM(s) Oral at bedtime PRN Insomnia  ondansetron    Tablet 4 milliGRAM(s) Oral every 6 hours PRN Nausea and/or Vomiting  polyethylene glycol 3350 17 Gram(s) Oral two times a day PRN Constipation      Vital Signs Last 24 Hrs  T(C): 36.9 (24 Sep 2019 13:11), Max: 36.9 (24 Sep 2019 13:11)  T(F): 98.4 (24 Sep 2019 13:11), Max: 98.4 (24 Sep 2019 13:11)  HR: 87 (24 Sep 2019 13:11) (87 - 93)  BP: 103/61 (24 Sep 2019 13:11) (97/60 - 103/61)  BP(mean): --  RR: 17 (24 Sep 2019 13:11) (16 - 17)  SpO2: 98% (24 Sep 2019 13:11) (98% - 98%)  CAPILLARY BLOOD GLUCOSE      POCT Blood Glucose.: 157 mg/dL (24 Sep 2019 12:56)  POCT Blood Glucose.: 120 mg/dL (24 Sep 2019 08:59)  POCT Blood Glucose.: 163 mg/dL (23 Sep 2019 22:22)  POCT Blood Glucose.: 203 mg/dL (23 Sep 2019 17:54)    I&O's Summary      PHYSICAL EXAM  GENERAL: NAD, lying comfortably in bed   Head:  Atraumatic, Normocephalic  EYES: EOMI, PERRLA, conjunctiva and sclera clear  NECK: Supple, No JVD  CHEST/LUNG: Clear to auscultation bilaterally; No wheeze  HEART: RRR, S1 and S2 No murmurs, rubs, or gallops  ABDOMEN: Soft, Nontender, Nondistended; Bowel sounds present  EXTREMITIES:  2+ Peripheral Pulses, No clubbing, cyanosis, or edema  NEURO: AAOx3, non-focal  SKIN: No rashes or lesions    LABS:                        11.4   9.16  )-----------( 178      ( 24 Sep 2019 06:00 )             33.9     09-24    136  |  102  |  13  ----------------------------<  135<H>  3.8   |  20<L>  |  0.52    Ca    8.5      24 Sep 2019 06:00  Phos  2.6     09-24  Mg     2.2     09-24    TPro  4.9<L>  /  Alb  2.9<L>  /  TBili  0.3  /  DBili  x   /  AST  10  /  ALT  44<H>  /  AlkPhos  71  09-23                RADIOLOGY & ADDITIONAL TESTS:    Imaging Personally Reviewed:  Consultant(s) Notes Reviewed: Alexa Stringer MD  PGY-1  Pager [phone]        Patient is a 68y old  Male who presents with a chief complaint of blurry vision, HA, vertigo (24 Sep 2019 11:10)        SUBJECTIVE / OVERNIGHT EVENTS: Patient had no acute events overnight. Patient seen and examined at bedside this morning. Weakness, blurry vision unchanged. Appetite has not improved and endorses nausea    ROS: [ - ] Fever [ - ] Chills [ - ] Vomiting [ - ] Chest Pain [ - ] Shortness of breath     MEDICATIONS  (STANDING):  dexamethasone     Tablet 4 milliGRAM(s) Oral every 6 hours  dextrose 5%. 1000 milliLiter(s) (50 mL/Hr) IV Continuous <Continuous>  dextrose 50% Injectable 12.5 Gram(s) IV Push once  dextrose 50% Injectable 25 Gram(s) IV Push once  dextrose 50% Injectable 25 Gram(s) IV Push once  dronabinol 5 milliGRAM(s) Oral two times a day  enoxaparin Injectable 40 milliGRAM(s) SubCutaneous daily  insulin lispro (HumaLOG) corrective regimen sliding scale   SubCutaneous three times a day before meals  levETIRAcetam 500 milliGRAM(s) Oral two times a day  LORazepam   Injectable 0.25 milliGRAM(s) IV Push every 6 hours  nystatin    Suspension 941702 Unit(s) Oral four times a day  pantoprazole    Tablet 40 milliGRAM(s) Oral before breakfast  QUEtiapine 25 milliGRAM(s) Oral at bedtime  sodium chloride 0.9%. 500 milliLiter(s) (100 mL/Hr) IV Continuous <Continuous>    MEDICATIONS  (PRN):  acetaminophen   Tablet .. 650 milliGRAM(s) Oral every 6 hours PRN Mild Pain (1 - 3)  dextrose 40% Gel 15 Gram(s) Oral once PRN Blood Glucose LESS THAN 70 milliGRAM(s)/deciliter  glucagon  Injectable 1 milliGRAM(s) IntraMuscular once PRN Glucose LESS THAN 70 milligrams/deciliter  LORazepam   Injectable 0.25 milliGRAM(s) IV Push every 4 hours PRN Nausea and/or Vomiting  meclizine 25 milliGRAM(s) Oral two times a day PRN Dizziness  melatonin 3 milliGRAM(s) Oral at bedtime PRN Insomnia  ondansetron    Tablet 4 milliGRAM(s) Oral every 6 hours PRN Nausea and/or Vomiting  polyethylene glycol 3350 17 Gram(s) Oral two times a day PRN Constipation      Vital Signs Last 24 Hrs  T(C): 36.9 (24 Sep 2019 13:11), Max: 36.9 (24 Sep 2019 13:11)  T(F): 98.4 (24 Sep 2019 13:11), Max: 98.4 (24 Sep 2019 13:11)  HR: 87 (24 Sep 2019 13:11) (87 - 93)  BP: 103/61 (24 Sep 2019 13:11) (97/60 - 103/61)  BP(mean): --  RR: 17 (24 Sep 2019 13:11) (16 - 17)  SpO2: 98% (24 Sep 2019 13:11) (98% - 98%)  CAPILLARY BLOOD GLUCOSE      POCT Blood Glucose.: 157 mg/dL (24 Sep 2019 12:56)  POCT Blood Glucose.: 120 mg/dL (24 Sep 2019 08:59)  POCT Blood Glucose.: 163 mg/dL (23 Sep 2019 22:22)  POCT Blood Glucose.: 203 mg/dL (23 Sep 2019 17:54)    I&O's Summary      PHYSICAL EXAM  GENERAL: NAD, lying comfortably in bed, cachetic  Head:  Atraumatic, Normocephalic  CHEST/LUNG: Clear to auscultation bilaterally; No wheeze  HEART: RRR, S1 and S2 No murmurs, rubs, or gallops  ABDOMEN: Soft, Nontender, Nondistended; Bowel sounds present  EXTREMITIES:  2+ Peripheral Pulses, No clubbing, cyanosis, or edema  NEURO: AAOx3, non-focal  SKIN: No rashes or lesions    LABS:                        11.4   9.16  )-----------( 178      ( 24 Sep 2019 06:00 )             33.9     09-24    136  |  102  |  13  ----------------------------<  135<H>  3.8   |  20<L>  |  0.52    Ca    8.5      24 Sep 2019 06:00  Phos  2.6     09-24  Mg     2.2     09-24    TPro  4.9<L>  /  Alb  2.9<L>  /  TBili  0.3  /  DBili  x   /  AST  10  /  ALT  44<H>  /  AlkPhos  71  09-23

## 2019-09-24 NOTE — PROGRESS NOTE ADULT - PROBLEM SELECTOR PLAN 1
2/2 NSCLC  -[facility]H redemonstrated multiple intracranial metastases. No hydrocephalus.  - Neurosurgery: not candidate for surgery  - oncology, rad onc and palliative care recs appreciated  - c/w decadron 4 mg q6 hr.  - c/w Keppra 500 mg bid for seizure prophylaxis  - c/w zofran and meclizine for n/v and vertigo management  - dilaudid 0.5mg q4h for HA  - fall precautions  -radiation therapy treatment 4 (total 10). Will resume on Monday 2/2 NSCLC  -[facility] redemonstrated multiple intracranial metastases. No hydrocephalus.  - Neurosurgery: not candidate for surgery  - oncology, rad onc and palliative care recs appreciated  - c/w decadron 4 mg q6 hr.  - c/w Keppra 500 mg bid for seizure prophylaxis  - c/w zofran and meclizine for n/v and vertigo management  - dilaudid 0.5mg q4h for HA  - fall precautions  -radiation therapy treatment 6 (total 10)

## 2019-09-25 PROCEDURE — 99232 SBSQ HOSP IP/OBS MODERATE 35: CPT

## 2019-09-25 RX ORDER — SODIUM CHLORIDE 9 MG/ML
500 INJECTION, SOLUTION INTRAVENOUS ONCE
Refills: 0 | Status: COMPLETED | OUTPATIENT
Start: 2019-09-25 | End: 2019-09-25

## 2019-09-25 RX ORDER — SODIUM CHLORIDE 9 MG/ML
1000 INJECTION, SOLUTION INTRAVENOUS
Refills: 0 | Status: DISCONTINUED | OUTPATIENT
Start: 2019-09-25 | End: 2019-09-30

## 2019-09-25 RX ADMIN — LEVETIRACETAM 500 MILLIGRAM(S): 250 TABLET, FILM COATED ORAL at 17:24

## 2019-09-25 RX ADMIN — Medication 500000 UNIT(S): at 12:31

## 2019-09-25 RX ADMIN — Medication 1: at 19:11

## 2019-09-25 RX ADMIN — Medication 1: at 12:40

## 2019-09-25 RX ADMIN — LEVETIRACETAM 500 MILLIGRAM(S): 250 TABLET, FILM COATED ORAL at 06:52

## 2019-09-25 RX ADMIN — SODIUM CHLORIDE 500 MILLILITER(S): 9 INJECTION, SOLUTION INTRAVENOUS at 09:49

## 2019-09-25 RX ADMIN — Medication 4 MILLIGRAM(S): at 17:24

## 2019-09-25 RX ADMIN — Medication 0.25 MILLIGRAM(S): at 06:51

## 2019-09-25 RX ADMIN — QUETIAPINE FUMARATE 25 MILLIGRAM(S): 200 TABLET, FILM COATED ORAL at 23:11

## 2019-09-25 RX ADMIN — Medication 500000 UNIT(S): at 06:52

## 2019-09-25 RX ADMIN — Medication 0.25 MILLIGRAM(S): at 23:12

## 2019-09-25 RX ADMIN — Medication 500000 UNIT(S): at 17:25

## 2019-09-25 RX ADMIN — Medication 0.25 MILLIGRAM(S): at 17:31

## 2019-09-25 RX ADMIN — Medication 5 MILLIGRAM(S): at 17:31

## 2019-09-25 RX ADMIN — Medication 0.25 MILLIGRAM(S): at 23:10

## 2019-09-25 RX ADMIN — Medication 500000 UNIT(S): at 23:11

## 2019-09-25 RX ADMIN — Medication 4 MILLIGRAM(S): at 06:52

## 2019-09-25 RX ADMIN — Medication 5 MILLIGRAM(S): at 06:59

## 2019-09-25 RX ADMIN — Medication 4 MILLIGRAM(S): at 23:11

## 2019-09-25 RX ADMIN — Medication 0.25 MILLIGRAM(S): at 12:31

## 2019-09-25 RX ADMIN — Medication 4 MILLIGRAM(S): at 12:31

## 2019-09-25 RX ADMIN — PANTOPRAZOLE SODIUM 40 MILLIGRAM(S): 20 TABLET, DELAYED RELEASE ORAL at 06:53

## 2019-09-25 RX ADMIN — Medication 1: at 09:38

## 2019-09-25 RX ADMIN — SODIUM CHLORIDE 100 MILLILITER(S): 9 INJECTION, SOLUTION INTRAVENOUS at 10:56

## 2019-09-25 RX ADMIN — ENOXAPARIN SODIUM 40 MILLIGRAM(S): 100 INJECTION SUBCUTANEOUS at 12:32

## 2019-09-25 NOTE — PROGRESS NOTE ADULT - PROBLEM SELECTOR PLAN 1
2/2 NSCLC  -CTH redemonstrated multiple intracranial metastases. No hydrocephalus.  - Neurosurgery: not candidate for surgery  - oncology, rad onc and palliative care recs appreciated  - c/w decadron 4 mg q6 hr.  - c/w Keppra 500 mg bid for seizure prophylaxis  - c/w zofran and meclizine for n/v and vertigo management  - dilaudid 0.5mg q4h for HA  - fall precautions  -radiation therapy treatment 7 (total 10)

## 2019-09-25 NOTE — PROGRESS NOTE ADULT - SUBJECTIVE AND OBJECTIVE BOX
Alexa Stringer MD  PGY-1  Pager [phone]        Patient is a 68y old  Male who presents with a chief complaint of blurry vision, HA, vertigo (24 Sep 2019 14:59)        SUBJECTIVE / OVERNIGHT EVENTS: Patient had no acute events overnight. Patient seen and examined at bedside this morning. Blurry vision, weakness unchanged. Denies nausea    ROS: [ - ] Fever [ - ] Chills [ - ] Nausea/Vomiting [ - ] Chest Pain [ - ] Shortness of breath     MEDICATIONS  (STANDING):  dexamethasone     Tablet 4 milliGRAM(s) Oral every 6 hours  dextrose 5%. 1000 milliLiter(s) (50 mL/Hr) IV Continuous <Continuous>  dextrose 50% Injectable 12.5 Gram(s) IV Push once  dextrose 50% Injectable 25 Gram(s) IV Push once  dextrose 50% Injectable 25 Gram(s) IV Push once  dronabinol 5 milliGRAM(s) Oral two times a day  enoxaparin Injectable 40 milliGRAM(s) SubCutaneous daily  insulin lispro (HumaLOG) corrective regimen sliding scale   SubCutaneous three times a day before meals  levETIRAcetam 500 milliGRAM(s) Oral two times a day  LORazepam   Injectable 0.25 milliGRAM(s) IV Push every 6 hours  nystatin    Suspension 672247 Unit(s) Oral four times a day  pantoprazole    Tablet 40 milliGRAM(s) Oral before breakfast  QUEtiapine 25 milliGRAM(s) Oral at bedtime  sodium chloride 0.9%. 500 milliLiter(s) (100 mL/Hr) IV Continuous <Continuous>    MEDICATIONS  (PRN):  acetaminophen   Tablet .. 650 milliGRAM(s) Oral every 6 hours PRN Mild Pain (1 - 3)  dextrose 40% Gel 15 Gram(s) Oral once PRN Blood Glucose LESS THAN 70 milliGRAM(s)/deciliter  glucagon  Injectable 1 milliGRAM(s) IntraMuscular once PRN Glucose LESS THAN 70 milligrams/deciliter  LORazepam   Injectable 0.25 milliGRAM(s) IV Push every 4 hours PRN Nausea and/or Vomiting  meclizine 25 milliGRAM(s) Oral two times a day PRN Dizziness  melatonin 3 milliGRAM(s) Oral at bedtime PRN Insomnia  ondansetron    Tablet 4 milliGRAM(s) Oral every 6 hours PRN Nausea and/or Vomiting  polyethylene glycol 3350 17 Gram(s) Oral two times a day PRN Constipation      Vital Signs Last 24 Hrs  T(C): 36.4 (25 Sep 2019 06:50), Max: 36.9 (24 Sep 2019 13:11)  T(F): 97.6 (25 Sep 2019 06:50), Max: 98.4 (24 Sep 2019 13:11)  HR: 78 (25 Sep 2019 06:50) (78 - 90)  BP: 108/65 (25 Sep 2019 06:50) (102/58 - 108/65)  BP(mean): --  RR: 15 (25 Sep 2019 06:50) (15 - 17)  SpO2: 98% (25 Sep 2019 06:50) (98% - 98%)  CAPILLARY BLOOD GLUCOSE      POCT Blood Glucose.: 171 mg/dL (24 Sep 2019 22:52)  POCT Blood Glucose.: 168 mg/dL (24 Sep 2019 17:55)  POCT Blood Glucose.: 157 mg/dL (24 Sep 2019 12:56)  POCT Blood Glucose.: 120 mg/dL (24 Sep 2019 08:59)    I&O's Summary    24 Sep 2019 07:01  -  25 Sep 2019 07:00  --------------------------------------------------------  IN: 1200 mL / OUT: 850 mL / NET: 350 mL        PHYSICAL EXAM  GENERAL: NAD, lying comfortably in bed, cachetic  Head:  Atraumatic, Normocephalic  CHEST/LUNG: Clear to auscultation bilaterally; No wheeze  HEART: RRR, S1 and S2 No murmurs, rubs, or gallops  ABDOMEN: Soft, Nontender, Nondistended; Bowel sounds present  EXTREMITIES:  2+ Peripheral Pulses, No clubbing, cyanosis, or edema  NEURO: AAOx3, non-focal  SKIN: No rashes or lesions  LABS:                        11.4   9.16  )-----------( 178      ( 24 Sep 2019 06:00 )             33.9     09-24    136  |  102  |  13  ----------------------------<  135<H>  3.8   |  20<L>  |  0.52    Ca    8.5      24 Sep 2019 06:00  Phos  2.6     09-24  Mg     2.2     09-24

## 2019-09-25 NOTE — PROVIDER CONTACT NOTE (OTHER) - SITUATION
Patient has low BP 87/61, patient c/o mild dizziness, no other complaints, MD made aware, IVF given as ordered will continue to monitor
Pt. found to have BP of 97/60.

## 2019-09-25 NOTE — PROGRESS NOTE ADULT - PROBLEM SELECTOR PLAN 3
Per documentation, pt has lost 25 lbs over a 2 week period.   -continue low Na diet with ensure  -fluids for dehydration

## 2019-09-26 ENCOUNTER — TRANSCRIPTION ENCOUNTER (OUTPATIENT)
Age: 69
End: 2019-09-26

## 2019-09-26 PROCEDURE — 99233 SBSQ HOSP IP/OBS HIGH 50: CPT

## 2019-09-26 RX ORDER — DRONABINOL 2.5 MG
5 CAPSULE ORAL
Refills: 0 | Status: DISCONTINUED | OUTPATIENT
Start: 2019-09-26 | End: 2019-09-30

## 2019-09-26 RX ORDER — ENOXAPARIN SODIUM 100 MG/ML
40 INJECTION SUBCUTANEOUS DAILY
Refills: 0 | Status: DISCONTINUED | OUTPATIENT
Start: 2019-09-26 | End: 2019-09-30

## 2019-09-26 RX ADMIN — Medication 4 MILLIGRAM(S): at 05:53

## 2019-09-26 RX ADMIN — Medication 500000 UNIT(S): at 17:21

## 2019-09-26 RX ADMIN — Medication 5 MILLIGRAM(S): at 06:07

## 2019-09-26 RX ADMIN — Medication 0.25 MILLIGRAM(S): at 19:07

## 2019-09-26 RX ADMIN — Medication 1: at 18:50

## 2019-09-26 RX ADMIN — Medication 4 MILLIGRAM(S): at 17:21

## 2019-09-26 RX ADMIN — LEVETIRACETAM 500 MILLIGRAM(S): 250 TABLET, FILM COATED ORAL at 17:21

## 2019-09-26 RX ADMIN — Medication 2: at 13:34

## 2019-09-26 RX ADMIN — LEVETIRACETAM 500 MILLIGRAM(S): 250 TABLET, FILM COATED ORAL at 05:53

## 2019-09-26 RX ADMIN — Medication 4 MILLIGRAM(S): at 12:24

## 2019-09-26 RX ADMIN — PANTOPRAZOLE SODIUM 40 MILLIGRAM(S): 20 TABLET, DELAYED RELEASE ORAL at 05:53

## 2019-09-26 RX ADMIN — ENOXAPARIN SODIUM 40 MILLIGRAM(S): 100 INJECTION SUBCUTANEOUS at 12:25

## 2019-09-26 RX ADMIN — Medication 500000 UNIT(S): at 12:24

## 2019-09-26 RX ADMIN — Medication 1: at 10:46

## 2019-09-26 RX ADMIN — Medication 0.25 MILLIGRAM(S): at 12:24

## 2019-09-26 RX ADMIN — SODIUM CHLORIDE 100 MILLILITER(S): 9 INJECTION, SOLUTION INTRAVENOUS at 14:40

## 2019-09-26 RX ADMIN — Medication 0.25 MILLIGRAM(S): at 06:07

## 2019-09-26 RX ADMIN — Medication 5 MILLIGRAM(S): at 18:57

## 2019-09-26 RX ADMIN — Medication 500000 UNIT(S): at 05:53

## 2019-09-26 NOTE — PROGRESS NOTE ADULT - SUBJECTIVE AND OBJECTIVE BOX
INTERVAL HPI/OVERNIGHT EVENTS:  Today pt indicates that nausea/vomiting and vertigo worsened.   Feels weak, not able to walk much.  Plan is to continue with RT.     Code Status: DNR / DNI  Allergies    No Known Allergies    Intolerances    MEDICATIONS  (STANDING):  dexamethasone     Tablet 4 milliGRAM(s) Oral every 6 hours  dronabinol 5 milliGRAM(s) Oral two times a day  enoxaparin Injectable 40 milliGRAM(s) SubCutaneous daily  levETIRAcetam 500 milliGRAM(s) Oral two times a day  LORazepam   Injectable 0.25 milliGRAM(s) IV Push every 6 hours  nystatin    Suspension 213301 Unit(s) Oral four times a day  pantoprazole    Tablet 40 milliGRAM(s) Oral before breakfast  potassium chloride    Tablet ER 20 milliEquivalent(s) Oral every 2 hours  QUEtiapine 25 milliGRAM(s) Oral at bedtime  sodium chloride 0.9%. 500 milliLiter(s) (100 mL/Hr) IV Continuous <Continuous>    MEDICATIONS  (PRN):  acetaminophen   Tablet .. 650 milliGRAM(s) Oral every 6 hours PRN Mild Pain (1 - 3)  LORazepam   Injectable 0.25 milliGRAM(s) IV Push every 4 hours PRN Nausea and/or Vomiting  meclizine 25 milliGRAM(s) Oral two times a day PRN Dizziness  melatonin 3 milliGRAM(s) Oral at bedtime PRN Insomnia  ondansetron    Tablet 4 milliGRAM(s) Oral every 6 hours PRN Nausea and/or Vomiting  polyethylene glycol 3350 17 Gram(s) Oral two times a day PRN Constipation    PRESENT SYMPTOMS: [ ]Unable to obtain due to poor mentation   Source if other than patient:  [ ]Family   [ ]Team     Pain (Impact on QOL):  No pain  Location:  Severity:  Minimal acceptable level (0-10 scale):       Quality:       Onset:  Duration:  Aggravating factors:  Relieving Factors  Radiation:    Dyspnea:  Yes [ ] No [x ] - [ ]Mild [ ]Moderate [ ]Severe  Anxiety:    Yes [ ] No [x ] - [ ]Mild [ ]Moderate [ ]Severe  Fatigue:    Yes [x ] No [ ] - [ ]Mild [ x]Moderate [ ]Severe  Nausea:    Yes [x ] No [ ] - [x ]Mild [ ]Moderate [ ]Severe                         Loss of appetite: Yes [x ] No [ ] - [x ]Mild [ ]Moderate [ ]Severe             Constipation:  Yes [ ] No [x ] - [ ]Mild [ ]Moderate [ ]Severe  Grief:  Yes [x ] No [ ]     PAIN AD Score:	  [email or web address] (Ctrl + left click to view)    Other Symptoms: +vertigo  [ x]All other review of systems negative     Karnofsky Performance Score/Palliative Performance Status Version 2:       40  %    http://palliative.info/resource_material/[email or web address].pdf    PHYSICAL EXAM:  Vital Signs Last 24 Hrs  T(C): 36.7 (23 Sep 2019 06:09), Max: 36.8 (22 Sep 2019 18:04)  T(F): 98.1 (23 Sep 2019 06:09), Max: 98.3 (22 Sep 2019 18:04)  HR: 87 (23 Sep 2019 06:09) (75 - 87)  BP: 96/57 (23 Sep 2019 06:09) (96/57 - 116/75)  BP(mean): --  RR: 16 (23 Sep 2019 06:09) (16 - 16)  SpO2: 100% (23 Sep 2019 06:09) (98% - 100%) I&O's Summary    22 Sep 2019 07:01  -  23 Sep 2019 07:00  --------------------------------------------------------  IN: 800 mL / OUT: 450 mL / NET: 350 mL    GENERAL:  [x ]Alert  [x ]Oriented x 3   [ ]Lethargic  [ ]Cachexia  [ ]Unarousable  [x ]Verbal  [ ]Non-Verbal  Behavioral:   [ ] Anxiety  [ ] Delirium [ ] Agitation [ ] Other  HEENT:  [x ]Normal   [ ]Dry mouth   [ ]ET Tube/Trach  [ ]Oral lesions  PULMONARY:   [x ]Clear  [ ]Tachypnea  [ ]Audible excessive secretions   [ ]Rhonchi        [ ]Right [ ]Left [ ]Bilateral  [ ]Crackles        [ ]Right [ ]Left [ ]Bilateral  [ ]Wheezing     [ ]Right [ ]Left [ ]Bilateral  CARDIOVASCULAR:    [x ]Regular [ ]Irregular [ ]Tachy  [ ]Bryson [ ]Murmur [ ]Other  GASTROINTESTINAL:  [x ]Soft  [ ]Distended   [x ]+BS  [x ]Non tender [ ]Tender  [ ]PEG [ ]OGT/ NGT    GENITOURINARY:  [x ]Normal [ ] Incontinent   [ ]Oliguria/Anuria   [ ]Jimenez  MUSCULOSKELETAL:   [ ]Normal   [x ]Weakness  [ ]Bed/Wheelchair bound [ ]Edema  NEUROLOGIC:   [x ]No focal deficits  [ ] Cognitive impairment  [ ] Dysphagia [ ]Dysarthria [ ] Paresis [ ]Other   SKIN:   [x ]Normal   [ ]Pressure ulcer(s)  [ ]Rash    CRITICAL CARE:  [ ] Shock Present  [ ]Septic [ ]Cardiogenic [ ]Neurologic [ ]Hypovolemic  [ ]  Vasopressors [ ]  Inotropes   [ ] Respiratory failure present  [ ] Acute  [ ] Chronic [ ] Hypoxic  [ ] Hypercarbic [ ] Other  [ ] Other organ failure     LABS:                        11.1   9.39  )-----------( 171      ( 23 Sep 2019 07:15 )             33.3     09-23    135  |  101  |  14  ----------------------------<  209<H>  2.8<LL>   |  18<L>  |  0.51    Ca    7.8<L>      23 Sep 2019 07:15  Phos  2.7     09-23  Mg     2.0     09-23    TPro  4.9<L>  /  Alb  2.9<L>  /  TBili  0.3  /  DBili  x   /  AST  10  /  ALT  44<H>  /  AlkPhos  71  09-23    RADIOLOGY & ADDITIONAL STUDIES: reviewed.     Protein Calorie Malnutrition Present: [ ] yes [ ] no  [ ] PPSV2 < or = 30%  [ ] significant weight loss [ ] poor nutritional intake [ ] anasarca [ ] catabolic state Albumin, Serum: 3.9 g/dL (09-13-19 @ 08:10)    REFERRALS:   [ ]Chaplaincy  [ ] Hospice  [ ]Child Life  [ ]Social Work  [ ]Case management [ ]Holistic Therapy   Goals of Care Document:

## 2019-09-26 NOTE — PROGRESS NOTE ADULT - SUBJECTIVE AND OBJECTIVE BOX
Alexa Stringer MD  PGY-1  Pager [phone]  Pager [phone]      Patient is a 68y old  Male who presents with a chief complaint of blurry vision, HA, vertigo (25 Sep 2019 08:13)        SUBJECTIVE / OVERNIGHT EVENTS: Patient had no acute events overnight. Patient seen and examined at bedside this morning.     ROS: [ - ] Fever [ - ] Chills [ - ] Nausea/Vomiting [ - ] Chest Pain [ - ] Shortness of breath     MEDICATIONS  (STANDING):  dexamethasone     Tablet 4 milliGRAM(s) Oral every 6 hours  dextrose 5%. 1000 milliLiter(s) (50 mL/Hr) IV Continuous <Continuous>  dextrose 50% Injectable 12.5 Gram(s) IV Push once  dextrose 50% Injectable 25 Gram(s) IV Push once  dextrose 50% Injectable 25 Gram(s) IV Push once  dronabinol 5 milliGRAM(s) Oral two times a day  enoxaparin Injectable 40 milliGRAM(s) SubCutaneous daily  insulin lispro (HumaLOG) corrective regimen sliding scale   SubCutaneous three times a day before meals  lactated ringers. 1000 milliLiter(s) (100 mL/Hr) IV Continuous <Continuous>  levETIRAcetam 500 milliGRAM(s) Oral two times a day  LORazepam   Injectable 0.25 milliGRAM(s) IV Push every 6 hours  nystatin    Suspension 571772 Unit(s) Oral four times a day  pantoprazole    Tablet 40 milliGRAM(s) Oral before breakfast  QUEtiapine 25 milliGRAM(s) Oral at bedtime  sodium chloride 0.9%. 500 milliLiter(s) (100 mL/Hr) IV Continuous <Continuous>    MEDICATIONS  (PRN):  acetaminophen   Tablet .. 650 milliGRAM(s) Oral every 6 hours PRN Mild Pain (1 - 3)  dextrose 40% Gel 15 Gram(s) Oral once PRN Blood Glucose LESS THAN 70 milliGRAM(s)/deciliter  glucagon  Injectable 1 milliGRAM(s) IntraMuscular once PRN Glucose LESS THAN 70 milligrams/deciliter  LORazepam   Injectable 0.25 milliGRAM(s) IV Push every 4 hours PRN Nausea and/or Vomiting  meclizine 25 milliGRAM(s) Oral two times a day PRN Dizziness  melatonin 3 milliGRAM(s) Oral at bedtime PRN Insomnia  ondansetron    Tablet 4 milliGRAM(s) Oral every 6 hours PRN Nausea and/or Vomiting  polyethylene glycol 3350 17 Gram(s) Oral two times a day PRN Constipation      Vital Signs Last 24 Hrs  T(C): 36.6 (26 Sep 2019 05:49), Max: 36.7 (25 Sep 2019 09:31)  T(F): 97.8 (26 Sep 2019 05:49), Max: 98.1 (25 Sep 2019 17:23)  HR: 78 (26 Sep 2019 05:49) (78 - 89)  BP: 129/70 (26 Sep 2019 05:49) (87/61 - 129/70)  BP(mean): --  RR: 18 (26 Sep 2019 05:49) (16 - 18)  SpO2: 99% (26 Sep 2019 05:49) (98% - 100%)  CAPILLARY BLOOD GLUCOSE      POCT Blood Glucose.: 175 mg/dL (25 Sep 2019 22:05)  POCT Blood Glucose.: 192 mg/dL (25 Sep 2019 18:24)  POCT Blood Glucose.: 158 mg/dL (25 Sep 2019 12:31)  POCT Blood Glucose.: 199 mg/dL (25 Sep 2019 08:59)    I&O's Summary    25 Sep 2019 07:01  -  26 Sep 2019 07:00  --------------------------------------------------------  IN: 0 mL / OUT: 750 mL / NET: -750 mL        PHYSICAL EXAM  GENERAL: NAD, lying comfortably in bed   HEENT:  Atraumatic, Normocephalic, EOMI, conjunctiva and sclera clear, no LAD  CHEST/LUNG: Clear to auscultation bilaterally; No wheeze  HEART: RRR, S1 and S2 No murmurs, rubs, or gallops  ABDOMEN: Soft, Nontender, Nondistended; Bowel sounds present  EXTREMITIES:  2+ Peripheral Pulses, No clubbing, cyanosis, or edema  NEURO: AAOx3, non-focal  SKIN: No rashes or lesions    LABS:                      RADIOLOGY & ADDITIONAL TESTS:    Imaging Personally Reviewed:  Consultant(s) Notes Reviewed: Alexa Stringer MD  PGY-1  Pager [phone]  Pager [phone]      Patient is a 68y old  Male who presents with a chief complaint of blurry vision, HA, vertigo (25 Sep 2019 08:13)        SUBJECTIVE / OVERNIGHT EVENTS: Patient had no acute events overnight. Patient seen and examined at bedside this morning.     ROS: [ - ] Fever [ - ] Chills [ - ] Nausea/Vomiting [ - ] Chest Pain [ - ] Shortness of breath     MEDICATIONS  (STANDING):  dexamethasone     Tablet 4 milliGRAM(s) Oral every 6 hours  dextrose 5%. 1000 milliLiter(s) (50 mL/Hr) IV Continuous <Continuous>  dextrose 50% Injectable 12.5 Gram(s) IV Push once  dextrose 50% Injectable 25 Gram(s) IV Push once  dextrose 50% Injectable 25 Gram(s) IV Push once  dronabinol 5 milliGRAM(s) Oral two times a day  enoxaparin Injectable 40 milliGRAM(s) SubCutaneous daily  insulin lispro (HumaLOG) corrective regimen sliding scale   SubCutaneous three times a day before meals  lactated ringers. 1000 milliLiter(s) (100 mL/Hr) IV Continuous <Continuous>  levETIRAcetam 500 milliGRAM(s) Oral two times a day  LORazepam   Injectable 0.25 milliGRAM(s) IV Push every 6 hours  nystatin    Suspension 314052 Unit(s) Oral four times a day  pantoprazole    Tablet 40 milliGRAM(s) Oral before breakfast  QUEtiapine 25 milliGRAM(s) Oral at bedtime  sodium chloride 0.9%. 500 milliLiter(s) (100 mL/Hr) IV Continuous <Continuous>    MEDICATIONS  (PRN):  acetaminophen   Tablet .. 650 milliGRAM(s) Oral every 6 hours PRN Mild Pain (1 - 3)  dextrose 40% Gel 15 Gram(s) Oral once PRN Blood Glucose LESS THAN 70 milliGRAM(s)/deciliter  glucagon  Injectable 1 milliGRAM(s) IntraMuscular once PRN Glucose LESS THAN 70 milligrams/deciliter  LORazepam   Injectable 0.25 milliGRAM(s) IV Push every 4 hours PRN Nausea and/or Vomiting  meclizine 25 milliGRAM(s) Oral two times a day PRN Dizziness  melatonin 3 milliGRAM(s) Oral at bedtime PRN Insomnia  ondansetron    Tablet 4 milliGRAM(s) Oral every 6 hours PRN Nausea and/or Vomiting  polyethylene glycol 3350 17 Gram(s) Oral two times a day PRN Constipation      Vital Signs Last 24 Hrs  T(C): 36.6 (26 Sep 2019 05:49), Max: 36.7 (25 Sep 2019 09:31)  T(F): 97.8 (26 Sep 2019 05:49), Max: 98.1 (25 Sep 2019 17:23)  HR: 78 (26 Sep 2019 05:49) (78 - 89)  BP: 129/70 (26 Sep 2019 05:49) (87/61 - 129/70)  BP(mean): --  RR: 18 (26 Sep 2019 05:49) (16 - 18)  SpO2: 99% (26 Sep 2019 05:49) (98% - 100%)  CAPILLARY BLOOD GLUCOSE      POCT Blood Glucose.: 175 mg/dL (25 Sep 2019 22:05)  POCT Blood Glucose.: 192 mg/dL (25 Sep 2019 18:24)  POCT Blood Glucose.: 158 mg/dL (25 Sep 2019 12:31)  POCT Blood Glucose.: 199 mg/dL (25 Sep 2019 08:59)    I&O's Summary    25 Sep 2019 07:01  -  26 Sep 2019 07:00  --------------------------------------------------------  IN: 0 mL / OUT: 750 mL / NET: -750 mL        PHYSICAL EXAM  GENERAL: NAD, lying comfortably in bed, cachetic  Head:  Atraumatic, Normocephalic  CHEST/LUNG: Clear to auscultation bilaterally; No wheeze  HEART: RRR, S1 and S2 No murmurs, rubs, or gallops  ABDOMEN: Soft, Nontender, Nondistended; Bowel sounds present  EXTREMITIES:  2+ Peripheral Pulses, No clubbing, cyanosis, or edema  NEURO: AAOx3, non-focal  SKIN: No rashes or lesions    LABS:                      RADIOLOGY & ADDITIONAL TESTS:    Imaging Personally Reviewed:  Consultant(s) Notes Reviewed:

## 2019-09-26 NOTE — PROGRESS NOTE ADULT - PROBLEM SELECTOR PLAN 1
2/2 NSCLC  -CTH redemonstrated multiple intracranial metastases. No hydrocephalus.  - Neurosurgery: not candidate for surgery  - oncology, rad onc and palliative care recs appreciated  - c/w decadron 4 mg q6 hr.  - c/w Keppra 500 mg bid for seizure prophylaxis  - c/w zofran and meclizine for n/v and vertigo management  - dilaudid 0.5mg q4h for HA  - fall precautions  -radiation therapy treatment 7 (total 10) 2/2 NSCLC  -CTH undemonstrated multiple intracranial metastases. No hydrocephalus.  - Neurosurgery: not candidate for surgery  - oncology, rad onc and palliative care recs appreciated  - c/w decadron 4 mg q6 hr.  - c/w Keppra 500 mg bid for seizure prophylaxis  - c/w zofran and meclizine for n/v and vertigo management  - dilaudid 0.5mg q4h for HA  - fall precautions  -radiation therapy treatment 7 (total 10). Last tx Mon Sept 30

## 2019-09-26 NOTE — PROGRESS NOTE ADULT - PROBLEM SELECTOR PLAN 5
Pt is DNR/DNI. MOLST in the chart DNR / DNI / Limited medical interventions / No feeding tube/ trial of IV fluids/ Use antibiotics. He has good insight about his poor prognosis. Emotional support was provided.  Had discussion with pts daughter, given fluctuating MS and symptoms recommended home with hospice services, daughter is in agreement not to pursue further DMT for now. Agreeable to discuss further with hospice, hospice referral made.

## 2019-09-26 NOTE — PROGRESS NOTE ADULT - PROBLEM SELECTOR PLAN 1
Now tolerating PO. Will change IV medications to PO in anticipation of d/c. D/c IV Ativan and continue with PO 0.25mg q6hr atc. with Ativan 0.25mg IV q3h PRN for breakthrough vomiting. Pt encouraged to ask for PRNs in case of nausea.   Pt already on steroids.

## 2019-09-27 LAB
ANION GAP SERPL CALC-SCNC: 16 MMO/L — HIGH (ref 7–14)
BUN SERPL-MCNC: 12 MG/DL — SIGNIFICANT CHANGE UP (ref 7–23)
CALCIUM SERPL-MCNC: 8.5 MG/DL — SIGNIFICANT CHANGE UP (ref 8.4–10.5)
CHLORIDE SERPL-SCNC: 99 MMOL/L — SIGNIFICANT CHANGE UP (ref 98–107)
CO2 SERPL-SCNC: 25 MMOL/L — SIGNIFICANT CHANGE UP (ref 22–31)
CREAT SERPL-MCNC: 0.48 MG/DL — LOW (ref 0.5–1.3)
GLUCOSE SERPL-MCNC: 159 MG/DL — HIGH (ref 70–99)
HCT VFR BLD CALC: 36.1 % — LOW (ref 39–50)
HGB BLD-MCNC: 12.4 G/DL — LOW (ref 13–17)
MAGNESIUM SERPL-MCNC: 2 MG/DL — SIGNIFICANT CHANGE UP (ref 1.6–2.6)
MCHC RBC-ENTMCNC: 29 PG — SIGNIFICANT CHANGE UP (ref 27–34)
MCHC RBC-ENTMCNC: 34.3 % — SIGNIFICANT CHANGE UP (ref 32–36)
MCV RBC AUTO: 84.3 FL — SIGNIFICANT CHANGE UP (ref 80–100)
NRBC # FLD: 0 K/UL — SIGNIFICANT CHANGE UP (ref 0–0)
PHOSPHATE SERPL-MCNC: 3 MG/DL — SIGNIFICANT CHANGE UP (ref 2.5–4.5)
PLATELET # BLD AUTO: 158 K/UL — SIGNIFICANT CHANGE UP (ref 150–400)
PMV BLD: 8.7 FL — SIGNIFICANT CHANGE UP (ref 7–13)
POTASSIUM SERPL-MCNC: 3 MMOL/L — LOW (ref 3.5–5.3)
POTASSIUM SERPL-SCNC: 3 MMOL/L — LOW (ref 3.5–5.3)
RBC # BLD: 4.28 M/UL — SIGNIFICANT CHANGE UP (ref 4.2–5.8)
RBC # FLD: 12.9 % — SIGNIFICANT CHANGE UP (ref 10.3–14.5)
SODIUM SERPL-SCNC: 140 MMOL/L — SIGNIFICANT CHANGE UP (ref 135–145)
WBC # BLD: 9.01 K/UL — SIGNIFICANT CHANGE UP (ref 3.8–10.5)
WBC # FLD AUTO: 9.01 K/UL — SIGNIFICANT CHANGE UP (ref 3.8–10.5)

## 2019-09-27 PROCEDURE — 99232 SBSQ HOSP IP/OBS MODERATE 35: CPT

## 2019-09-27 PROCEDURE — 99233 SBSQ HOSP IP/OBS HIGH 50: CPT

## 2019-09-27 RX ORDER — POTASSIUM CHLORIDE 20 MEQ
20 PACKET (EA) ORAL
Refills: 0 | Status: COMPLETED | OUTPATIENT
Start: 2019-09-27 | End: 2019-09-27

## 2019-09-27 RX ADMIN — Medication 4 MILLIGRAM(S): at 19:14

## 2019-09-27 RX ADMIN — Medication 500000 UNIT(S): at 00:02

## 2019-09-27 RX ADMIN — QUETIAPINE FUMARATE 25 MILLIGRAM(S): 200 TABLET, FILM COATED ORAL at 00:02

## 2019-09-27 RX ADMIN — Medication 20 MILLIEQUIVALENT(S): at 11:11

## 2019-09-27 RX ADMIN — Medication 0.25 MILLIGRAM(S): at 06:31

## 2019-09-27 RX ADMIN — QUETIAPINE FUMARATE 25 MILLIGRAM(S): 200 TABLET, FILM COATED ORAL at 22:15

## 2019-09-27 RX ADMIN — Medication 500000 UNIT(S): at 13:31

## 2019-09-27 RX ADMIN — Medication 650 MILLIGRAM(S): at 15:20

## 2019-09-27 RX ADMIN — Medication 5 MILLIGRAM(S): at 19:15

## 2019-09-27 RX ADMIN — Medication 0.25 MILLIGRAM(S): at 13:37

## 2019-09-27 RX ADMIN — Medication 1: at 19:00

## 2019-09-27 RX ADMIN — Medication 500000 UNIT(S): at 19:13

## 2019-09-27 RX ADMIN — ENOXAPARIN SODIUM 40 MILLIGRAM(S): 100 INJECTION SUBCUTANEOUS at 13:31

## 2019-09-27 RX ADMIN — Medication 4 MILLIGRAM(S): at 00:02

## 2019-09-27 RX ADMIN — LEVETIRACETAM 500 MILLIGRAM(S): 250 TABLET, FILM COATED ORAL at 19:14

## 2019-09-27 RX ADMIN — LEVETIRACETAM 500 MILLIGRAM(S): 250 TABLET, FILM COATED ORAL at 06:11

## 2019-09-27 RX ADMIN — Medication 650 MILLIGRAM(S): at 14:34

## 2019-09-27 RX ADMIN — PANTOPRAZOLE SODIUM 40 MILLIGRAM(S): 20 TABLET, DELAYED RELEASE ORAL at 06:11

## 2019-09-27 RX ADMIN — Medication 20 MILLIEQUIVALENT(S): at 13:30

## 2019-09-27 RX ADMIN — Medication 0.25 MILLIGRAM(S): at 19:15

## 2019-09-27 RX ADMIN — Medication 4 MILLIGRAM(S): at 06:11

## 2019-09-27 RX ADMIN — Medication 4 MILLIGRAM(S): at 13:31

## 2019-09-27 RX ADMIN — Medication 0.25 MILLIGRAM(S): at 00:02

## 2019-09-27 RX ADMIN — Medication 20 MILLIEQUIVALENT(S): at 19:15

## 2019-09-27 RX ADMIN — SODIUM CHLORIDE 100 MILLILITER(S): 9 INJECTION, SOLUTION INTRAVENOUS at 14:29

## 2019-09-27 RX ADMIN — Medication 500000 UNIT(S): at 06:11

## 2019-09-27 RX ADMIN — Medication 5 MILLIGRAM(S): at 06:30

## 2019-09-27 NOTE — PROGRESS NOTE ADULT - PROBLEM SELECTOR PLAN 5
Pt is DNR / DNI. MOLST in the chart DNR / DNI / Limited medical interventions / No feeding tube/ trial of IV fluids/ Use antibiotics. He has good insight about his poor prognosis. Emotional support was provided.  Had discussion with pts daughter, given fluctuating MS and symptoms recommended home with hospice services, daughter is in agreement not to pursue further DMT for now. Agreeable to discuss further with hospice, hospice referral made.

## 2019-09-27 NOTE — PROGRESS NOTE ADULT - PROBLEM SELECTOR PLAN 1
2/2 NSCLC  -CTH undemonstrated multiple intracranial metastases. No hydrocephalus.  - Neurosurgery: not candidate for surgery  - oncology, rad onc and palliative care recs appreciated  - c/w decadron 4 mg q6 hr.  - c/w Keppra 500 mg bid for seizure prophylaxis  - c/w zofran and meclizine for n/v and vertigo management  - dilaudid 0.5mg q4h for HA  - fall precautions  -radiation therapy treatment 7 (total 10). Last tx Mon Sept 30

## 2019-09-27 NOTE — PROGRESS NOTE ADULT - SUBJECTIVE AND OBJECTIVE BOX
INTERVAL HPI/OVERNIGHT EVENTS:  Per pt nausea/vomiting improved.  Tolerating PO regimen    Code Status: DNR / DNI  Allergies    No Known Allergies    Intolerances    MEDICATIONS  (STANDING):  dexamethasone     Tablet 4 milliGRAM(s) Oral every 6 hours  dextrose 5%. 1000 milliLiter(s) (50 mL/Hr) IV Continuous <Continuous>  dextrose 50% Injectable 12.5 Gram(s) IV Push once  dextrose 50% Injectable 25 Gram(s) IV Push once  dextrose 50% Injectable 25 Gram(s) IV Push once  dronabinol 5 milliGRAM(s) Oral two times a day  enoxaparin Injectable 40 milliGRAM(s) SubCutaneous daily  insulin lispro (HumaLOG) corrective regimen sliding scale   SubCutaneous three times a day before meals  lactated ringers. 1000 milliLiter(s) (100 mL/Hr) IV Continuous <Continuous>  levETIRAcetam 500 milliGRAM(s) Oral two times a day  LORazepam     Tablet 0.25 milliGRAM(s) Oral every 6 hours  nystatin    Suspension 808351 Unit(s) Oral four times a day  pantoprazole    Tablet 40 milliGRAM(s) Oral before breakfast  potassium chloride    Tablet ER 20 milliEquivalent(s) Oral every 2 hours  QUEtiapine 25 milliGRAM(s) Oral at bedtime    MEDICATIONS  (PRN):  acetaminophen   Tablet .. 650 milliGRAM(s) Oral every 6 hours PRN Mild Pain (1 - 3)  dextrose 40% Gel 15 Gram(s) Oral once PRN Blood Glucose LESS THAN 70 milliGRAM(s)/deciliter  glucagon  Injectable 1 milliGRAM(s) IntraMuscular once PRN Glucose LESS THAN 70 milligrams/deciliter  LORazepam   Injectable 0.25 milliGRAM(s) IV Push every 4 hours PRN Nausea and/or Vomiting  meclizine 25 milliGRAM(s) Oral two times a day PRN Dizziness  melatonin 3 milliGRAM(s) Oral at bedtime PRN Insomnia  ondansetron    Tablet 4 milliGRAM(s) Oral every 6 hours PRN Nausea and/or Vomiting  polyethylene glycol 3350 17 Gram(s) Oral two times a day PRN Constipation    PRESENT SYMPTOMS: [ ]Unable to obtain due to poor mentation   Source if other than patient:  [ ]Family   [ ]Team     Pain (Impact on QOL):  No pain  Location:  Severity:  Minimal acceptable level (0-10 scale):       Quality:       Onset:  Duration:  Aggravating factors:  Relieving Factors  Radiation:    Dyspnea:  Yes [ ] No [x ] - [ ]Mild [ ]Moderate [ ]Severe  Anxiety:    Yes [ ] No [x ] - [ ]Mild [ ]Moderate [ ]Severe  Fatigue:    Yes [x ] No [ ] - [ ]Mild [ x]Moderate [ ]Severe  Nausea:    Yes [x ] No [ ] - [x ]Mild [ ]Moderate [ ]Severe                         Loss of appetite: Yes [x ] No [ ] - [x ]Mild [ ]Moderate [ ]Severe             Constipation:  Yes [ ] No [x ] - [ ]Mild [ ]Moderate [ ]Severe  Grief:  Yes [x ] No [ ]     PAIN AD Score:	  [email or web address] (Ctrl + left click to view)    Other Symptoms: +vertigo  [ x]All other review of systems negative     Karnofsky Performance Score/Palliative Performance Status Version 2:       40  %    http://palliative.info/resource_material/[email or web address].pdf    PHYSICAL EXAM:  Vital Signs Last 24 Hrs  T(C): 36.8 (27 Sep 2019 06:07), Max: 36.8 (26 Sep 2019 17:19)  T(F): 98.2 (27 Sep 2019 06:07), Max: 98.3 (26 Sep 2019 17:19)  HR: 84 (27 Sep 2019 06:07) (84 - 90)  BP: 129/64 (27 Sep 2019 06:07) (104/56 - 129/64)  BP(mean): --  RR: 18 (27 Sep 2019 06:07) (16 - 18)  SpO2: 100% (27 Sep 2019 06:07) (98% - 100%) I&O's Summary    26 Sep 2019 07:01  -  27 Sep 2019 07:00  --------------------------------------------------------  IN: 1200 mL / OUT: 1750 mL / NET: -550 mL    27 Sep 2019 07:01  -  27 Sep 2019 14:52  --------------------------------------------------------  IN: 1000 mL / OUT: 0 mL / NET: 1000 mL    GENERAL:  [x ]Alert  [x ]Oriented x 3   [ ]Lethargic  [ ]Cachexia  [ ]Unarousable  [x ]Verbal  [ ]Non-Verbal  Behavioral:   [ ] Anxiety  [ ] Delirium [ ] Agitation [ ] Other  HEENT:  [x ]Normal   [ ]Dry mouth   [ ]ET Tube/Trach  [ ]Oral lesions  PULMONARY:   [x ]Clear  [ ]Tachypnea  [ ]Audible excessive secretions   [ ]Rhonchi        [ ]Right [ ]Left [ ]Bilateral  [ ]Crackles        [ ]Right [ ]Left [ ]Bilateral  [ ]Wheezing     [ ]Right [ ]Left [ ]Bilateral  CARDIOVASCULAR:    [x ]Regular [ ]Irregular [ ]Tachy  [ ]Bryson [ ]Murmur [ ]Other  GASTROINTESTINAL:  [x ]Soft  [ ]Distended   [x ]+BS  [x ]Non tender [ ]Tender  [ ]PEG [ ]OGT/ NGT    GENITOURINARY:  [x ]Normal [ ] Incontinent   [ ]Oliguria/Anuria   [ ]Jimenez  MUSCULOSKELETAL:   [ ]Normal   [x ]Weakness  [ ]Bed/Wheelchair bound [ ]Edema  NEUROLOGIC:   [x ]No focal deficits  [ ] Cognitive impairment  [ ] Dysphagia [ ]Dysarthria [ ] Paresis [ ]Other   SKIN:   [x ]Normal   [ ]Pressure ulcer(s)  [ ]Rash    CRITICAL CARE:  [ ] Shock Present  [ ]Septic [ ]Cardiogenic [ ]Neurologic [ ]Hypovolemic  [ ]  Vasopressors [ ]  Inotropes   [ ] Respiratory failure present  [ ] Acute  [ ] Chronic [ ] Hypoxic  [ ] Hypercarbic [ ] Other  [ ] Other organ failure     LABS:                        11.1   9.39  )-----------( 171      ( 23 Sep 2019 07:15 )             33.3     09-23    135  |  101  |  14  ----------------------------<  209<H>  2.8<LL>   |  18<L>  |  0.51    Ca    7.8<L>      23 Sep 2019 07:15  Phos  2.7     09-23  Mg     2.0     09-23    TPro  4.9<L>  /  Alb  2.9<L>  /  TBili  0.3  /  DBili  x   /  AST  10  /  ALT  44<H>  /  AlkPhos  71  09-23    RADIOLOGY & ADDITIONAL STUDIES: reviewed.     Protein Calorie Malnutrition Present: [ ] yes [ ] no  [ ] PPSV2 < or = 30%  [ ] significant weight loss [ ] poor nutritional intake [ ] anasarca [ ] catabolic state Albumin, Serum: 3.9 g/dL (09-13-19 @ 08:10)    REFERRALS:   [ ]Chaplaincy  [ ] Hospice  [ ]Child Life  [ ]Social Work  [ ]Case management [ ]Holistic Therapy   Goals of Care Document:

## 2019-09-27 NOTE — PROGRESS NOTE ADULT - PROBLEM SELECTOR PLAN 1
Tolerated well PO regimen. Continue with Ativan PO 0.25mg PO q6hr atc. with Ativan 0.25mg IV q3h PRN for breakthrough vomiting. Pt encouraged to ask for PRNs in case of nausea.   Pt already on steroids.

## 2019-09-27 NOTE — PROGRESS NOTE ADULT - SUBJECTIVE AND OBJECTIVE BOX
Alexa Stringer MD  PGY-1  Pager [phone]  Pager S [phone]      Patient is a 68y old  Male who presents with a chief complaint of blurry vision, HA, vertigo (26 Sep 2019 12:57)        SUBJECTIVE / OVERNIGHT EVENTS: Patient had no acute events overnight. Patient seen and examined at bedside this morning.     ROS: [ - ] Fever [ - ] Chills [ - ] Nausea/Vomiting [ - ] Chest Pain [ - ] Shortness of breath     MEDICATIONS  (STANDING):  dexamethasone     Tablet 4 milliGRAM(s) Oral every 6 hours  dextrose 5%. 1000 milliLiter(s) (50 mL/Hr) IV Continuous <Continuous>  dextrose 50% Injectable 12.5 Gram(s) IV Push once  dextrose 50% Injectable 25 Gram(s) IV Push once  dextrose 50% Injectable 25 Gram(s) IV Push once  dronabinol 5 milliGRAM(s) Oral two times a day  enoxaparin Injectable 40 milliGRAM(s) SubCutaneous daily  insulin lispro (HumaLOG) corrective regimen sliding scale   SubCutaneous three times a day before meals  lactated ringers. 1000 milliLiter(s) (100 mL/Hr) IV Continuous <Continuous>  levETIRAcetam 500 milliGRAM(s) Oral two times a day  LORazepam     Tablet 0.25 milliGRAM(s) Oral every 6 hours  nystatin    Suspension 116239 Unit(s) Oral four times a day  pantoprazole    Tablet 40 milliGRAM(s) Oral before breakfast  QUEtiapine 25 milliGRAM(s) Oral at bedtime  sodium chloride 0.9%. 500 milliLiter(s) (100 mL/Hr) IV Continuous <Continuous>    MEDICATIONS  (PRN):  acetaminophen   Tablet .. 650 milliGRAM(s) Oral every 6 hours PRN Mild Pain (1 - 3)  dextrose 40% Gel 15 Gram(s) Oral once PRN Blood Glucose LESS THAN 70 milliGRAM(s)/deciliter  glucagon  Injectable 1 milliGRAM(s) IntraMuscular once PRN Glucose LESS THAN 70 milligrams/deciliter  LORazepam   Injectable 0.25 milliGRAM(s) IV Push every 4 hours PRN Nausea and/or Vomiting  meclizine 25 milliGRAM(s) Oral two times a day PRN Dizziness  melatonin 3 milliGRAM(s) Oral at bedtime PRN Insomnia  ondansetron    Tablet 4 milliGRAM(s) Oral every 6 hours PRN Nausea and/or Vomiting  polyethylene glycol 3350 17 Gram(s) Oral two times a day PRN Constipation      Vital Signs Last 24 Hrs  T(C): 36.8 (27 Sep 2019 06:07), Max: 36.8 (26 Sep 2019 17:19)  T(F): 98.2 (27 Sep 2019 06:07), Max: 98.3 (26 Sep 2019 17:19)  HR: 84 (27 Sep 2019 06:07) (84 - 95)  BP: 129/64 (27 Sep 2019 06:07) (100/56 - 129/64)  BP(mean): --  RR: 18 (27 Sep 2019 06:07) (16 - 18)  SpO2: 100% (27 Sep 2019 06:07) (98% - 100%)  CAPILLARY BLOOD GLUCOSE      POCT Blood Glucose.: 188 mg/dL (26 Sep 2019 22:20)  POCT Blood Glucose.: 170 mg/dL (26 Sep 2019 17:58)  POCT Blood Glucose.: 202 mg/dL (26 Sep 2019 13:03)  POCT Blood Glucose.: 175 mg/dL (26 Sep 2019 10:29)    I&O's Summary    25 Sep 2019 07:01  -  26 Sep 2019 07:00  --------------------------------------------------------  IN: 1100 mL / OUT: 1450 mL / NET: -350 mL    26 Sep 2019 07:01  -  27 Sep 2019 06:52  --------------------------------------------------------  IN: 1200 mL / OUT: 1750 mL / NET: -550 mL        PHYSICAL EXAM  GENERAL: NAD, lying comfortably in bed, cachetic  Head:  Atraumatic, Normocephalic  CHEST/LUNG: Clear to auscultation bilaterally; No wheeze  HEART: RRR, S1 and S2 No murmurs, rubs, or gallops  ABDOMEN: Soft, Nontender, Nondistended; Bowel sounds present  EXTREMITIES:  2+ Peripheral Pulses, No clubbing, cyanosis, or edema  NEURO: AAOx3, non-focal  SKIN: No rashes or lesions    LABS:                      RADIOLOGY & ADDITIONAL TESTS:    Imaging Personally Reviewed:  Consultant(s) Notes Reviewed:

## 2019-09-27 NOTE — PROGRESS NOTE ADULT - PROBLEM SELECTOR PROBLEM 5
Palliative care encounter
Lung cancer metastatic to brain
Need for prophylactic measure
Palliative care encounter
Need for prophylactic measure
Need for prophylactic measure

## 2019-09-28 PROCEDURE — 99233 SBSQ HOSP IP/OBS HIGH 50: CPT

## 2019-09-28 RX ADMIN — Medication 500000 UNIT(S): at 00:10

## 2019-09-28 RX ADMIN — QUETIAPINE FUMARATE 25 MILLIGRAM(S): 200 TABLET, FILM COATED ORAL at 22:46

## 2019-09-28 RX ADMIN — Medication 500000 UNIT(S): at 19:02

## 2019-09-28 RX ADMIN — Medication 4 MILLIGRAM(S): at 13:20

## 2019-09-28 RX ADMIN — LEVETIRACETAM 500 MILLIGRAM(S): 250 TABLET, FILM COATED ORAL at 06:52

## 2019-09-28 RX ADMIN — Medication 4 MILLIGRAM(S): at 19:02

## 2019-09-28 RX ADMIN — PANTOPRAZOLE SODIUM 40 MILLIGRAM(S): 20 TABLET, DELAYED RELEASE ORAL at 06:52

## 2019-09-28 RX ADMIN — Medication 500000 UNIT(S): at 13:20

## 2019-09-28 RX ADMIN — Medication 0.25 MILLIGRAM(S): at 19:02

## 2019-09-28 RX ADMIN — Medication 0.25 MILLIGRAM(S): at 00:09

## 2019-09-28 RX ADMIN — Medication 650 MILLIGRAM(S): at 13:20

## 2019-09-28 RX ADMIN — SODIUM CHLORIDE 100 MILLILITER(S): 9 INJECTION, SOLUTION INTRAVENOUS at 15:20

## 2019-09-28 RX ADMIN — SODIUM CHLORIDE 100 MILLILITER(S): 9 INJECTION, SOLUTION INTRAVENOUS at 07:00

## 2019-09-28 RX ADMIN — Medication 4 MILLIGRAM(S): at 00:09

## 2019-09-28 RX ADMIN — Medication 0.25 MILLIGRAM(S): at 13:20

## 2019-09-28 RX ADMIN — ENOXAPARIN SODIUM 40 MILLIGRAM(S): 100 INJECTION SUBCUTANEOUS at 13:21

## 2019-09-28 RX ADMIN — Medication 5 MILLIGRAM(S): at 06:56

## 2019-09-28 RX ADMIN — Medication 4 MILLIGRAM(S): at 06:52

## 2019-09-28 RX ADMIN — Medication 1: at 09:06

## 2019-09-28 RX ADMIN — Medication 2: at 18:46

## 2019-09-28 RX ADMIN — Medication 5 MILLIGRAM(S): at 19:02

## 2019-09-28 RX ADMIN — Medication 0.25 MILLIGRAM(S): at 06:53

## 2019-09-28 RX ADMIN — LEVETIRACETAM 500 MILLIGRAM(S): 250 TABLET, FILM COATED ORAL at 19:02

## 2019-09-28 RX ADMIN — Medication 500000 UNIT(S): at 06:52

## 2019-09-28 RX ADMIN — Medication 650 MILLIGRAM(S): at 14:00

## 2019-09-28 NOTE — PROGRESS NOTE ADULT - SUBJECTIVE AND OBJECTIVE BOX
Alexa Stringer MD  PGY-1  Pager [phone]  Pager [phone]      Patient is a 68y old  Male who presents with a chief complaint of blurry vision, HA, vertigo (27 Sep 2019 14:50)        SUBJECTIVE / OVERNIGHT EVENTS: Patient had no acute events overnight. Patient seen and examined at bedside this morning.     ROS: [ - ] Fever [ - ] Chills [ - ] Nausea/Vomiting [ - ] Chest Pain [ - ] Shortness of breath     MEDICATIONS  (STANDING):  dexamethasone     Tablet 4 milliGRAM(s) Oral every 6 hours  dextrose 5%. 1000 milliLiter(s) (50 mL/Hr) IV Continuous <Continuous>  dextrose 50% Injectable 12.5 Gram(s) IV Push once  dextrose 50% Injectable 25 Gram(s) IV Push once  dextrose 50% Injectable 25 Gram(s) IV Push once  dronabinol 5 milliGRAM(s) Oral two times a day  enoxaparin Injectable 40 milliGRAM(s) SubCutaneous daily  insulin lispro (HumaLOG) corrective regimen sliding scale   SubCutaneous three times a day before meals  lactated ringers. 1000 milliLiter(s) (100 mL/Hr) IV Continuous <Continuous>  levETIRAcetam 500 milliGRAM(s) Oral two times a day  LORazepam     Tablet 0.25 milliGRAM(s) Oral every 6 hours  nystatin    Suspension 820370 Unit(s) Oral four times a day  pantoprazole    Tablet 40 milliGRAM(s) Oral before breakfast  QUEtiapine 25 milliGRAM(s) Oral at bedtime    MEDICATIONS  (PRN):  acetaminophen   Tablet .. 650 milliGRAM(s) Oral every 6 hours PRN Mild Pain (1 - 3)  dextrose 40% Gel 15 Gram(s) Oral once PRN Blood Glucose LESS THAN 70 milliGRAM(s)/deciliter  glucagon  Injectable 1 milliGRAM(s) IntraMuscular once PRN Glucose LESS THAN 70 milligrams/deciliter  LORazepam   Injectable 0.25 milliGRAM(s) IV Push every 4 hours PRN Nausea and/or Vomiting  meclizine 25 milliGRAM(s) Oral two times a day PRN Dizziness  melatonin 3 milliGRAM(s) Oral at bedtime PRN Insomnia  ondansetron    Tablet 4 milliGRAM(s) Oral every 6 hours PRN Nausea and/or Vomiting  polyethylene glycol 3350 17 Gram(s) Oral two times a day PRN Constipation      Vital Signs Last 24 Hrs  T(C): 36.7 (28 Sep 2019 06:01), Max: 36.8 (27 Sep 2019 15:09)  T(F): 98 (28 Sep 2019 06:01), Max: 98.2 (27 Sep 2019 15:09)  HR: 85 (28 Sep 2019 06:01) (81 - 93)  BP: 112/63 (28 Sep 2019 06:01) (106/67 - 112/63)  BP(mean): --  RR: 18 (28 Sep 2019 06:01) (17 - 18)  SpO2: 100% (28 Sep 2019 06:01) (99% - 100%)  CAPILLARY BLOOD GLUCOSE      POCT Blood Glucose.: 203 mg/dL (27 Sep 2019 22:36)  POCT Blood Glucose.: 168 mg/dL (27 Sep 2019 18:08)  POCT Blood Glucose.: 119 mg/dL (27 Sep 2019 12:19)    I&O's Summary    27 Sep 2019 07:01  -  28 Sep 2019 07:00  --------------------------------------------------------  IN: 1000 mL / OUT: 1250 mL / NET: -250 mL        PHYSICAL EXAM  GENERAL: NAD, lying comfortably in bed   HEENT:  Atraumatic, Normocephalic, EOMI, conjunctiva and sclera clear, no LAD  CHEST/LUNG: Clear to auscultation bilaterally; No wheeze  HEART: RRR, S1 and S2 No murmurs, rubs, or gallops  ABDOMEN: Soft, Nontender, Nondistended; Bowel sounds present  EXTREMITIES:  2+ Peripheral Pulses, No clubbing, cyanosis, or edema  NEURO: AAOx3, non-focal  SKIN: No rashes or lesions    LABS:                        12.4   9.01  )-----------( 158      ( 27 Sep 2019 07:33 )             36.1     09-27    140  |  99  |  12  ----------------------------<  159<H>  3.0<L>   |  25  |  0.48<L>    Ca    8.5      27 Sep 2019 07:33  Phos  3.0     09-27  Mg     2.0     09-27                  RADIOLOGY & ADDITIONAL TESTS:    Imaging Personally Reviewed:  Consultant(s) Notes Reviewed: Alexa Stringer MD  PGY-1  Pager [phone]  Pager [facility] [phone]      Patient is a 68y old  Male who presents with a chief complaint of blurry vision, HA, vertigo (27 Sep 2019 14:50)        SUBJECTIVE / OVERNIGHT EVENTS: Patient had no acute events overnight. Patient seen and examined at bedside this morning. Blurry vision, dizziness, leg weakness unchanged. Endorsed hallucinations but not new. No additional ep of hematuria    ROS: [ - ] Fever [ - ] Chills [ - ] Nausea/Vomiting [ - ] Chest Pain [ - ] Shortness of breath     MEDICATIONS  (STANDING):  dexamethasone     Tablet 4 milliGRAM(s) Oral every 6 hours  dextrose 5%. 1000 milliLiter(s) (50 mL/Hr) IV Continuous <Continuous>  dextrose 50% Injectable 12.5 Gram(s) IV Push once  dextrose 50% Injectable 25 Gram(s) IV Push once  dextrose 50% Injectable 25 Gram(s) IV Push once  dronabinol 5 milliGRAM(s) Oral two times a day  enoxaparin Injectable 40 milliGRAM(s) SubCutaneous daily  insulin lispro (HumaLOG) corrective regimen sliding scale   SubCutaneous three times a day before meals  lactated ringers. 1000 milliLiter(s) (100 mL/Hr) IV Continuous <Continuous>  levETIRAcetam 500 milliGRAM(s) Oral two times a day  LORazepam     Tablet 0.25 milliGRAM(s) Oral every 6 hours  nystatin    Suspension 588712 Unit(s) Oral four times a day  pantoprazole    Tablet 40 milliGRAM(s) Oral before breakfast  QUEtiapine 25 milliGRAM(s) Oral at bedtime    MEDICATIONS  (PRN):  acetaminophen   Tablet .. 650 milliGRAM(s) Oral every 6 hours PRN Mild Pain (1 - 3)  dextrose 40% Gel 15 Gram(s) Oral once PRN Blood Glucose LESS THAN 70 milliGRAM(s)/deciliter  glucagon  Injectable 1 milliGRAM(s) IntraMuscular once PRN Glucose LESS THAN 70 milligrams/deciliter  LORazepam   Injectable 0.25 milliGRAM(s) IV Push every 4 hours PRN Nausea and/or Vomiting  meclizine 25 milliGRAM(s) Oral two times a day PRN Dizziness  melatonin 3 milliGRAM(s) Oral at bedtime PRN Insomnia  ondansetron    Tablet 4 milliGRAM(s) Oral every 6 hours PRN Nausea and/or Vomiting  polyethylene glycol 3350 17 Gram(s) Oral two times a day PRN Constipation      Vital Signs Last 24 Hrs  T(C): 36.7 (28 Sep 2019 06:01), Max: 36.8 (27 Sep 2019 15:09)  T(F): 98 (28 Sep 2019 06:01), Max: 98.2 (27 Sep 2019 15:09)  HR: 85 (28 Sep 2019 06:01) (81 - 93)  BP: 112/63 (28 Sep 2019 06:01) (106/67 - 112/63)  BP(mean): --  RR: 18 (28 Sep 2019 06:01) (17 - 18)  SpO2: 100% (28 Sep 2019 06:01) (99% - 100%)  CAPILLARY BLOOD GLUCOSE      POCT Blood Glucose.: 203 mg/dL (27 Sep 2019 22:36)  POCT Blood Glucose.: 168 mg/dL (27 Sep 2019 18:08)  POCT Blood Glucose.: 119 mg/dL (27 Sep 2019 12:19)    I&O's Summary    27 Sep 2019 07:01  -  28 Sep 2019 07:00  --------------------------------------------------------  IN: 1000 mL / OUT: 1250 mL / NET: -250 mL        PHYSICAL EXAM  GENERAL: NAD, lying comfortably in bed, appeared confused  HEENT:  Atraumatic, Normocephalic, EOMI, conjunctiva and sclera clear, no LAD  CHEST/LUNG: Clear to auscultation bilaterally; No wheeze  HEART: RRR, S1 and S2 No murmurs, rubs, or gallops  ABDOMEN: Soft, Nontender, Nondistended; Bowel sounds present  EXTREMITIES:  2+ Peripheral Pulses, No clubbing, cyanosis, or edema  NEURO: AAOx2, non-focal  SKIN: No rashes or lesions    LABS:                        12.4   9.01  )-----------( 158      ( 27 Sep 2019 07:33 )             36.1     09-27    140  |  99  |  12  ----------------------------<  159<H>  3.0<L>   |  25  |  0.48<L>    Ca    8.5      27 Sep 2019 07:33  Phos  3.0     09-27  Mg     2.0     09-27                  RADIOLOGY & ADDITIONAL TESTS:    Imaging Personally Reviewed:  Consultant(s) Notes Reviewed:

## 2019-09-28 NOTE — PROGRESS NOTE ADULT - PROBLEM SELECTOR PLAN 1
2/2 NSCLC  -CTH undemonstrated multiple intracranial metastases. No hydrocephalus.  - Neurosurgery: not candidate for surgery  - oncology, rad onc and palliative care recs appreciated  - c/w decadron 4 mg q6 hr.  - c/w Keppra 500 mg bid for seizure prophylaxis  - c/w zofran and meclizine for n/v and vertigo management  - dilaudid 0.5mg q4h for HA  - fall precautions  -radiation therapy. Last tx Mon Sept 30

## 2019-09-28 NOTE — PROGRESS NOTE ADULT - PROBLEM SELECTOR PLAN 4
DVT ppx: Lovenox 40 mg qd  Diet: low Na diet with ensurex3  Dispo: PT rec home PT  DNR/DNI DVT ppx: Lovenox 40 mg qd  Diet: low Na diet with ensurex3  Dispo: PT rec home PT, family want home hospice  DNR/DNI

## 2019-09-29 LAB
ANION GAP SERPL CALC-SCNC: 17 MMO/L — HIGH (ref 7–14)
BUN SERPL-MCNC: 14 MG/DL — SIGNIFICANT CHANGE UP (ref 7–23)
CALCIUM SERPL-MCNC: 8.5 MG/DL — SIGNIFICANT CHANGE UP (ref 8.4–10.5)
CHLORIDE SERPL-SCNC: 96 MMOL/L — LOW (ref 98–107)
CO2 SERPL-SCNC: 24 MMOL/L — SIGNIFICANT CHANGE UP (ref 22–31)
CREAT SERPL-MCNC: 0.48 MG/DL — LOW (ref 0.5–1.3)
GLUCOSE SERPL-MCNC: 154 MG/DL — HIGH (ref 70–99)
HCT VFR BLD CALC: 34.3 % — LOW (ref 39–50)
HGB BLD-MCNC: 11.6 G/DL — LOW (ref 13–17)
MAGNESIUM SERPL-MCNC: 2 MG/DL — SIGNIFICANT CHANGE UP (ref 1.6–2.6)
MCHC RBC-ENTMCNC: 28.8 PG — SIGNIFICANT CHANGE UP (ref 27–34)
MCHC RBC-ENTMCNC: 33.8 % — SIGNIFICANT CHANGE UP (ref 32–36)
MCV RBC AUTO: 85.1 FL — SIGNIFICANT CHANGE UP (ref 80–100)
NRBC # FLD: 0 K/UL — SIGNIFICANT CHANGE UP (ref 0–0)
PHOSPHATE SERPL-MCNC: 2.9 MG/DL — SIGNIFICANT CHANGE UP (ref 2.5–4.5)
PLATELET # BLD AUTO: 134 K/UL — LOW (ref 150–400)
PMV BLD: 9 FL — SIGNIFICANT CHANGE UP (ref 7–13)
POTASSIUM SERPL-MCNC: 3 MMOL/L — LOW (ref 3.5–5.3)
POTASSIUM SERPL-SCNC: 3 MMOL/L — LOW (ref 3.5–5.3)
RBC # BLD: 4.03 M/UL — LOW (ref 4.2–5.8)
RBC # FLD: 12.9 % — SIGNIFICANT CHANGE UP (ref 10.3–14.5)
SODIUM SERPL-SCNC: 137 MMOL/L — SIGNIFICANT CHANGE UP (ref 135–145)
WBC # BLD: 8.86 K/UL — SIGNIFICANT CHANGE UP (ref 3.8–10.5)
WBC # FLD AUTO: 8.86 K/UL — SIGNIFICANT CHANGE UP (ref 3.8–10.5)

## 2019-09-29 PROCEDURE — 99233 SBSQ HOSP IP/OBS HIGH 50: CPT

## 2019-09-29 RX ORDER — POTASSIUM CHLORIDE 20 MEQ
40 PACKET (EA) ORAL ONCE
Refills: 0 | Status: COMPLETED | OUTPATIENT
Start: 2019-09-29 | End: 2019-09-29

## 2019-09-29 RX ADMIN — Medication 500000 UNIT(S): at 18:25

## 2019-09-29 RX ADMIN — Medication 5 MILLIGRAM(S): at 06:08

## 2019-09-29 RX ADMIN — Medication 5 MILLIGRAM(S): at 18:25

## 2019-09-29 RX ADMIN — Medication 500000 UNIT(S): at 13:31

## 2019-09-29 RX ADMIN — ENOXAPARIN SODIUM 40 MILLIGRAM(S): 100 INJECTION SUBCUTANEOUS at 13:31

## 2019-09-29 RX ADMIN — Medication 650 MILLIGRAM(S): at 14:23

## 2019-09-29 RX ADMIN — Medication 4 MILLIGRAM(S): at 00:47

## 2019-09-29 RX ADMIN — Medication 0.25 MILLIGRAM(S): at 18:25

## 2019-09-29 RX ADMIN — Medication 0.25 MILLIGRAM(S): at 06:08

## 2019-09-29 RX ADMIN — LEVETIRACETAM 500 MILLIGRAM(S): 250 TABLET, FILM COATED ORAL at 18:25

## 2019-09-29 RX ADMIN — LEVETIRACETAM 500 MILLIGRAM(S): 250 TABLET, FILM COATED ORAL at 06:07

## 2019-09-29 RX ADMIN — SODIUM CHLORIDE 100 MILLILITER(S): 9 INJECTION, SOLUTION INTRAVENOUS at 18:25

## 2019-09-29 RX ADMIN — Medication 500000 UNIT(S): at 00:47

## 2019-09-29 RX ADMIN — Medication 0.25 MILLIGRAM(S): at 13:31

## 2019-09-29 RX ADMIN — Medication 500000 UNIT(S): at 06:07

## 2019-09-29 RX ADMIN — Medication 1: at 09:41

## 2019-09-29 RX ADMIN — PANTOPRAZOLE SODIUM 40 MILLIGRAM(S): 20 TABLET, DELAYED RELEASE ORAL at 06:08

## 2019-09-29 RX ADMIN — Medication 4 MILLIGRAM(S): at 18:25

## 2019-09-29 RX ADMIN — Medication 2: at 18:25

## 2019-09-29 RX ADMIN — Medication 0.25 MILLIGRAM(S): at 00:47

## 2019-09-29 RX ADMIN — Medication 4 MILLIGRAM(S): at 06:08

## 2019-09-29 RX ADMIN — QUETIAPINE FUMARATE 25 MILLIGRAM(S): 200 TABLET, FILM COATED ORAL at 22:02

## 2019-09-29 RX ADMIN — Medication 4 MILLIGRAM(S): at 13:31

## 2019-09-29 RX ADMIN — Medication 650 MILLIGRAM(S): at 13:31

## 2019-09-29 NOTE — PROGRESS NOTE ADULT - SUBJECTIVE AND OBJECTIVE BOX
Patient is a 68y old  Male who presents with a chief complaint of blurry vision, HA, vertigo (28 Sep 2019 07:15)      SUBJECTIVE / OVERNIGHT EVENTS:          MEDICATIONS  (STANDING):  dexamethasone     Tablet 4 milliGRAM(s) Oral every 6 hours  dextrose 5%. 1000 milliLiter(s) (50 mL/Hr) IV Continuous <Continuous>  dextrose 50% Injectable 12.5 Gram(s) IV Push once  dextrose 50% Injectable 25 Gram(s) IV Push once  dextrose 50% Injectable 25 Gram(s) IV Push once  dronabinol 5 milliGRAM(s) Oral two times a day  enoxaparin Injectable 40 milliGRAM(s) SubCutaneous daily  insulin lispro (HumaLOG) corrective regimen sliding scale   SubCutaneous three times a day before meals  lactated ringers. 1000 milliLiter(s) (100 mL/Hr) IV Continuous <Continuous>  levETIRAcetam 500 milliGRAM(s) Oral two times a day  LORazepam     Tablet 0.25 milliGRAM(s) Oral every 6 hours  nystatin    Suspension 068895 Unit(s) Oral four times a day  pantoprazole    Tablet 40 milliGRAM(s) Oral before breakfast  QUEtiapine 25 milliGRAM(s) Oral at bedtime    MEDICATIONS  (PRN):  acetaminophen   Tablet .. 650 milliGRAM(s) Oral every 6 hours PRN Mild Pain (1 - 3)  dextrose 40% Gel 15 Gram(s) Oral once PRN Blood Glucose LESS THAN 70 milliGRAM(s)/deciliter  glucagon  Injectable 1 milliGRAM(s) IntraMuscular once PRN Glucose LESS THAN 70 milligrams/deciliter  LORazepam   Injectable 0.25 milliGRAM(s) IV Push every 4 hours PRN Nausea and/or Vomiting  meclizine 25 milliGRAM(s) Oral two times a day PRN Dizziness  melatonin 3 milliGRAM(s) Oral at bedtime PRN Insomnia  ondansetron    Tablet 4 milliGRAM(s) Oral every 6 hours PRN Nausea and/or Vomiting  polyethylene glycol 3350 17 Gram(s) Oral two times a day PRN Constipation      Vital Signs Last 24 Hrs  T(C): 36.9 (29 Sep 2019 06:08), Max: 37.3 (28 Sep 2019 14:51)  T(F): 98.4 (29 Sep 2019 06:08), Max: 99.2 (28 Sep 2019 14:51)  HR: 92 (29 Sep 2019 06:08) (87 - 102)  BP: 102/60 (29 Sep 2019 06:08) (98/55 - 102/60)  BP(mean): --  RR: 17 (29 Sep 2019 06:08) (17 - 20)  SpO2: 99% (29 Sep 2019 06:08) (98% - 99%)      PHYSICAL EXAM  GENERAL: NAD, well-developed  HEAD:  Atraumatic, Normocephalic  EYES: EOMI, PERRLA, conjunctiva and sclera clear  NECK: Supple, No JVD  CHEST/LUNG: Clear to auscultation bilaterally; No wheeze  HEART: Regular rate and rhythm; No murmurs, rubs, or gallops  ABDOMEN: Soft, Nontender, Nondistended; Bowel sounds present  EXTREMITIES:  2+ Peripheral Pulses, No clubbing, cyanosis, or edema  PSYCH: AAOx3  SKIN: No rashes or lesions    CAPILLARY BLOOD GLUCOSE      POCT Blood Glucose.: 121 mg/dL (28 Sep 2019 22:48)  POCT Blood Glucose.: 227 mg/dL (28 Sep 2019 17:29)  POCT Blood Glucose.: 132 mg/dL (28 Sep 2019 13:07)  POCT Blood Glucose.: 183 mg/dL (28 Sep 2019 08:51)    I&O's Summary    27 Sep 2019 07:01  -  28 Sep 2019 07:00  --------------------------------------------------------  IN: 2340 mL / OUT: 1450 mL / NET: 890 mL    28 Sep 2019 07:01  -  29 Sep 2019 06:50  --------------------------------------------------------  IN: 2240 mL / OUT: 1370 mL / NET: 870 mL        LABS:                        12.4   9.01  )-----------( 158      ( 27 Sep 2019 07:33 )             36.1     09-27    140  |  99  |  12  ----------------------------<  159<H>  3.0<L>   |  25  |  0.48<L>    Ca    8.5      27 Sep 2019 07:33  Phos  3.0     09-27  Mg     2.0     09-27                RADIOLOGY & ADDITIONAL TESTS:     MICROBIOLOGY:    ANTIMICROBIALS:    CONSULTS: Patient is a 68y old  Male who presents with a chief complaint of blurry vision, HA, vertigo (28 Sep 2019 07:15)      SUBJECTIVE / OVERNIGHT EVENTS:    No acute events overnight. Denies fever, chills, nausea, vomiting, abdominal pain, diarrhea, constipation, CP, palpitations, SOB, or LE edema.       MEDICATIONS  (STANDING):  dexamethasone     Tablet 4 milliGRAM(s) Oral every 6 hours  dextrose 5%. 1000 milliLiter(s) (50 mL/Hr) IV Continuous <Continuous>  dextrose 50% Injectable 12.5 Gram(s) IV Push once  dextrose 50% Injectable 25 Gram(s) IV Push once  dextrose 50% Injectable 25 Gram(s) IV Push once  dronabinol 5 milliGRAM(s) Oral two times a day  enoxaparin Injectable 40 milliGRAM(s) SubCutaneous daily  insulin lispro (HumaLOG) corrective regimen sliding scale   SubCutaneous three times a day before meals  lactated ringers. 1000 milliLiter(s) (100 mL/Hr) IV Continuous <Continuous>  levETIRAcetam 500 milliGRAM(s) Oral two times a day  LORazepam     Tablet 0.25 milliGRAM(s) Oral every 6 hours  nystatin    Suspension 875702 Unit(s) Oral four times a day  pantoprazole    Tablet 40 milliGRAM(s) Oral before breakfast  QUEtiapine 25 milliGRAM(s) Oral at bedtime    MEDICATIONS  (PRN):  acetaminophen   Tablet .. 650 milliGRAM(s) Oral every 6 hours PRN Mild Pain (1 - 3)  dextrose 40% Gel 15 Gram(s) Oral once PRN Blood Glucose LESS THAN 70 milliGRAM(s)/deciliter  glucagon  Injectable 1 milliGRAM(s) IntraMuscular once PRN Glucose LESS THAN 70 milligrams/deciliter  LORazepam   Injectable 0.25 milliGRAM(s) IV Push every 4 hours PRN Nausea and/or Vomiting  meclizine 25 milliGRAM(s) Oral two times a day PRN Dizziness  melatonin 3 milliGRAM(s) Oral at bedtime PRN Insomnia  ondansetron    Tablet 4 milliGRAM(s) Oral every 6 hours PRN Nausea and/or Vomiting  polyethylene glycol 3350 17 Gram(s) Oral two times a day PRN Constipation      Vital Signs Last 24 Hrs  T(C): 36.9 (29 Sep 2019 06:08), Max: 37.3 (28 Sep 2019 14:51)  T(F): 98.4 (29 Sep 2019 06:08), Max: 99.2 (28 Sep 2019 14:51)  HR: 92 (29 Sep 2019 06:08) (87 - 102)  BP: 102/60 (29 Sep 2019 06:08) (98/55 - 102/60)  BP(mean): --  RR: 17 (29 Sep 2019 06:08) (17 - 20)  SpO2: 99% (29 Sep 2019 06:08) (98% - 99%)      PHYSICAL EXAM  GENERAL: NAD, cachectic  HEAD:  Atraumatic, Normocephalic  EYES: Conjunctiva and sclera clear  NECK: Supple, No JVD  CHEST/LUNG: Clear to auscultation bilaterally; No wheeze  HEART: Regular rate and rhythm; No murmurs, rubs, or gallops  ABDOMEN: Soft, Nontender, Nondistended; Bowel sounds present  EXTREMITIES:  2+ Peripheral Pulses, No clubbing, cyanosis, or edema      CAPILLARY BLOOD GLUCOSE      POCT Blood Glucose.: 121 mg/dL (28 Sep 2019 22:48)  POCT Blood Glucose.: 227 mg/dL (28 Sep 2019 17:29)  POCT Blood Glucose.: 132 mg/dL (28 Sep 2019 13:07)  POCT Blood Glucose.: 183 mg/dL (28 Sep 2019 08:51)    I&O's Summary    27 Sep 2019 07:01  -  28 Sep 2019 07:00  --------------------------------------------------------  IN: 2340 mL / OUT: 1450 mL / NET: 890 mL    28 Sep 2019 07:01  -  29 Sep 2019 06:50  --------------------------------------------------------  IN: 2240 mL / OUT: 1370 mL / NET: 870 mL        LABS:                        12.4   9.01  )-----------( 158      ( 27 Sep 2019 07:33 )             36.1     09-27    140  |  99  |  12  ----------------------------<  159<H>  3.0<L>   |  25  |  0.48<L>    Ca    8.5      27 Sep 2019 07:33  Phos  3.0     09-27  Mg     2.0     09-27                RADIOLOGY & ADDITIONAL TESTS:     MICROBIOLOGY:    ANTIMICROBIALS:    CONSULTS:

## 2019-09-29 NOTE — PROGRESS NOTE ADULT - PROBLEM SELECTOR PLAN 4
DVT ppx: Lovenox 40 mg qd  Diet: low Na diet with ensurex3  Dispo: PT rec home PT, family want home hospice  DNR/DNI

## 2019-09-30 ENCOUNTER — TRANSCRIPTION ENCOUNTER (OUTPATIENT)
Age: 69
End: 2019-09-30

## 2019-09-30 VITALS
OXYGEN SATURATION: 98 % | DIASTOLIC BLOOD PRESSURE: 63 MMHG | TEMPERATURE: 98 F | HEART RATE: 99 BPM | SYSTOLIC BLOOD PRESSURE: 93 MMHG | RESPIRATION RATE: 17 BRPM

## 2019-09-30 LAB
ANION GAP SERPL CALC-SCNC: 15 MMO/L — HIGH (ref 7–14)
BUN SERPL-MCNC: 14 MG/DL — SIGNIFICANT CHANGE UP (ref 7–23)
CALCIUM SERPL-MCNC: 8.4 MG/DL — SIGNIFICANT CHANGE UP (ref 8.4–10.5)
CHLORIDE SERPL-SCNC: 98 MMOL/L — SIGNIFICANT CHANGE UP (ref 98–107)
CO2 SERPL-SCNC: 25 MMOL/L — SIGNIFICANT CHANGE UP (ref 22–31)
CREAT SERPL-MCNC: 0.51 MG/DL — SIGNIFICANT CHANGE UP (ref 0.5–1.3)
GLUCOSE SERPL-MCNC: 135 MG/DL — HIGH (ref 70–99)
POTASSIUM SERPL-MCNC: 3.7 MMOL/L — SIGNIFICANT CHANGE UP (ref 3.5–5.3)
POTASSIUM SERPL-SCNC: 3.7 MMOL/L — SIGNIFICANT CHANGE UP (ref 3.5–5.3)
SODIUM SERPL-SCNC: 138 MMOL/L — SIGNIFICANT CHANGE UP (ref 135–145)

## 2019-09-30 PROCEDURE — 99239 HOSP IP/OBS DSCHRG MGMT >30: CPT | Mod: GC

## 2019-09-30 PROCEDURE — 77427 RADIATION TX MANAGEMENT X5: CPT

## 2019-09-30 RX ADMIN — Medication 500000 UNIT(S): at 13:09

## 2019-09-30 RX ADMIN — Medication 4 MILLIGRAM(S): at 18:24

## 2019-09-30 RX ADMIN — Medication 500000 UNIT(S): at 18:26

## 2019-09-30 RX ADMIN — Medication 5 MILLIGRAM(S): at 18:23

## 2019-09-30 RX ADMIN — Medication 4 MILLIGRAM(S): at 13:08

## 2019-09-30 RX ADMIN — Medication 500000 UNIT(S): at 00:57

## 2019-09-30 RX ADMIN — LEVETIRACETAM 500 MILLIGRAM(S): 250 TABLET, FILM COATED ORAL at 18:41

## 2019-09-30 RX ADMIN — Medication 650 MILLIGRAM(S): at 15:46

## 2019-09-30 RX ADMIN — SODIUM CHLORIDE 100 MILLILITER(S): 9 INJECTION, SOLUTION INTRAVENOUS at 06:19

## 2019-09-30 RX ADMIN — ENOXAPARIN SODIUM 40 MILLIGRAM(S): 100 INJECTION SUBCUTANEOUS at 13:08

## 2019-09-30 RX ADMIN — Medication 0.25 MILLIGRAM(S): at 13:24

## 2019-09-30 RX ADMIN — Medication 4 MILLIGRAM(S): at 00:57

## 2019-09-30 RX ADMIN — Medication 4 MILLIGRAM(S): at 06:07

## 2019-09-30 RX ADMIN — Medication 5 MILLIGRAM(S): at 06:16

## 2019-09-30 RX ADMIN — Medication 2: at 13:09

## 2019-09-30 RX ADMIN — LEVETIRACETAM 500 MILLIGRAM(S): 250 TABLET, FILM COATED ORAL at 06:07

## 2019-09-30 RX ADMIN — Medication 0.25 MILLIGRAM(S): at 18:41

## 2019-09-30 RX ADMIN — Medication 40 MILLIEQUIVALENT(S): at 00:57

## 2019-09-30 RX ADMIN — Medication 500000 UNIT(S): at 06:07

## 2019-09-30 RX ADMIN — Medication 0.25 MILLIGRAM(S): at 00:57

## 2019-09-30 RX ADMIN — Medication 650 MILLIGRAM(S): at 15:16

## 2019-09-30 RX ADMIN — PANTOPRAZOLE SODIUM 40 MILLIGRAM(S): 20 TABLET, DELAYED RELEASE ORAL at 06:16

## 2019-09-30 RX ADMIN — Medication 0.25 MILLIGRAM(S): at 06:16

## 2019-09-30 NOTE — GOALS OF CARE CONVERSATION - ADVANCED CARE PLANNING - CONVERSATION DETAILS
HCN: Consents emailed to dtr. She will return them tomorrow. DME delivery pending return of consents. Will revisit 9/23/19.Manuel GILES
[facility] - Equipment has been delivered from [facility] - hosp bed with VERO, O2 and a commode. Hospice is ready to render care

## 2019-09-30 NOTE — DISCHARGE NOTE NURSING/CASE MANAGEMENT/SOCIAL WORK - PATIENT PORTAL LINK FT
You can access the FollowMyHealth Patient Portal offered by [facility] by registering at the following website: [email or web address]. By joining [facility]’s FollowMyHealth portal, you will also be able to view your health information using other applications (apps) compatible with our system.

## 2019-09-30 NOTE — PROGRESS NOTE ADULT - PROVIDER SPECIALTY LIST ADULT
Heme/Onc
Heme/Onc
Internal Medicine
Palliative Care
Internal Medicine

## 2019-09-30 NOTE — PROGRESS NOTE ADULT - PROBLEM SELECTOR PROBLEM 1
Brain metastases
Nausea
Brain metastases
Nausea
Nausea

## 2019-09-30 NOTE — PROGRESS NOTE ADULT - PROBLEM SELECTOR PLAN 1
2/2 NSCLC  -[facility] undemonstrated multiple intracranial metastases. No hydrocephalus.  - Neurosurgery: not candidate for surgery  - oncology, rad onc and palliative care recs appreciated  - c/w decadron 4 mg q6 hr.  - c/w Keppra 500 mg bid for seizure prophylaxis  - c/w zofran and meclizine for n/v and vertigo management  - dilaudid 0.5mg q4h for HA  - fall precautions  -radiation therapy. Last tx Mon Sept 30 2/2 NSCLC  -[facility] undemonstrated multiple intracranial metastases. No hydrocephalus.  - Neurosurgery: not candidate for surgery  - oncology, rad onc and palliative care recs appreciated  - c/w decadron 4 mg q6 hr.  - c/w Keppra 500 mg bid for seizure prophylaxis  - c/w zofran and meclizine for n/v and vertigo management  - dilaudid 0.5mg q4h for HA  - fall precautions  -radiation therapy. Last tx Mon Sept 30 then home w/hospice

## 2019-09-30 NOTE — DISCHARGE NOTE NURSING/CASE MANAGEMENT/SOCIAL WORK - NSDCPNINST_GEN_ALL_CORE
Patient A&Ox2-3, vs afebrile, patient stable for discharge. Patient discharged to home this afternoon to home hospice, iv lock was taking out, discharge instruction given, no distress noted at time of discharged.  no distress

## 2019-09-30 NOTE — PROGRESS NOTE ADULT - SUBJECTIVE AND OBJECTIVE BOX
Marilee Rivas   PGY-1 Resident Physician   Pager [phone] from 7am to 7pm, after 7pm page night float     Patient is a 68y old  Male who presents with a chief complaint of blurry vision, HA, vertigo (29 Sep 2019 06:50)      SUBJECTIVE / OVERNIGHT EVENTS:    MEDICATIONS  (STANDING):  dexamethasone     Tablet 4 milliGRAM(s) Oral every 6 hours  dextrose 5%. 1000 milliLiter(s) (50 mL/Hr) IV Continuous <Continuous>  dextrose 50% Injectable 12.5 Gram(s) IV Push once  dextrose 50% Injectable 25 Gram(s) IV Push once  dextrose 50% Injectable 25 Gram(s) IV Push once  dronabinol 5 milliGRAM(s) Oral two times a day  enoxaparin Injectable 40 milliGRAM(s) SubCutaneous daily  insulin lispro (HumaLOG) corrective regimen sliding scale   SubCutaneous three times a day before meals  lactated ringers. 1000 milliLiter(s) (100 mL/Hr) IV Continuous <Continuous>  levETIRAcetam 500 milliGRAM(s) Oral two times a day  LORazepam     Tablet 0.25 milliGRAM(s) Oral every 6 hours  nystatin    Suspension 750358 Unit(s) Oral four times a day  pantoprazole    Tablet 40 milliGRAM(s) Oral before breakfast  QUEtiapine 25 milliGRAM(s) Oral at bedtime    MEDICATIONS  (PRN):  acetaminophen   Tablet .. 650 milliGRAM(s) Oral every 6 hours PRN Mild Pain (1 - 3)  dextrose 40% Gel 15 Gram(s) Oral once PRN Blood Glucose LESS THAN 70 milliGRAM(s)/deciliter  glucagon  Injectable 1 milliGRAM(s) IntraMuscular once PRN Glucose LESS THAN 70 milligrams/deciliter  LORazepam   Injectable 0.25 milliGRAM(s) IV Push every 4 hours PRN Nausea and/or Vomiting  meclizine 25 milliGRAM(s) Oral two times a day PRN Dizziness  melatonin 3 milliGRAM(s) Oral at bedtime PRN Insomnia  ondansetron    Tablet 4 milliGRAM(s) Oral every 6 hours PRN Nausea and/or Vomiting  polyethylene glycol 3350 17 Gram(s) Oral two times a day PRN Constipation    Allergies    No Known Allergies    Intolerances        Vital Signs Last 24 Hrs  T(C): 36.8 (30 Sep 2019 06:21), Max: 36.9 (29 Sep 2019 21:54)  T(F): 98.2 (30 Sep 2019 06:21), Max: 98.5 (29 Sep 2019 21:54)  HR: 90 (30 Sep 2019 06:21) (77 - 90)  BP: 101/59 (30 Sep 2019 06:21) (94/54 - 101/59)  BP(mean): --  RR: 17 (30 Sep 2019 06:21) (17 - 20)  SpO2: 99% (30 Sep 2019 06:21) (97% - 99%)  Daily     Daily   CAPILLARY BLOOD GLUCOSE      POCT Blood Glucose.: 111 mg/dL (29 Sep 2019 22:27)  POCT Blood Glucose.: 239 mg/dL (29 Sep 2019 18:09)  POCT Blood Glucose.: 113 mg/dL (29 Sep 2019 12:42)  POCT Blood Glucose.: 171 mg/dL (29 Sep 2019 08:42)    I&O's Summary    29 Sep 2019 07:01  -  30 Sep 2019 07:00  --------------------------------------------------------  IN: 1240 mL / OUT: 500 mL / NET: 740 mL        PHYSICAL EXAM:  GENERAL: NAD, well-developed  HEAD:  Atraumatic, Normocephalic  EYES: EOMI, PERRLA, conjunctiva and sclera clear  NECK: Supple, No JVD  CHEST/LUNG: Clear to auscultation bilaterally; No wheeze  HEART: Regular rate and rhythm; Normal S1 S2, No murmurs, rubs, or gallops  ABDOMEN: Soft, Nontender, Nondistended; Bowel sounds present  EXTREMITIES:  2+ Peripheral Pulses, No clubbing, cyanosis, or edema  PSYCH: AAOx3  NEUROLOGY: non-focal  SKIN: No rashes or lesions    LABS:                        11.6   8.86  )-----------( 134      ( 29 Sep 2019 07:00 )             34.3     Hgb Trend: 11.6<--, 12.4<--, 11.4<--  09-29    137  |  96<L>  |  14  ----------------------------<  154<H>  3.0<L>   |  24  |  0.48<L>    Ca    8.5      29 Sep 2019 07:00  Phos  2.9     09-29  Mg     2.0     09-29      Creatinine Trend: 0.48<--, 0.48<--, 0.52<--, 0.51<--, 0.62<--, 0.59<--              RADIOLOGY & ADDITIONAL TESTS:    Imaging Personally Reviewed:    Consultant(s) Notes Reviewed:      Care Discussed with Consultants/Other Providers: Marilee Rivas   PGY-1 Resident Physician   Pager [phone] from 7am to 7pm, after 7pm page night float     Patient is a 68y old  Male who presents with a chief complaint of blurry vision, HA, vertigo (29 Sep 2019 06:50)      SUBJECTIVE / OVERNIGHT EVENTS: No acute event overnight. Patient comfortable this morning, denies chest pain/shortness of breath/abdominal pain.     MEDICATIONS  (STANDING):  dexamethasone     Tablet 4 milliGRAM(s) Oral every 6 hours  dextrose 5%. 1000 milliLiter(s) (50 mL/Hr) IV Continuous <Continuous>  dextrose 50% Injectable 12.5 Gram(s) IV Push once  dextrose 50% Injectable 25 Gram(s) IV Push once  dextrose 50% Injectable 25 Gram(s) IV Push once  dronabinol 5 milliGRAM(s) Oral two times a day  enoxaparin Injectable 40 milliGRAM(s) SubCutaneous daily  insulin lispro (HumaLOG) corrective regimen sliding scale   SubCutaneous three times a day before meals  lactated ringers. 1000 milliLiter(s) (100 mL/Hr) IV Continuous <Continuous>  levETIRAcetam 500 milliGRAM(s) Oral two times a day  LORazepam     Tablet 0.25 milliGRAM(s) Oral every 6 hours  nystatin    Suspension 207224 Unit(s) Oral four times a day  pantoprazole    Tablet 40 milliGRAM(s) Oral before breakfast  QUEtiapine 25 milliGRAM(s) Oral at bedtime    MEDICATIONS  (PRN):  acetaminophen   Tablet .. 650 milliGRAM(s) Oral every 6 hours PRN Mild Pain (1 - 3)  dextrose 40% Gel 15 Gram(s) Oral once PRN Blood Glucose LESS THAN 70 milliGRAM(s)/deciliter  glucagon  Injectable 1 milliGRAM(s) IntraMuscular once PRN Glucose LESS THAN 70 milligrams/deciliter  LORazepam   Injectable 0.25 milliGRAM(s) IV Push every 4 hours PRN Nausea and/or Vomiting  meclizine 25 milliGRAM(s) Oral two times a day PRN Dizziness  melatonin 3 milliGRAM(s) Oral at bedtime PRN Insomnia  ondansetron    Tablet 4 milliGRAM(s) Oral every 6 hours PRN Nausea and/or Vomiting  polyethylene glycol 3350 17 Gram(s) Oral two times a day PRN Constipation    Allergies  No Known Allergies        Vital Signs Last 24 Hrs  T(C): 36.8 (30 Sep 2019 06:21), Max: 36.9 (29 Sep 2019 21:54)  T(F): 98.2 (30 Sep 2019 06:21), Max: 98.5 (29 Sep 2019 21:54)  HR: 90 (30 Sep 2019 06:21) (77 - 90)  BP: 101/59 (30 Sep 2019 06:21) (94/54 - 101/59)  BP(mean): --  RR: 17 (30 Sep 2019 06:21) (17 - 20)  SpO2: 99% (30 Sep 2019 06:21) (97% - 99%)  Daily     Daily   CAPILLARY BLOOD GLUCOSE      POCT Blood Glucose.: 111 mg/dL (29 Sep 2019 22:27)  POCT Blood Glucose.: 239 mg/dL (29 Sep 2019 18:09)  POCT Blood Glucose.: 113 mg/dL (29 Sep 2019 12:42)  POCT Blood Glucose.: 171 mg/dL (29 Sep 2019 08:42)    I&O's Summary    29 Sep 2019 07:01  -  30 Sep 2019 07:00  --------------------------------------------------------  IN: 1240 mL / OUT: 500 mL / NET: 740 mL    PHYSICAL EXAM  GENERAL: NAD, cachectic  HEAD:  Atraumatic, Normocephalic  EYES: Conjunctiva and sclera clear  NECK: Supple, No JVD  CHEST/LUNG: Clear to auscultation bilaterally; No wheeze  HEART: Regular rate and rhythm; No murmurs, rubs, or gallops  ABDOMEN: Soft, Nontender, Nondistended; Bowel sounds present  EXTREMITIES:  2+ Peripheral Pulses, No clubbing, cyanosis, or edema      LABS:                        11.6   8.86  )-----------( 134      ( 29 Sep 2019 07:00 )             34.3     Hgb Trend: 11.6<--, 12.4<--, 11.4<--  09-29    137  |  96<L>  |  14  ----------------------------<  154<H>  3.0<L>   |  24  |  0.48<L>    Ca    8.5      29 Sep 2019 07:00  Phos  2.9     09-29  Mg     2.0     09-29      Creatinine Trend: 0.48<--, 0.48<--, 0.52<--, 0.51<--, 0.62<--, 0.59<-- Marileeluann Rivas   PGY-1 Resident Physician   Pager [phone] from 7am to 7pm, after 7pm page night float     Patient is a 68y old  Male who presents with a chief complaint of blurry vision, HA, vertigo (29 Sep 2019 06:50)    SUBJECTIVE / OVERNIGHT EVENTS: No acute event overnight. Patient comfortable this morning, denies chest pain/shortness of breath/abdominal pain. No f/c/n/v/d.    MEDICATIONS  (STANDING):  dexamethasone     Tablet 4 milliGRAM(s) Oral every 6 hours  dextrose 5%. 1000 milliLiter(s) (50 mL/Hr) IV Continuous <Continuous>  dextrose 50% Injectable 12.5 Gram(s) IV Push once  dextrose 50% Injectable 25 Gram(s) IV Push once  dextrose 50% Injectable 25 Gram(s) IV Push once  dronabinol 5 milliGRAM(s) Oral two times a day  enoxaparin Injectable 40 milliGRAM(s) SubCutaneous daily  insulin lispro (HumaLOG) corrective regimen sliding scale   SubCutaneous three times a day before meals  lactated ringers. 1000 milliLiter(s) (100 mL/Hr) IV Continuous <Continuous>  levETIRAcetam 500 milliGRAM(s) Oral two times a day  LORazepam     Tablet 0.25 milliGRAM(s) Oral every 6 hours  nystatin    Suspension 733789 Unit(s) Oral four times a day  pantoprazole    Tablet 40 milliGRAM(s) Oral before breakfast  QUEtiapine 25 milliGRAM(s) Oral at bedtime    MEDICATIONS  (PRN):  acetaminophen   Tablet .. 650 milliGRAM(s) Oral every 6 hours PRN Mild Pain (1 - 3)  dextrose 40% Gel 15 Gram(s) Oral once PRN Blood Glucose LESS THAN 70 milliGRAM(s)/deciliter  glucagon  Injectable 1 milliGRAM(s) IntraMuscular once PRN Glucose LESS THAN 70 milligrams/deciliter  LORazepam   Injectable 0.25 milliGRAM(s) IV Push every 4 hours PRN Nausea and/or Vomiting  meclizine 25 milliGRAM(s) Oral two times a day PRN Dizziness  melatonin 3 milliGRAM(s) Oral at bedtime PRN Insomnia  ondansetron    Tablet 4 milliGRAM(s) Oral every 6 hours PRN Nausea and/or Vomiting  polyethylene glycol 3350 17 Gram(s) Oral two times a day PRN Constipation    Allergies  No Known Allergies        Vital Signs Last 24 Hrs  T(C): 36.8 (30 Sep 2019 06:21), Max: 36.9 (29 Sep 2019 21:54)  T(F): 98.2 (30 Sep 2019 06:21), Max: 98.5 (29 Sep 2019 21:54)  HR: 90 (30 Sep 2019 06:21) (77 - 90)  BP: 101/59 (30 Sep 2019 06:21) (94/54 - 101/59)  BP(mean): --  RR: 17 (30 Sep 2019 06:21) (17 - 20)  SpO2: 99% (30 Sep 2019 06:21) (97% - 99%)  Daily     Daily   CAPILLARY BLOOD GLUCOSE      POCT Blood Glucose.: 111 mg/dL (29 Sep 2019 22:27)  POCT Blood Glucose.: 239 mg/dL (29 Sep 2019 18:09)  POCT Blood Glucose.: 113 mg/dL (29 Sep 2019 12:42)  POCT Blood Glucose.: 171 mg/dL (29 Sep 2019 08:42)    I&O's Summary    29 Sep 2019 07:01  -  30 Sep 2019 07:00  --------------------------------------------------------  IN: 1240 mL / OUT: 500 mL / NET: 740 mL    PHYSICAL EXAM  GENERAL: NAD, cachectic  HEAD:  Atraumatic, Normocephalic  EYES: Conjunctiva and sclera clear  NECK: Supple, No JVD  CHEST/LUNG: Clear to auscultation bilaterally; No wheeze  HEART: Regular rate and rhythm; No murmurs, rubs, or gallops  ABDOMEN: Soft, Nontender, Nondistended; Bowel sounds present  EXTREMITIES:  2+ Peripheral Pulses, No clubbing, cyanosis, or edema      LABS:                        11.6   8.86  )-----------( 134      ( 29 Sep 2019 07:00 )             34.3     Hgb Trend: 11.6<--, 12.4<--, 11.4<--  09-29    137  |  96<L>  |  14  ----------------------------<  154<H>  3.0<L>   |  24  |  0.48<L>    Ca    8.5      29 Sep 2019 07:00  Phos  2.9     09-29  Mg     2.0     09-29      Creatinine Trend: 0.48<--, 0.48<--, 0.52<--, 0.51<--, 0.62<--, 0.59<--

## 2019-09-30 NOTE — CHART NOTE - NSCHARTNOTEFT_GEN_A_CORE
NUTRITION SERVICES                                                                                  MALNUTRITION ALERT     Attention Health Care Provider: Upon nutritional assessment by the Registered Dietitian your patient was determined to meet criteria / has evidence of the following diagnosis/diagnoses:    [ ] Mild Protein Calorie Malnutrition   [ ] Moderate Protein Calorie Malnutrition   [x ] Severe Protein Calorie Malnutrition   [ ] Unspecified Protein Calorie Malnutrition   [ ] Underweight / BMI <19  [ ] Morbid Obesity / BMI >40          By signing this assessment you are acknowledging the diagnosis/diagnoses.       PLAN OF CARE: Refer to Initial Dietitian Evaluation or Nutrition Follow-Up Documentation for Nutritional Recommendations.
Patient had a CT simulation and we will plan for radiation treatment starting 9/16/19 (30 Gy in 10 fractions) for a total of 10 treatments    HPI: 68 year old former smoker who was in his usual state of health until July 2019 when he began to experience dizziness. In late July 2019, his symptoms worsened. In mid-August 2019, the dizziness was associated with vomiting. He was taken to the hospital in [facility] where he was found to have multiple brain lesions consistent with metastatic disease. He made his way to [place] and is now status post admission at [facility] from 8/28/19-9/6/19. 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 Upon symptomatic improvement with steroids, he was discharged home with outpatient medical oncology and radiation oncology appointments.    Pathology: -Lung, RUL BAL 8/29/19: Positive for malignant cells. Non-small cell carcinoma, favor adenocarcinoma.  -Lung, RUL EMNB-guided biopsy and FNA 8/29/19: Positive for malignant cells. Non-small cell carcinoma favor adenocarcinoma. PD L1 negative at 0%. Molecular testing is pending.     8/28/19   MRI brain: INTRA-AXIAL: Multiple enhancing masses are seen throughout the cerebral and   cerebellar hemispheres as seen on the prior CT scan. Several lesions   demonstrate susceptibility artifact with or without precontrast T1   hyperintensity and heterogeneous T2 signal compatible with associated   hemorrhage. Several lesions are associated with vasogenic edema.   A left occipital mass with significant edema measures 1.4 x 1.3 cm.   A mass within the vermis measures 2.3 x 1.4 cm.   A mass within the right cerebellar hemisphere measures 1.8 x 1.6 cm.   A mass within the left cerebellum involving the cerebellar tonsil measures   1.8 x 1.8 cm and an adjacent mass within or projecting into the fourth   ventricle measures 1.5 x 1 cm effacing the fourth ventricular lumen. This   nodule significantly indents the adjacent medulla.
Source: Patient [x]  Family [x] Patient able to speak English and daughter provided translation when needed. Medical record reviewed. Patient seen for nutrition consult and malnutrition follow-up. S/p swallow assessment 9/18 with recommendation for regular texture diet and thin liquid consistency. Patient's diet advanced from clears to regular. Patient denies nausea/vomiting at this time. Continues to have low appetite and PO intake. Daughter reports patient consumed a small portion of Occitan toast and drank orange juice this morning. Patient amenable to drinking Ensure Clear oral nutrition supplements to optimize PO intake. Encouraged PO intake as tolerated and consuming small, frequent meals. Per chart review s/p [facility] 9/16 - patient "DNR/DNI/Limited medical interventions/No feeding tube."    Current Diet : Diet, Regular:   Low Sodium  Halal (09-18-19 @ 18:40)  PO intake:  < 50% [x] 50-75% [ ]   % [ ]  other :  Current Weight: no new weight, 53.9 (9/13)    __________________ Pertinent Medications__________________   MEDICATIONS  (STANDING):  dexamethasone     Tablet 4 milliGRAM(s) Oral every 6 hours  enoxaparin Injectable 40 milliGRAM(s) SubCutaneous daily  levETIRAcetam 500 milliGRAM(s) Oral two times a day  LORazepam   Injectable 0.25 milliGRAM(s) IV Push every 6 hours  nystatin    Suspension 115347 Unit(s) Oral four times a day  pantoprazole    Tablet 40 milliGRAM(s) Oral before breakfast  QUEtiapine 25 milliGRAM(s) Oral at bedtime    MEDICATIONS  (PRN):  acetaminophen   Tablet .. 650 milliGRAM(s) Oral every 6 hours PRN Mild Pain (1 - 3)  HYDROmorphone  Injectable 0.5 milliGRAM(s) IV Push every 4 hours PRN Severe Pain (7 - 10)  LORazepam   Injectable 0.25 milliGRAM(s) IV Push every 4 hours PRN Nausea and/or Vomiting  meclizine 25 milliGRAM(s) Oral two times a day PRN Dizziness  melatonin 3 milliGRAM(s) Oral at bedtime PRN Insomnia  ondansetron    Tablet 4 milliGRAM(s) Oral every 6 hours PRN Nausea and/or Vomiting  polyethylene glycol 3350 17 Gram(s) Oral two times a day PRN Constipation    __________________ Pertinent Labs__________________   09-18 Na129 mmol/L<L> Glu 190 mg/dL<H> K+ 3.7 mmol/L Cr  0.62 mg/dL BUN 24 mg/dL<H> 09-18 Phos 3.3 mg/dL 09-18 Alb 3.6 g/dL    Skin: No pressure ulcers/DTI noted in flowsheets.  Edema: none noted    Estimated Needs:   [x] no change since previous assessment  [ ] recalculated:     Previous Nutrition Diagnosis:   [x] Malnutrition, severe  Nutrition Diagnosis is [ ] ongoing  [ ] resolved [ ] not applicable     Interventions:   1. Continue Regular, Halal diet as tolerated.   2. Recommend Ensure Clear 240mls 3x daily (720Kcal, 24g protein).   3. Please Encourage po intake, assist with meals and menu selections, provide alternatives PRN.   4. Continue anti-nausea medication PRN.     Monitoring and Evaluation:  1. Patient to meet > 75% estimated pro/kcal needs.  2. Tolerance to diet.   RD to remain available, Apple Carlos RD, CDN Pager #[phone]
Source: Patient [x] Medical record reviewed. Patient seen for malnutrition/length of stay nutrition follow-up. Patient denies nausea/vomiting at this time. Continues to have low appetite and PO intake. Reports he is mainly consuming 2 Ensure Clear oral nutrition supplements, portion of one meal and pudding daily. Per chart review per pallaitve care note 9/23 "Pt is DNR/DNI. MOLST in the chart DNR / DNI / Limited medical interventions / No feeding tube."     Current Diet : Diet, Regular:   Low Sodium  Halal  Supplement Feeding Modality:  Oral  Ensure Clear Cans or Servings Per Day:  1       Frequency:  Three Times a day (09-19-19 @ 11:40)  PO intake:  < 50% [x] 50-75% [ ]   % [ ]  other :  Current Weight: 57.1kg (9/24, bed-scale weight taken at time of visit), 53.9kg (9/13) - Weight change likely due to bed-scale discrepancies    __________________ Pertinent Medications__________________   MEDICATIONS  (STANDING):  dexamethasone     Tablet 4 milliGRAM(s) Oral every 6 hours  dextrose 5%. 1000 milliLiter(s) (50 mL/Hr) IV Continuous <Continuous>  dextrose 50% Injectable 12.5 Gram(s) IV Push once  dextrose 50% Injectable 25 Gram(s) IV Push once  dextrose 50% Injectable 25 Gram(s) IV Push once  dronabinol 5 milliGRAM(s) Oral two times a day  enoxaparin Injectable 40 milliGRAM(s) SubCutaneous daily  insulin lispro (HumaLOG) corrective regimen sliding scale   SubCutaneous three times a day before meals  levETIRAcetam 500 milliGRAM(s) Oral two times a day  LORazepam   Injectable 0.25 milliGRAM(s) IV Push every 6 hours  nystatin    Suspension 127035 Unit(s) Oral four times a day  pantoprazole    Tablet 40 milliGRAM(s) Oral before breakfast  QUEtiapine 25 milliGRAM(s) Oral at bedtime  sodium chloride 0.9%. 500 milliLiter(s) (100 mL/Hr) IV Continuous <Continuous>    MEDICATIONS  (PRN):  acetaminophen   Tablet .. 650 milliGRAM(s) Oral every 6 hours PRN Mild Pain (1 - 3)  dextrose 40% Gel 15 Gram(s) Oral once PRN Blood Glucose LESS THAN 70 milliGRAM(s)/deciliter  glucagon  Injectable 1 milliGRAM(s) IntraMuscular once PRN Glucose LESS THAN 70 milligrams/deciliter  LORazepam   Injectable 0.25 milliGRAM(s) IV Push every 4 hours PRN Nausea and/or Vomiting  meclizine 25 milliGRAM(s) Oral two times a day PRN Dizziness  melatonin 3 milliGRAM(s) Oral at bedtime PRN Insomnia  ondansetron    Tablet 4 milliGRAM(s) Oral every 6 hours PRN Nausea and/or Vomiting  polyethylene glycol 3350 17 Gram(s) Oral two times a day PRN Constipation    __________________ Pertinent Labs__________________   09-24 Na136 mmol/L Glu 135 mg/dL<H> K+ 3.8 mmol/L Cr  0.52 mg/dL BUN 13 mg/dL 09-24 Phos 2.6 mg/dL 09-23 Alb 2.9 g/dL<L> 09-24 [id number] 6.7 %<H>  CAPILLARY BLOOD GLUCOSE  POCT Blood Glucose.: 157 mg/dL (24 Sep 2019 12:56)  POCT Blood Glucose.: 120 mg/dL (24 Sep 2019 08:59)  POCT Blood Glucose.: 163 mg/dL (23 Sep 2019 22:22)  POCT Blood Glucose.: 203 mg/dL (23 Sep 2019 17:54)    Skin: No pressure ulcers/DTI noted in flowsheets.   Edema: none noted    Estimated Needs:   [x] no change since previous assessment  [ ] recalculated:     Previous Nutrition Diagnosis:   [x] Malnutrition, severe  Nutrition Diagnosis is [x] ongoing  [ ] resolved [ ] not applicable     Interventions: *To align with GOC, patient/family wishes"  1. Continue Regular, Halal diet as tolerated.   2. Continue Ensure Clear 240mls 3x daily (720Kcal, 24g protein).   3. Please Encourage po intake, assist with meals and menu selections, provide alternatives PRN.   4. Continue anti-nausea medication PRN.     Monitoring and Evaluation:  1. Patient to meet > 75% estimated pro/kcal needs.  2. Tolerance to diet.   RD to remain available, Apple Carlos RD, CDN Pager #[phone]
Source: Patient [x]  Family [x] Daughter at bedside. Medical record reviewed. Patient seen for malnutrition/length of stay nutrition follow-up. Daughter reports patient continues to have no appetite. Patient mainly consumes small amount of meal in the morning and then does not eat much for the rest of the day. Family is brining in food for patient and encourage patient to eat. No nausea/vomiting reported today.    Current Diet : Diet, Regular:   Low Sodium  Halal  Supplement Feeding Modality:  Oral  Ensure Clear Cans or Servings Per Day:  3       Frequency:  Daily (09-24-19 @ 15:53)  PO intake:  < 50% [x] 50-75% [ ]   % [ ]  other :  Current Weight: 54.2kg (9/25),  57.1kg (9/24, bed-scale weight taken at time of visit), 53.9kg (9/13) - Weight changes likely due to bed-scale discrepancies    __________________ Pertinent Medications__________________   MEDICATIONS  (STANDING):  dexamethasone     Tablet 4 milliGRAM(s) Oral every 6 hours  dextrose 5%. 1000 milliLiter(s) (50 mL/Hr) IV Continuous <Continuous>  dextrose 50% Injectable 12.5 Gram(s) IV Push once  dextrose 50% Injectable 25 Gram(s) IV Push once  dextrose 50% Injectable 25 Gram(s) IV Push once  dronabinol 5 milliGRAM(s) Oral two times a day  enoxaparin Injectable 40 milliGRAM(s) SubCutaneous daily  insulin lispro (HumaLOG) corrective regimen sliding scale   SubCutaneous three times a day before meals  lactated ringers. 1000 milliLiter(s) (100 mL/Hr) IV Continuous <Continuous>  levETIRAcetam 500 milliGRAM(s) Oral two times a day  LORazepam     Tablet 0.25 milliGRAM(s) Oral every 6 hours  nystatin    Suspension 189823 Unit(s) Oral four times a day  pantoprazole    Tablet 40 milliGRAM(s) Oral before breakfast  QUEtiapine 25 milliGRAM(s) Oral at bedtime    MEDICATIONS  (PRN):  acetaminophen   Tablet .. 650 milliGRAM(s) Oral every 6 hours PRN Mild Pain (1 - 3)  dextrose 40% Gel 15 Gram(s) Oral once PRN Blood Glucose LESS THAN 70 milliGRAM(s)/deciliter  glucagon  Injectable 1 milliGRAM(s) IntraMuscular once PRN Glucose LESS THAN 70 milligrams/deciliter  LORazepam   Injectable 0.25 milliGRAM(s) IV Push every 4 hours PRN Nausea and/or Vomiting  meclizine 25 milliGRAM(s) Oral two times a day PRN Dizziness  melatonin 3 milliGRAM(s) Oral at bedtime PRN Insomnia  ondansetron    Tablet 4 milliGRAM(s) Oral every 6 hours PRN Nausea and/or Vomiting  polyethylene glycol 3350 17 Gram(s) Oral two times a day PRN Constipation    __________________ Pertinent Labs__________________   09-30 Na138 mmol/L Glu 135 mg/dL<H> K+ 3.7 mmol/L Cr  0.51 mg/dL BUN 14 mg/dL 09-29 Phos 2.9 mg/dL 09-24 [id number] 6.7 %<H>    Skin: No pressure ulcers/DTI noted in flowsheets.   Edema: none noted.    Estimated Needs:   [x] no change since previous assessment  [ ] recalculated:     Previous Nutrition Diagnosis:   [x] Malnutrition, severe  Nutrition Diagnosis is [x] ongoing  [ ] resolved [ ] not applicable     Interventions: *To align with GOC, patient/family wishes"  1. Continue Regular, Halal diet as tolerated.   2. Continue Ensure Clear 240mls 3x daily (720Kcal, 24g protein).   3. Please Encourage po intake, assist with meals and menu selections, provide alternatives PRN.   4. Continue anti-nausea medication PRN.     Monitoring and Evaluation:  1. Patient to meet > 75% estimated pro/kcal needs.  2. Tolerance to diet.   RD to remain available, Apple Carlos RD, CDN Pager #[phone].

## 2019-09-30 NOTE — CHART NOTE - NSCHARTNOTESELECT_GEN_ALL_CORE
Follow-Up/Nutrition Services
Nutrition Services
Radiation Oncology/Event Note

## 2019-09-30 NOTE — PROGRESS NOTE ADULT - REASON FOR ADMISSION
blurry vision, HA, vertigo

## 2019-09-30 NOTE — PROGRESS NOTE ADULT - ATTENDING COMMENTS
I discussed the case and saw the patient with the fellow and agree with the above     Tamica Mireles MD
Pt seen examined and d/w fellow. agree with above A/P. recently dxd NSCLCa with mets to CNS. PDL1 neg and awaiting further molecular markers. Pt c/o diplopia intermittently and generalized weakness / difficulty with ambulation. PE remarkable for left CN VI palsy and possible left CN IV palsy vs other. Gait not evaluated. A/P Agree with plan for WBRT. Further pall therapy would depend on results of molecular testing for potential molecular targets vs pall chemo. This will be coordinated after d/c at the [facility].  Pt aware bt also cosidering returneing home to [facility]
Patient stable for home with hospice following last RT session today.    I spent 35 minutes counseling this patient and coordinating their discharge.
Continue supportive measures, nutritional supplements, and appetite stimulant. RT to resume Monday.
Patient is for RT tomorrow. Family to decide on chemo vs. hospice pending functional status this admission.
Pt initiating RT today, planned for up to 10 treatments pending response. Continue supportive care. Appreciate Palliative Care recommendations.
Pt reports similar symptoms today: lightheadedness/dizziness, blurry vision off-and-on, overall weakness and poor appetite. He is amenable to a trial of dronabinol (discussed with his family who are in agreement). Continue RT for 10 sessions total.
Pt tolerating RT, but with persistent blurry vision, decreased appetite/energy/functional status. Suspect this is all related to disease progression and brain metastases. Continue supportive/nutritional interventions. Continue to discuss advanced care planning.
Pt tolerating RT. Continue symptomatic treatment and nutritional support (pt with severe protein-calorie malnutrition). Appreciate ongoing Palliative Care recs.
Agree with above with following addendum:    #Metastatic NSCLC with brain mets with headache and nausea  -brain lesions likely culprit.  -rad-onc and heme-onc appreciated, for radiation therapy on Monday.  -palliative called for symptom management, already on high dose steroids, will attempt ativan for nausea relief.  -continue decadron q6hrs.    #Dyspnea  -worsening dyspnea  -unofficial POCUS lung exam with a-line anteriorly, no pleural effusion, decreased lung sliding in apex on right, likely corresponding to RUL lesion.  Unofficial POCUS focused heart exam without evidence of pericardial effusion.  -continue O2 at this time, dyspnea likely in setting of lung CA    #GOC  -family leaning towards palliative care, but want to see how patient responds to RT. Palliative to follow.
Pt tolerating RT. Will plan for 10 total sessions in-house. Continue to work with Palliative Care to optimize symptom control.
Pt's symptoms are unchanged -- low energy, blurry vision, episodic headaches/lightheadedness. Continue RT inpatient, as well as nutritional support.
Pt seen with NP.  Agree with above plan.
Patient sympmtatically improved with steroids and ativan.  Contineu current management, for radiation therapy on Monday.    #Hyponatremia  -repeat Erick <20, pre-renal.  -gentle fluid

## 2019-10-02 ENCOUNTER — INBOUND DOCUMENT (OUTPATIENT)
Age: 69
End: 2019-10-02

## 2019-10-08 ENCOUNTER — INBOUND DOCUMENT (OUTPATIENT)
Age: 69
End: 2019-10-08

## 2019-10-10 PROBLEM — C34.90 MALIGNANT NEOPLASM OF UNSPECIFIED PART OF UNSPECIFIED BRONCHUS OR LUNG: Chronic | Status: ACTIVE | Noted: 2019-09-12

## 2019-10-10 PROBLEM — C79.31 SECONDARY MALIGNANT NEOPLASM OF BRAIN: Chronic | Status: ACTIVE | Noted: 2019-09-12

## 2019-10-10 PROBLEM — K21.9 GASTRO-ESOPHAGEAL REFLUX DISEASE WITHOUT ESOPHAGITIS: Chronic | Status: ACTIVE | Noted: 2019-09-13

## 2019-10-17 ENCOUNTER — APPOINTMENT (OUTPATIENT)
Dept: HEMATOLOGY ONCOLOGY | Facility: CLINIC | Age: 69
End: 2019-10-17

## 2020-08-28 NOTE — PROGRESS NOTE ADULT - PROBLEM SELECTOR PLAN 1
Calm multiple lesions seen on CTH noncon and redemonstrated on MR Brain most likely representing metastasis disease from suspected primary lung CA  - vision change likely 2/2 to occipital lesions  - ataxia likely 2/2 to cerebellar lesions  - neurosurgery c/s, recs reviewed and appreciated  - c/w levetiracetam 500 mg BID for seizure ppx  - oncology c/s, recs appreciated  - fall risk precautions  - PT c/s on pt with worsening ataxia 2/2 to brain metastases  - meclizine for dizziness  - rad/onc eval re: RT once primary malignancy confirmed  - worsening presenting sxs, will increase steroids: decadron 2 mg IV TID multiple lesions seen on CTH noncon and redemonstrated on MR Brain most likely representing metastasis disease from suspected primary lung CA  - vision change likely 2/2 to occipital lesions  - ataxia likely 2/2 to cerebellar lesions  - neurosurgery c/s, interventions limited due to the extensive number of brain lesions  - c/w levetiracetam 500 mg BID for seizure ppx  - oncology c/s, recs appreciated  - fall risk precautions  - PT c/s on pt with worsening ataxia 2/2 to brain metastases  - meclizine for dizziness  - rad/onc eval re: RT once primary malignancy confirmed  - worsening presenting sxs, will increase steroids: decadron 2 mg IV TID  - 0.5 Haldol IV given last night for worsening N/V/vertigo with good symptom control  - no interval change on repeat CTH multiple lesions seen on CTH noncon and redemonstrated on MR Brain most likely representing metastasis disease from suspected primary lung CA  - vision change likely 2/2 to occipital lesions  - ataxia likely 2/2 to cerebellar lesions  - neurosurgery c/s, interventions limited due to the extensive number of brain lesions  - c/w levetiracetam 500 mg BID for seizure ppx  - oncology c/s, recs appreciated  - fall risk precautions  - PT c/s on pt with worsening ataxia 2/2 to brain metastases  - meclizine for dizziness  - rad/onc eval re: RT once primary malignancy confirmed  - worsening presenting sxs, steroids were increased to decadron 2 mg IV TID  - 0.5 Haldol IV given last night for worsening N/V/vertigo with good symptom control  - no interval change on repeat CTH

## 2021-04-25 NOTE — PROGRESS NOTE ADULT - PROBLEM SELECTOR PROBLEM 4
[Eyelids] : normal eyelids Protein calorie malnutrition [Pupils] : pupils were equal and round [Mucosa] : moist and pink mucosa [Palate] : normal palate [Cardiac Auscultation] : normal cardiac auscultation  [Respiratory Effort] : normal respiratory effort [Digits] : normal digits [Gait] : normal gait [PERRLA] : SAMREEN [S1, S2 Present] : S1, S2 present [Clear to auscultation] : clear to auscultation [Soft] : soft [Non Distended] : non distended [NonTender] : non tender [Normal Bowel Sounds] : normal bowel sounds [No Hepatosplenomegaly] : no hepatosplenomegaly [No Abnormal Lymph Nodes Palpated] : no abnormal lymph nodes palpated [Muscle Strength] : normal muscle strength [Range Of Motion] : full range of motion [Intact Judgement] : intact judgement  [Insight Insight] : intact insight [0] : 0 [Acute distress] : no acute distress [Rash] : no rash [Erythematous Conjunctiva] : nonerythematous conjunctiva [Malar Erythema] : no malar erythema [Erythematous Oropharynx] : nonerythematous oropharynx [Lesions] : no lesions [Murmurs] : no murmurs [Joint effusions] : no joint effusions [Peripheral Edema] : no peripheral edema  [FreeTextEntry1] : obese [de-identified] : acanthosis nigricans [de-identified] : b/l pes planus, no arthritis, negative LORENZO bilaterally [NumbJointsActiveArthritis] : 0 [de-identified] : FROM C-Spine [de-identified] : no gross discrepancy

## 2022-08-10 NOTE — PROGRESS NOTE ADULT - PROBLEM SELECTOR PROBLEM 3
Protein calorie malnutrition Sarecycline Pregnancy And Lactation Text: This medication is Pregnancy Category D and not consider safe during pregnancy. It is also excreted in breast milk.

## 2022-10-10 NOTE — ED ADULT NURSE NOTE - NSFALLRSKASSESSDT_ED_ALL_ED
28-Aug-2019 11:15 Klisyri Counseling:  I discussed with the patient the risks of Klisyri including but not limited to erythema, scaling, itching, weeping, crusting, and pain.

## 2022-11-17 NOTE — PROVIDER CONTACT NOTE (OTHER) - REASON
Patient is complaining of vertigo and "seeing things upside-down"
Patient vomiting
Pt refused meds and requested to reschedule Keppra
temp 96.3
Declines

## 2024-08-13 NOTE — PATIENT PROFILE ADULT - HAS THE PATIENT EXPERIENCED ANY OF THE FOLLOWING WITHIN THE WEEK PRIOR TO ADMISSION?
Patient with diffuse abdominal pain.  Seen last night but reports pain is worse.  Will recheck labs, give IV fluids and pain medicine, reassess
no

## 2025-02-03 NOTE — ED PROVIDER NOTE - ENMT NEGATIVE STATEMENT, MLM
[facility] paperwork completed, signed and faxed to? [phone] or [phone] to [facility] Claims Dept.   Claim # [id number]  Email : [email or web address]  Confirmation received.   Sent to scanning.   Will place in chart once uploaded.   Patient informed   
A  [employer] Form  form request was  E-mailed  on 1/27/2025  for Juan Manuel Garcia MD to complete and/or review.      Completed Forms to be faxed to [phone]  [facility]   Claims Department    Callback number to be used to notify of completion   Mobile [phone]       Patient/requesting party was informed upon receipt of form request, that forms will be processed in 7-10 business days. Form was given to Katherine Tavarez and encounter routed to nursing [place].  
[facility] EMERGENCY DEPT  [id number] [id number] [phone]    Work/School Note    Date: 11/16/2022    To Whom It May concern:    Aniya Worthy was seen and treated today in the emergency room by the following provider(s):  Attending Provider: Malvin Santoro MD  Physician Assistant: Irvin Quispe is excused from work/school on 11/16/2022 through 11/18/2022. He is medically clear to return to work/school on 11/19/2022 as long as he has been without a fever for 24 hours.          Sincerely,          Gregoria Chavez PA-C
Paperwork completed waiting upon MD signature   
Ears: no ear pain and no hearing problems.Nose: no nasal congestion and no nasal drainage.Mouth/Throat: no dysphagia, no hoarseness and no throat pain.Neck: no lumps, no pain, no stiffness and no swollen glands.

## 2025-04-14 NOTE — DISCHARGE NOTE PROVIDER - NSDCHHASSISTDEVIC_GEN_ALL_CORE_FT
[Maximal Pain Intensity: 0/10] : 0/10 [90: Able to carry normal activity; minor signs or symptoms of disease.] : 90: Able to carry normal activity; minor signs or symptoms of disease.  Poor musculoskeletal strength 2/2 inpatient status, needs home PT for gait stability and training and needs walker
